# Patient Record
Sex: FEMALE | Race: WHITE | NOT HISPANIC OR LATINO | Employment: UNEMPLOYED | URBAN - NONMETROPOLITAN AREA
[De-identification: names, ages, dates, MRNs, and addresses within clinical notes are randomized per-mention and may not be internally consistent; named-entity substitution may affect disease eponyms.]

---

## 2018-04-28 ENCOUNTER — HOSPITAL ENCOUNTER (EMERGENCY)
Facility: HOSPITAL | Age: 58
Discharge: HOME | End: 2018-04-28
Attending: EMERGENCY MEDICINE | Admitting: EMERGENCY MEDICINE

## 2018-04-28 VITALS
OXYGEN SATURATION: 96 % | HEIGHT: 65 IN | TEMPERATURE: 98.6 F | DIASTOLIC BLOOD PRESSURE: 72 MMHG | RESPIRATION RATE: 20 BRPM | WEIGHT: 160 LBS | SYSTOLIC BLOOD PRESSURE: 106 MMHG | BODY MASS INDEX: 26.66 KG/M2 | HEART RATE: 70 BPM

## 2018-04-28 DIAGNOSIS — J95.03 TRACHEOSTOMY MALFUNCTION (HCC): Primary | ICD-10-CM

## 2018-04-28 PROCEDURE — 99284 EMERGENCY DEPT VISIT MOD MDM: CPT

## 2018-04-28 RX ORDER — ERGOCALCIFEROL 1.25 MG/1
50000 CAPSULE ORAL WEEKLY
COMMUNITY

## 2018-04-28 RX ORDER — AMLODIPINE BESYLATE 10 MG/1
10 TABLET ORAL DAILY
COMMUNITY

## 2018-04-28 RX ORDER — FUROSEMIDE 20 MG/1
20 TABLET ORAL 2 TIMES DAILY
COMMUNITY

## 2018-04-28 RX ORDER — POTASSIUM CHLORIDE 600 MG/1
8 CAPSULE, EXTENDED RELEASE ORAL 2 TIMES DAILY
COMMUNITY

## 2018-04-28 RX ORDER — SENNOSIDES 8.6 MG
TABLET ORAL NIGHTLY
COMMUNITY

## 2018-04-28 RX ORDER — METOCLOPRAMIDE HYDROCHLORIDE 5 MG/5ML
SOLUTION ORAL
COMMUNITY

## 2018-04-28 RX ORDER — ASPIRIN 325 MG
325 TABLET ORAL DAILY
COMMUNITY

## 2018-04-28 RX ORDER — LEVETIRACETAM 100 MG/ML
10 SOLUTION ORAL 2 TIMES DAILY
COMMUNITY

## 2018-04-28 RX ORDER — LACTOBACILLUS ACIDOPHILUS / LACTOBACILLUS BULGARICUS 100 MILLION CFU STRENGTH
GRANULES ORAL 3 TIMES DAILY
COMMUNITY

## 2018-04-28 RX ORDER — METOPROLOL TARTRATE 50 MG/1
50 TABLET, FILM COATED ORAL 2 TIMES DAILY
COMMUNITY

## 2018-04-28 RX ORDER — BUDESONIDE 0.5 MG/2ML
0.5 INHALANT ORAL
COMMUNITY

## 2018-04-28 RX ORDER — GLYCOPYRROLATE 1 MG/1
1 TABLET ORAL 3 TIMES DAILY
COMMUNITY

## 2019-06-11 DIAGNOSIS — M25.572 LEFT ANKLE PAIN, UNSPECIFIED CHRONICITY: Primary | ICD-10-CM

## 2019-07-03 ENCOUNTER — HOSPITAL ENCOUNTER (OUTPATIENT)
Dept: GENERAL RADIOLOGY | Facility: HOSPITAL | Age: 59
Discharge: HOME | End: 2019-07-03
Admitting: ORTHOPAEDIC SURGERY

## 2019-07-03 ENCOUNTER — OFFICE VISIT (OUTPATIENT)
Dept: ORTHOPEDIC SURGERY | Facility: CLINIC | Age: 59
End: 2019-07-03

## 2019-07-03 VITALS
WEIGHT: 175 LBS | BODY MASS INDEX: 29.12 KG/M2 | HEART RATE: 57 BPM | SYSTOLIC BLOOD PRESSURE: 121 MMHG | DIASTOLIC BLOOD PRESSURE: 65 MMHG

## 2019-07-03 DIAGNOSIS — S82.892A CLOSED FRACTURE DISLOCATION OF LEFT ANKLE, INITIAL ENCOUNTER: Primary | ICD-10-CM

## 2019-07-03 DIAGNOSIS — M25.572 LEFT ANKLE PAIN, UNSPECIFIED CHRONICITY: ICD-10-CM

## 2019-07-03 PROCEDURE — 73600 X-RAY EXAM OF ANKLE: CPT

## 2019-07-03 PROCEDURE — 73600 X-RAY EXAM OF ANKLE: CPT | Performed by: RADIOLOGY

## 2019-07-03 PROCEDURE — 99203 OFFICE O/P NEW LOW 30 MIN: CPT | Performed by: ORTHOPAEDIC SURGERY

## 2019-07-03 RX ORDER — HYDROCODONE BITARTRATE AND ACETAMINOPHEN 5; 325 MG/1; MG/1
1 TABLET ORAL EVERY 6 HOURS PRN
COMMUNITY

## 2019-07-03 NOTE — PROGRESS NOTES
Follow-up Visit         Patient: Yulisa Valdez  YOB: 1960  Date of Encounter: 07/03/2019      Chief  Complaint:   Chief Complaint   Patient presents with   • Left Ankle - Pain           HPI:  Yulisa Valdez, 58 y.o. female referred by Dr. Moya.  She is a resident of [facility].  She is nonverbal and has no representative with her today.  She did present with an admission record from the [facility].  Apparently her situation began with cardiopulmonary arrest.  She had anoxic brain injury and now is in a vegetative state.  I reviewed her medical records and she was seen in the emergency room April 2018 regarding her tracheostomy.  Her records indicate that she has schizoaffective disorder and has been a phasic since May 1 of 2014.  We do not have information regarding her injury however she presents today with a x-ray report describes fracture of the right distal fibula.  she has been scheduled on 3 occasions but has had transportation issues.  Her first scheduled appointment here was June 7 of 2019.  She did not present.  Today she presents with a posterior splint to the left ankle.    Medical History:  There is no problem list on file for this patient.    Past Medical History:   Diagnosis Date   • COPD (chronic obstructive pulmonary disease) (CMS/[facility])    • Diabetes (CMS/[facility])    • Epilepsy (CMS/[facility])    • GERD (gastroesophageal reflux disease)    • Osteoporosis    • Schizoaffective disorder (CMS/[facility])    • Seizures (CMS/[facility])        Social History:  Social History     Socioeconomic History   • Marital status: Single     Spouse name: Not on file   • Number of children: Not on file   • Years of education: Not on file   • Highest education level: Not on file   Tobacco Use   • Smoking status: Former Smoker   • Smokeless tobacco: Never Used   Substance and Sexual Activity   • Alcohol use: No   • Drug use: No   • Sexual activity: Defer     Review of Systems:  Review of  Systems   Unable to perform ROS: Patient nonverbal (Unable to obtain review of system)         Surgical History:  History reviewed. No pertinent surgical history.    Radiology: Radiographs brought with her today AP lateral left ankle by report described distal fibula fracture displaced.  My review confirms a distal fibula fracture she also has a dislocation of the tibiotalar joint.      Examination: Left lower extremity evaluation she keeps her left knee in flexed position with contracture.  She has mild swelling of the ankle and foot.  She has generalized tenderness to palpation.  Neurovascular examination is grossly intact.      Assessment & Plan:   58 y.o. female with fracture dislocation of her left ankle sustained at least 4 weeks ago based on her previous appointment schedule.  Her left ankle remains dislocated.  She is placed in a posterior splint today and we will continue this for 4 weeks reevaluate her in 4 weeks time.  I do not think that surgical intervention at this point can be justified given her nonambulatory status and at least 4 weeks duration since she dislocated her ankle.         Diagnosis Plan   1. Closed fracture dislocation of left ankle, initial encounter               Cc:  Ester Moya MD            This document has been electronically signed by Tony Cordon MD   July 10, 2019 6:38 PM

## 2019-08-21 ENCOUNTER — APPOINTMENT (OUTPATIENT)
Dept: GENERAL RADIOLOGY | Facility: HOSPITAL | Age: 59
End: 2019-08-21

## 2021-01-01 ENCOUNTER — OUTSIDE FACILITY SERVICE (OUTPATIENT)
Dept: PULMONOLOGY | Facility: CLINIC | Age: 61
End: 2021-01-01

## 2021-01-01 ENCOUNTER — APPOINTMENT (OUTPATIENT)
Dept: CARDIOLOGY | Facility: HOSPITAL | Age: 61
End: 2021-01-01

## 2021-01-01 ENCOUNTER — APPOINTMENT (OUTPATIENT)
Dept: CT IMAGING | Facility: HOSPITAL | Age: 61
End: 2021-01-01

## 2021-01-01 ENCOUNTER — APPOINTMENT (OUTPATIENT)
Dept: GENERAL RADIOLOGY | Facility: HOSPITAL | Age: 61
End: 2021-01-01

## 2021-01-01 ENCOUNTER — HOSPITAL ENCOUNTER (OUTPATIENT)
Facility: HOSPITAL | Age: 61
Discharge: NURSING, REHAB OR LONG-TERM CARE | End: 2021-12-13
Attending: INTERNAL MEDICINE | Admitting: INTERNAL MEDICINE

## 2021-01-01 ENCOUNTER — HOSPITAL ENCOUNTER (INPATIENT)
Facility: HOSPITAL | Age: 61
LOS: 17 days | End: 2022-01-08
Attending: INTERNAL MEDICINE | Admitting: INTERNAL MEDICINE

## 2021-01-01 ENCOUNTER — HOSPITAL ENCOUNTER (INPATIENT)
Facility: HOSPITAL | Age: 61
LOS: 5 days | End: 2021-12-22
Attending: EMERGENCY MEDICINE | Admitting: HOSPITALIST

## 2021-01-01 VITALS
HEIGHT: 65 IN | RESPIRATION RATE: 16 BRPM | TEMPERATURE: 98.4 F | WEIGHT: 163 LBS | HEART RATE: 55 BPM | SYSTOLIC BLOOD PRESSURE: 140 MMHG | BODY MASS INDEX: 27.16 KG/M2 | OXYGEN SATURATION: 95 % | DIASTOLIC BLOOD PRESSURE: 79 MMHG

## 2021-01-01 VITALS
HEART RATE: 72 BPM | TEMPERATURE: 98.9 F | RESPIRATION RATE: 20 BRPM | DIASTOLIC BLOOD PRESSURE: 80 MMHG | SYSTOLIC BLOOD PRESSURE: 146 MMHG | OXYGEN SATURATION: 94 %

## 2021-01-01 DIAGNOSIS — A41.9 SEPSIS, DUE TO UNSPECIFIED ORGANISM, UNSPECIFIED WHETHER ACUTE ORGAN DYSFUNCTION PRESENT (HCC): Primary | ICD-10-CM

## 2021-01-01 LAB
A-A DO2: 245.8 MMHG (ref 0–300)
ALBUMIN SERPL-MCNC: 2.4 G/DL (ref 3.5–5.2)
ALBUMIN SERPL-MCNC: 2.57 G/DL (ref 3.5–5.2)
ALBUMIN SERPL-MCNC: 2.57 G/DL (ref 3.5–5.2)
ALBUMIN SERPL-MCNC: 2.59 G/DL (ref 3.5–5.2)
ALBUMIN SERPL-MCNC: 2.6 G/DL (ref 3.5–5.2)
ALBUMIN SERPL-MCNC: 2.6 G/DL (ref 3.5–5.2)
ALBUMIN SERPL-MCNC: 2.61 G/DL (ref 3.5–5.2)
ALBUMIN SERPL-MCNC: 2.63 G/DL (ref 3.5–5.2)
ALBUMIN SERPL-MCNC: 2.64 G/DL (ref 3.5–5.2)
ALBUMIN SERPL-MCNC: 2.65 G/DL (ref 3.5–5.2)
ALBUMIN SERPL-MCNC: 2.69 G/DL (ref 3.5–5.2)
ALBUMIN SERPL-MCNC: 2.69 G/DL (ref 3.5–5.2)
ALBUMIN SERPL-MCNC: 2.73 G/DL (ref 3.5–5.2)
ALBUMIN SERPL-MCNC: 2.74 G/DL (ref 3.5–5.2)
ALBUMIN SERPL-MCNC: 2.75 G/DL (ref 3.5–5.2)
ALBUMIN SERPL-MCNC: 2.75 G/DL (ref 3.5–5.2)
ALBUMIN SERPL-MCNC: 2.76 G/DL (ref 3.5–5.2)
ALBUMIN SERPL-MCNC: 2.78 G/DL (ref 3.5–5.2)
ALBUMIN SERPL-MCNC: 2.8 G/DL (ref 3.5–5.2)
ALBUMIN SERPL-MCNC: 2.81 G/DL (ref 3.5–5.2)
ALBUMIN SERPL-MCNC: 2.84 G/DL (ref 3.5–5.2)
ALBUMIN SERPL-MCNC: 2.84 G/DL (ref 3.5–5.2)
ALBUMIN SERPL-MCNC: 2.85 G/DL (ref 3.5–5.2)
ALBUMIN SERPL-MCNC: 2.85 G/DL (ref 3.5–5.2)
ALBUMIN SERPL-MCNC: 2.87 G/DL (ref 3.5–5.2)
ALBUMIN SERPL-MCNC: 2.87 G/DL (ref 3.5–5.2)
ALBUMIN SERPL-MCNC: 2.88 G/DL (ref 3.5–5.2)
ALBUMIN SERPL-MCNC: 2.88 G/DL (ref 3.5–5.2)
ALBUMIN SERPL-MCNC: 2.89 G/DL (ref 3.5–5.2)
ALBUMIN SERPL-MCNC: 2.91 G/DL (ref 3.5–5.2)
ALBUMIN SERPL-MCNC: 2.93 G/DL (ref 3.5–5.2)
ALBUMIN SERPL-MCNC: 2.96 G/DL (ref 3.5–5.2)
ALBUMIN SERPL-MCNC: 3.05 G/DL (ref 3.5–5.2)
ALBUMIN SERPL-MCNC: 3.07 G/DL (ref 3.5–5.2)
ALBUMIN SERPL-MCNC: 3.08 G/DL (ref 3.5–5.2)
ALBUMIN SERPL-MCNC: 3.1 G/DL (ref 3.5–5.2)
ALBUMIN SERPL-MCNC: 3.15 G/DL (ref 3.5–5.2)
ALBUMIN SERPL-MCNC: 3.2 G/DL (ref 3.5–5.2)
ALBUMIN SERPL-MCNC: 3.22 G/DL (ref 3.5–5.2)
ALBUMIN SERPL-MCNC: 3.22 G/DL (ref 3.5–5.2)
ALBUMIN/GLOB SERPL: 0.6 G/DL
ALBUMIN/GLOB SERPL: 0.7 G/DL
ALBUMIN/GLOB SERPL: 0.8 G/DL
ALBUMIN/GLOB SERPL: 0.9 G/DL
ALBUMIN/GLOB SERPL: 0.9 G/DL
ALP SERPL-CCNC: 121 U/L (ref 39–117)
ALP SERPL-CCNC: 121 U/L (ref 39–117)
ALP SERPL-CCNC: 122 U/L (ref 39–117)
ALP SERPL-CCNC: 122 U/L (ref 39–117)
ALP SERPL-CCNC: 125 U/L (ref 39–117)
ALP SERPL-CCNC: 126 U/L (ref 39–117)
ALP SERPL-CCNC: 127 U/L (ref 39–117)
ALP SERPL-CCNC: 127 U/L (ref 39–117)
ALP SERPL-CCNC: 129 U/L (ref 39–117)
ALP SERPL-CCNC: 131 U/L (ref 39–117)
ALP SERPL-CCNC: 134 U/L (ref 39–117)
ALP SERPL-CCNC: 137 U/L (ref 39–117)
ALP SERPL-CCNC: 138 U/L (ref 39–117)
ALP SERPL-CCNC: 138 U/L (ref 39–117)
ALP SERPL-CCNC: 140 U/L (ref 39–117)
ALP SERPL-CCNC: 141 U/L (ref 39–117)
ALP SERPL-CCNC: 141 U/L (ref 39–117)
ALP SERPL-CCNC: 142 U/L (ref 39–117)
ALP SERPL-CCNC: 142 U/L (ref 39–117)
ALP SERPL-CCNC: 143 U/L (ref 39–117)
ALP SERPL-CCNC: 144 U/L (ref 39–117)
ALP SERPL-CCNC: 145 U/L (ref 39–117)
ALP SERPL-CCNC: 146 U/L (ref 39–117)
ALP SERPL-CCNC: 146 U/L (ref 39–117)
ALP SERPL-CCNC: 148 U/L (ref 39–117)
ALP SERPL-CCNC: 149 U/L (ref 39–117)
ALP SERPL-CCNC: 153 U/L (ref 39–117)
ALP SERPL-CCNC: 154 U/L (ref 39–117)
ALP SERPL-CCNC: 155 U/L (ref 39–117)
ALP SERPL-CCNC: 160 U/L (ref 39–117)
ALP SERPL-CCNC: 161 U/L (ref 39–117)
ALP SERPL-CCNC: 162 U/L (ref 39–117)
ALP SERPL-CCNC: 164 U/L (ref 39–117)
ALP SERPL-CCNC: 170 U/L (ref 39–117)
ALP SERPL-CCNC: 173 U/L (ref 39–117)
ALP SERPL-CCNC: 176 U/L (ref 39–117)
ALT SERPL W P-5'-P-CCNC: 10 U/L (ref 1–33)
ALT SERPL W P-5'-P-CCNC: 11 U/L (ref 1–33)
ALT SERPL W P-5'-P-CCNC: 12 U/L (ref 1–33)
ALT SERPL W P-5'-P-CCNC: 12 U/L (ref 1–33)
ALT SERPL W P-5'-P-CCNC: 13 U/L (ref 1–33)
ALT SERPL W P-5'-P-CCNC: 14 U/L (ref 1–33)
ALT SERPL W P-5'-P-CCNC: 15 U/L (ref 1–33)
ALT SERPL W P-5'-P-CCNC: 16 U/L (ref 1–33)
ALT SERPL W P-5'-P-CCNC: 17 U/L (ref 1–33)
ALT SERPL W P-5'-P-CCNC: 17 U/L (ref 1–33)
ALT SERPL W P-5'-P-CCNC: 18 U/L (ref 1–33)
ALT SERPL W P-5'-P-CCNC: 19 U/L (ref 1–33)
ALT SERPL W P-5'-P-CCNC: 19 U/L (ref 1–33)
ALT SERPL W P-5'-P-CCNC: 20 U/L (ref 1–33)
ALT SERPL W P-5'-P-CCNC: 21 U/L (ref 1–33)
ALT SERPL W P-5'-P-CCNC: 21 U/L (ref 1–33)
ALT SERPL W P-5'-P-CCNC: 22 U/L (ref 1–33)
ALT SERPL W P-5'-P-CCNC: 24 U/L (ref 1–33)
ALT SERPL W P-5'-P-CCNC: 26 U/L (ref 1–33)
ALT SERPL W P-5'-P-CCNC: 29 U/L (ref 1–33)
ALT SERPL W P-5'-P-CCNC: 34 U/L (ref 1–33)
ALT SERPL W P-5'-P-CCNC: 52 U/L (ref 1–33)
ALT SERPL W P-5'-P-CCNC: 9 U/L (ref 1–33)
ALT SERPL W P-5'-P-CCNC: 9 U/L (ref 1–33)
ANION GAP SERPL CALCULATED.3IONS-SCNC: 10.1 MMOL/L (ref 5–15)
ANION GAP SERPL CALCULATED.3IONS-SCNC: 10.2 MMOL/L (ref 5–15)
ANION GAP SERPL CALCULATED.3IONS-SCNC: 10.4 MMOL/L (ref 5–15)
ANION GAP SERPL CALCULATED.3IONS-SCNC: 10.6 MMOL/L (ref 5–15)
ANION GAP SERPL CALCULATED.3IONS-SCNC: 11.2 MMOL/L (ref 5–15)
ANION GAP SERPL CALCULATED.3IONS-SCNC: 11.9 MMOL/L (ref 5–15)
ANION GAP SERPL CALCULATED.3IONS-SCNC: 12 MMOL/L (ref 5–15)
ANION GAP SERPL CALCULATED.3IONS-SCNC: 4 MMOL/L (ref 5–15)
ANION GAP SERPL CALCULATED.3IONS-SCNC: 4.3 MMOL/L (ref 5–15)
ANION GAP SERPL CALCULATED.3IONS-SCNC: 4.9 MMOL/L (ref 5–15)
ANION GAP SERPL CALCULATED.3IONS-SCNC: 5 MMOL/L (ref 5–15)
ANION GAP SERPL CALCULATED.3IONS-SCNC: 5.9 MMOL/L (ref 5–15)
ANION GAP SERPL CALCULATED.3IONS-SCNC: 6 MMOL/L (ref 5–15)
ANION GAP SERPL CALCULATED.3IONS-SCNC: 6.8 MMOL/L (ref 5–15)
ANION GAP SERPL CALCULATED.3IONS-SCNC: 6.8 MMOL/L (ref 5–15)
ANION GAP SERPL CALCULATED.3IONS-SCNC: 7.3 MMOL/L (ref 5–15)
ANION GAP SERPL CALCULATED.3IONS-SCNC: 7.8 MMOL/L (ref 5–15)
ANION GAP SERPL CALCULATED.3IONS-SCNC: 7.8 MMOL/L (ref 5–15)
ANION GAP SERPL CALCULATED.3IONS-SCNC: 8 MMOL/L (ref 5–15)
ANION GAP SERPL CALCULATED.3IONS-SCNC: 8.1 MMOL/L (ref 5–15)
ANION GAP SERPL CALCULATED.3IONS-SCNC: 8.2 MMOL/L (ref 5–15)
ANION GAP SERPL CALCULATED.3IONS-SCNC: 8.3 MMOL/L (ref 5–15)
ANION GAP SERPL CALCULATED.3IONS-SCNC: 8.3 MMOL/L (ref 5–15)
ANION GAP SERPL CALCULATED.3IONS-SCNC: 8.4 MMOL/L (ref 5–15)
ANION GAP SERPL CALCULATED.3IONS-SCNC: 8.4 MMOL/L (ref 5–15)
ANION GAP SERPL CALCULATED.3IONS-SCNC: 8.6 MMOL/L (ref 5–15)
ANION GAP SERPL CALCULATED.3IONS-SCNC: 8.7 MMOL/L (ref 5–15)
ANION GAP SERPL CALCULATED.3IONS-SCNC: 8.9 MMOL/L (ref 5–15)
ANION GAP SERPL CALCULATED.3IONS-SCNC: 9 MMOL/L (ref 5–15)
ANION GAP SERPL CALCULATED.3IONS-SCNC: 9 MMOL/L (ref 5–15)
ANION GAP SERPL CALCULATED.3IONS-SCNC: 9.1 MMOL/L (ref 5–15)
ANION GAP SERPL CALCULATED.3IONS-SCNC: 9.3 MMOL/L (ref 5–15)
ANION GAP SERPL CALCULATED.3IONS-SCNC: 9.4 MMOL/L (ref 5–15)
ANION GAP SERPL CALCULATED.3IONS-SCNC: 9.6 MMOL/L (ref 5–15)
ANION GAP SERPL CALCULATED.3IONS-SCNC: 9.8 MMOL/L (ref 5–15)
ANION GAP SERPL CALCULATED.3IONS-SCNC: 9.8 MMOL/L (ref 5–15)
ANION GAP SERPL CALCULATED.3IONS-SCNC: 9.9 MMOL/L (ref 5–15)
ANION GAP SERPL CALCULATED.3IONS-SCNC: 9.9 MMOL/L (ref 5–15)
ARTERIAL PATENCY WRIST A: ABNORMAL
AST SERPL-CCNC: 11 U/L (ref 1–32)
AST SERPL-CCNC: 13 U/L (ref 1–32)
AST SERPL-CCNC: 14 U/L (ref 1–32)
AST SERPL-CCNC: 14 U/L (ref 1–32)
AST SERPL-CCNC: 15 U/L (ref 1–32)
AST SERPL-CCNC: 15 U/L (ref 1–32)
AST SERPL-CCNC: 16 U/L (ref 1–32)
AST SERPL-CCNC: 17 U/L (ref 1–32)
AST SERPL-CCNC: 19 U/L (ref 1–32)
AST SERPL-CCNC: 20 U/L (ref 1–32)
AST SERPL-CCNC: 20 U/L (ref 1–32)
AST SERPL-CCNC: 21 U/L (ref 1–32)
AST SERPL-CCNC: 21 U/L (ref 1–32)
AST SERPL-CCNC: 22 U/L (ref 1–32)
AST SERPL-CCNC: 22 U/L (ref 1–32)
AST SERPL-CCNC: 23 U/L (ref 1–32)
AST SERPL-CCNC: 24 U/L (ref 1–32)
AST SERPL-CCNC: 25 U/L (ref 1–32)
AST SERPL-CCNC: 26 U/L (ref 1–32)
AST SERPL-CCNC: 26 U/L (ref 1–32)
AST SERPL-CCNC: 27 U/L (ref 1–32)
AST SERPL-CCNC: 30 U/L (ref 1–32)
AST SERPL-CCNC: 34 U/L (ref 1–32)
AST SERPL-CCNC: 34 U/L (ref 1–32)
AST SERPL-CCNC: 37 U/L (ref 1–32)
AST SERPL-CCNC: 39 U/L (ref 1–32)
AST SERPL-CCNC: 41 U/L (ref 1–32)
AST SERPL-CCNC: 41 U/L (ref 1–32)
ATMOSPHERIC PRESS: 726 MMHG
B PARAPERT DNA SPEC QL NAA+PROBE: NOT DETECTED
B PERT DNA SPEC QL NAA+PROBE: NOT DETECTED
BACTERIA SPEC AEROBE CULT: ABNORMAL
BACTERIA SPEC AEROBE CULT: NORMAL
BACTERIA SPEC RESP CULT: ABNORMAL
BACTERIA SPEC RESP CULT: ABNORMAL
BACTERIA SPEC RESP CULT: NORMAL
BACTERIA UR QL AUTO: ABNORMAL /HPF
BASE EXCESS BLDA CALC-SCNC: 16.9 MMOL/L (ref 0–2)
BASOPHILS # BLD AUTO: 0 10*3/MM3 (ref 0–0.2)
BASOPHILS # BLD AUTO: 0.02 10*3/MM3 (ref 0–0.2)
BASOPHILS # BLD AUTO: 0.02 10*3/MM3 (ref 0–0.2)
BASOPHILS # BLD AUTO: 0.03 10*3/MM3 (ref 0–0.2)
BASOPHILS # BLD AUTO: 0.04 10*3/MM3 (ref 0–0.2)
BASOPHILS # BLD AUTO: 0.05 10*3/MM3 (ref 0–0.2)
BASOPHILS # BLD AUTO: 0.06 10*3/MM3 (ref 0–0.2)
BASOPHILS # BLD AUTO: 0.07 10*3/MM3 (ref 0–0.2)
BASOPHILS # BLD AUTO: 0.07 10*3/MM3 (ref 0–0.2)
BASOPHILS NFR BLD AUTO: 0 % (ref 0–1.5)
BASOPHILS NFR BLD AUTO: 0.3 % (ref 0–1.5)
BASOPHILS NFR BLD AUTO: 0.4 % (ref 0–1.5)
BASOPHILS NFR BLD AUTO: 0.5 % (ref 0–1.5)
BASOPHILS NFR BLD AUTO: 0.6 % (ref 0–1.5)
BASOPHILS NFR BLD AUTO: 0.7 % (ref 0–1.5)
BASOPHILS NFR BLD AUTO: 0.8 % (ref 0–1.5)
BASOPHILS NFR BLD AUTO: 0.9 % (ref 0–1.5)
BASOPHILS NFR BLD AUTO: 1 % (ref 0–1.5)
BASOPHILS NFR BLD AUTO: 1.2 % (ref 0–1.5)
BASOPHILS NFR BLD AUTO: 1.3 % (ref 0–1.5)
BASOPHILS NFR BLD AUTO: 1.4 % (ref 0–1.5)
BDY SITE: ABNORMAL
BH CV ECHO MEAS - AO MAX PG (FULL): 4 MMHG
BH CV ECHO MEAS - AO MAX PG: 7 MMHG
BH CV ECHO MEAS - AO MEAN PG (FULL): 1.7 MMHG
BH CV ECHO MEAS - AO MEAN PG: 3.3 MMHG
BH CV ECHO MEAS - AO ROOT AREA (BSA CORRECTED): 1.4
BH CV ECHO MEAS - AO ROOT AREA: 5.2 CM^2
BH CV ECHO MEAS - AO ROOT DIAM: 2.6 CM
BH CV ECHO MEAS - AO V2 MAX: 129.9 CM/SEC
BH CV ECHO MEAS - AO V2 MEAN: 79.9 CM/SEC
BH CV ECHO MEAS - AO V2 VTI: 27 CM
BH CV ECHO MEAS - ASC AORTA: 2.9 CM
BH CV ECHO MEAS - AVA(I,A): 1.8 CM^2
BH CV ECHO MEAS - AVA(I,D): 1.8 CM^2
BH CV ECHO MEAS - AVA(V,A): 2 CM^2
BH CV ECHO MEAS - AVA(V,D): 2 CM^2
BH CV ECHO MEAS - BSA(HAYCOCK): 1.9 M^2
BH CV ECHO MEAS - BSA: 1.8 M^2
BH CV ECHO MEAS - BZI_BMI: 27.1 KILOGRAMS/M^2
BH CV ECHO MEAS - BZI_METRIC_HEIGHT: 165.1 CM
BH CV ECHO MEAS - BZI_METRIC_WEIGHT: 73.9 KG
BH CV ECHO MEAS - EDV(CUBED): 46.3 ML
BH CV ECHO MEAS - EDV(MOD-SP2): 65.3 ML
BH CV ECHO MEAS - EDV(MOD-SP4): 63.2 ML
BH CV ECHO MEAS - EDV(TEICH): 54.1 ML
BH CV ECHO MEAS - EF(CUBED): 78.2 %
BH CV ECHO MEAS - EF(MOD-BP): 57.9 %
BH CV ECHO MEAS - EF(MOD-SP2): 57.1 %
BH CV ECHO MEAS - EF(MOD-SP4): 58.5 %
BH CV ECHO MEAS - EF(TEICH): 71.3 %
BH CV ECHO MEAS - ESV(CUBED): 10.1 ML
BH CV ECHO MEAS - ESV(MOD-SP2): 28 ML
BH CV ECHO MEAS - ESV(MOD-SP4): 26.2 ML
BH CV ECHO MEAS - ESV(TEICH): 15.5 ML
BH CV ECHO MEAS - FS: 39.8 %
BH CV ECHO MEAS - IVS/LVPW: 1
BH CV ECHO MEAS - IVSD: 1.3 CM
BH CV ECHO MEAS - LA DIMENSION: 3 CM
BH CV ECHO MEAS - LA/AO: 1.2
BH CV ECHO MEAS - LAD MAJOR: 3.8 CM
BH CV ECHO MEAS - LAT PEAK E' VEL: 11.5 CM/SEC
BH CV ECHO MEAS - LATERAL E/E' RATIO: 6.8
BH CV ECHO MEAS - LV DIASTOLIC VOL/BSA (35-75): 34.9 ML/M^2
BH CV ECHO MEAS - LV IVRT: 0.15 SEC
BH CV ECHO MEAS - LV MASS(C)D: 155.4 GRAMS
BH CV ECHO MEAS - LV MASS(C)DI: 85.7 GRAMS/M^2
BH CV ECHO MEAS - LV MAX PG: 3 MMHG
BH CV ECHO MEAS - LV MEAN PG: 1.6 MMHG
BH CV ECHO MEAS - LV SYSTOLIC VOL/BSA (12-30): 14.4 ML/M^2
BH CV ECHO MEAS - LV V1 MAX: 86.5 CM/SEC
BH CV ECHO MEAS - LV V1 MEAN: 56.8 CM/SEC
BH CV ECHO MEAS - LV V1 VTI: 15.8 CM
BH CV ECHO MEAS - LVIDD: 3.6 CM
BH CV ECHO MEAS - LVIDS: 2.2 CM
BH CV ECHO MEAS - LVLD AP2: 6.4 CM
BH CV ECHO MEAS - LVLD AP4: 7 CM
BH CV ECHO MEAS - LVLS AP2: 5.2 CM
BH CV ECHO MEAS - LVLS AP4: 5.9 CM
BH CV ECHO MEAS - LVOT AREA (M): 3.1 CM^2
BH CV ECHO MEAS - LVOT AREA: 3 CM^2
BH CV ECHO MEAS - LVOT DIAM: 2 CM
BH CV ECHO MEAS - LVPWD: 1.3 CM
BH CV ECHO MEAS - MED PEAK E' VEL: 5.8 CM/SEC
BH CV ECHO MEAS - MEDIAL E/E' RATIO: 13.5
BH CV ECHO MEAS - MV A MAX VEL: 66.3 CM/SEC
BH CV ECHO MEAS - MV DEC SLOPE: 734.5 CM/SEC^2
BH CV ECHO MEAS - MV DEC TIME: 0.19 SEC
BH CV ECHO MEAS - MV E MAX VEL: 77.8 CM/SEC
BH CV ECHO MEAS - MV E/A: 1.2
BH CV ECHO MEAS - MV MAX PG: 4.4 MMHG
BH CV ECHO MEAS - MV MEAN PG: 2.5 MMHG
BH CV ECHO MEAS - MV P1/2T MAX VEL: 97.1 CM/SEC
BH CV ECHO MEAS - MV P1/2T: 38.7 MSEC
BH CV ECHO MEAS - MV V2 MAX: 105.5 CM/SEC
BH CV ECHO MEAS - MV V2 MEAN: 73.5 CM/SEC
BH CV ECHO MEAS - MV V2 VTI: 21.7 CM
BH CV ECHO MEAS - MVA P1/2T LCG: 2.3 CM^2
BH CV ECHO MEAS - MVA(P1/2T): 5.7 CM^2
BH CV ECHO MEAS - MVA(VTI): 2.2 CM^2
BH CV ECHO MEAS - PA ACC TIME: 0.08 SEC
BH CV ECHO MEAS - PA PR(ACCEL): 42.6 MMHG
BH CV ECHO MEAS - RAP SYSTOLE: 8 MMHG
BH CV ECHO MEAS - RVSP: 30 MMHG
BH CV ECHO MEAS - SI(AO): 77.2 ML/M^2
BH CV ECHO MEAS - SI(CUBED): 19.9 ML/M^2
BH CV ECHO MEAS - SI(LVOT): 26.3 ML/M^2
BH CV ECHO MEAS - SI(MOD-SP2): 20.6 ML/M^2
BH CV ECHO MEAS - SI(MOD-SP4): 20.4 ML/M^2
BH CV ECHO MEAS - SI(TEICH): 21.3 ML/M^2
BH CV ECHO MEAS - SV(AO): 140 ML
BH CV ECHO MEAS - SV(CUBED): 36.2 ML
BH CV ECHO MEAS - SV(LVOT): 47.6 ML
BH CV ECHO MEAS - SV(MOD-SP2): 37.3 ML
BH CV ECHO MEAS - SV(MOD-SP4): 37 ML
BH CV ECHO MEAS - SV(TEICH): 38.6 ML
BH CV ECHO MEAS - TR MAX PG: 22 MMHG
BH CV ECHO MEAS - TR MAX VEL: 233 CM/SEC
BH CV ECHO MEASUREMENTS AVERAGE E/E' RATIO: 8.99
BH CV VAS BP RIGHT ARM: NORMAL MMHG
BH CV XLRA - TDI S': 13.5 CM/SEC
BILIRUB SERPL-MCNC: 0.2 MG/DL (ref 0–1.2)
BILIRUB SERPL-MCNC: 0.3 MG/DL (ref 0–1.2)
BILIRUB SERPL-MCNC: 0.5 MG/DL (ref 0–1.2)
BILIRUB SERPL-MCNC: <0.2 MG/DL (ref 0–1.2)
BILIRUB UR QL STRIP: NEGATIVE
BODY TEMPERATURE: 0 C
BUN SERPL-MCNC: 11 MG/DL (ref 8–23)
BUN SERPL-MCNC: 12 MG/DL (ref 8–23)
BUN SERPL-MCNC: 13 MG/DL (ref 8–23)
BUN SERPL-MCNC: 14 MG/DL (ref 8–23)
BUN SERPL-MCNC: 15 MG/DL (ref 8–23)
BUN SERPL-MCNC: 16 MG/DL (ref 8–23)
BUN SERPL-MCNC: 17 MG/DL (ref 8–23)
BUN SERPL-MCNC: 18 MG/DL (ref 8–23)
BUN SERPL-MCNC: 19 MG/DL (ref 8–23)
BUN SERPL-MCNC: 19 MG/DL (ref 8–23)
BUN SERPL-MCNC: 22 MG/DL (ref 8–23)
BUN SERPL-MCNC: 22 MG/DL (ref 8–23)
BUN SERPL-MCNC: 32 MG/DL (ref 8–23)
BUN SERPL-MCNC: 8 MG/DL (ref 8–23)
BUN/CREAT SERPL: 17.8 (ref 7–25)
BUN/CREAT SERPL: 19.7 (ref 7–25)
BUN/CREAT SERPL: 20 (ref 7–25)
BUN/CREAT SERPL: 20.5 (ref 7–25)
BUN/CREAT SERPL: 20.5 (ref 7–25)
BUN/CREAT SERPL: 21.4 (ref 7–25)
BUN/CREAT SERPL: 21.8 (ref 7–25)
BUN/CREAT SERPL: 22.8 (ref 7–25)
BUN/CREAT SERPL: 24 (ref 7–25)
BUN/CREAT SERPL: 25 (ref 7–25)
BUN/CREAT SERPL: 25.6 (ref 7–25)
BUN/CREAT SERPL: 26.4 (ref 7–25)
BUN/CREAT SERPL: 26.4 (ref 7–25)
BUN/CREAT SERPL: 26.9 (ref 7–25)
BUN/CREAT SERPL: 27.1 (ref 7–25)
BUN/CREAT SERPL: 27.3 (ref 7–25)
BUN/CREAT SERPL: 27.5 (ref 7–25)
BUN/CREAT SERPL: 27.7 (ref 7–25)
BUN/CREAT SERPL: 30.6 (ref 7–25)
BUN/CREAT SERPL: 30.9 (ref 7–25)
BUN/CREAT SERPL: 31 (ref 7–25)
BUN/CREAT SERPL: 31.3 (ref 7–25)
BUN/CREAT SERPL: 31.5 (ref 7–25)
BUN/CREAT SERPL: 31.7 (ref 7–25)
BUN/CREAT SERPL: 31.8 (ref 7–25)
BUN/CREAT SERPL: 32.7 (ref 7–25)
BUN/CREAT SERPL: 33.3 (ref 7–25)
BUN/CREAT SERPL: 34.1 (ref 7–25)
BUN/CREAT SERPL: 34.1 (ref 7–25)
BUN/CREAT SERPL: 34.2 (ref 7–25)
BUN/CREAT SERPL: 34.9 (ref 7–25)
BUN/CREAT SERPL: 35 (ref 7–25)
BUN/CREAT SERPL: 35.1 (ref 7–25)
BUN/CREAT SERPL: 36.4 (ref 7–25)
BUN/CREAT SERPL: 38.3 (ref 7–25)
BUN/CREAT SERPL: 39 (ref 7–25)
BUN/CREAT SERPL: 42.3 (ref 7–25)
C PNEUM DNA NPH QL NAA+NON-PROBE: NOT DETECTED
CALCIUM SPEC-SCNC: 8.8 MG/DL (ref 8.6–10.5)
CALCIUM SPEC-SCNC: 8.9 MG/DL (ref 8.6–10.5)
CALCIUM SPEC-SCNC: 9 MG/DL (ref 8.6–10.5)
CALCIUM SPEC-SCNC: 9.1 MG/DL (ref 8.6–10.5)
CALCIUM SPEC-SCNC: 9.2 MG/DL (ref 8.6–10.5)
CALCIUM SPEC-SCNC: 9.2 MG/DL (ref 8.6–10.5)
CALCIUM SPEC-SCNC: 9.3 MG/DL (ref 8.6–10.5)
CALCIUM SPEC-SCNC: 9.4 MG/DL (ref 8.6–10.5)
CALCIUM SPEC-SCNC: 9.5 MG/DL (ref 8.6–10.5)
CALCIUM SPEC-SCNC: 9.5 MG/DL (ref 8.6–10.5)
CALCIUM SPEC-SCNC: 9.6 MG/DL (ref 8.6–10.5)
CALCIUM SPEC-SCNC: 9.7 MG/DL (ref 8.6–10.5)
CALCIUM SPEC-SCNC: 9.9 MG/DL (ref 8.6–10.5)
CHLORIDE SERPL-SCNC: 100 MMOL/L (ref 98–107)
CHLORIDE SERPL-SCNC: 100 MMOL/L (ref 98–107)
CHLORIDE SERPL-SCNC: 101 MMOL/L (ref 98–107)
CHLORIDE SERPL-SCNC: 102 MMOL/L (ref 98–107)
CHLORIDE SERPL-SCNC: 102 MMOL/L (ref 98–107)
CHLORIDE SERPL-SCNC: 103 MMOL/L (ref 98–107)
CHLORIDE SERPL-SCNC: 103 MMOL/L (ref 98–107)
CHLORIDE SERPL-SCNC: 104 MMOL/L (ref 98–107)
CHLORIDE SERPL-SCNC: 105 MMOL/L (ref 98–107)
CHLORIDE SERPL-SCNC: 106 MMOL/L (ref 98–107)
CHLORIDE SERPL-SCNC: 107 MMOL/L (ref 98–107)
CHLORIDE SERPL-SCNC: 107 MMOL/L (ref 98–107)
CHLORIDE SERPL-SCNC: 108 MMOL/L (ref 98–107)
CHLORIDE SERPL-SCNC: 109 MMOL/L (ref 98–107)
CHLORIDE SERPL-SCNC: 109 MMOL/L (ref 98–107)
CHLORIDE SERPL-SCNC: 110 MMOL/L (ref 98–107)
CHLORIDE SERPL-SCNC: 110 MMOL/L (ref 98–107)
CHLORIDE SERPL-SCNC: 111 MMOL/L (ref 98–107)
CHLORIDE SERPL-SCNC: 111 MMOL/L (ref 98–107)
CHLORIDE SERPL-SCNC: 112 MMOL/L (ref 98–107)
CHLORIDE SERPL-SCNC: 114 MMOL/L (ref 98–107)
CHLORIDE SERPL-SCNC: 95 MMOL/L (ref 98–107)
CHLORIDE SERPL-SCNC: 95 MMOL/L (ref 98–107)
CHLORIDE SERPL-SCNC: 98 MMOL/L (ref 98–107)
CHOLEST SERPL-MCNC: 122 MG/DL (ref 0–200)
CLARITY UR: CLEAR
CO2 BLDA-SCNC: 46.7 MMOL/L (ref 22–33)
CO2 SERPL-SCNC: 23.1 MMOL/L (ref 22–29)
CO2 SERPL-SCNC: 23.8 MMOL/L (ref 22–29)
CO2 SERPL-SCNC: 24.1 MMOL/L (ref 22–29)
CO2 SERPL-SCNC: 24.8 MMOL/L (ref 22–29)
CO2 SERPL-SCNC: 25 MMOL/L (ref 22–29)
CO2 SERPL-SCNC: 25.1 MMOL/L (ref 22–29)
CO2 SERPL-SCNC: 25.4 MMOL/L (ref 22–29)
CO2 SERPL-SCNC: 25.7 MMOL/L (ref 22–29)
CO2 SERPL-SCNC: 25.7 MMOL/L (ref 22–29)
CO2 SERPL-SCNC: 25.8 MMOL/L (ref 22–29)
CO2 SERPL-SCNC: 25.9 MMOL/L (ref 22–29)
CO2 SERPL-SCNC: 26 MMOL/L (ref 22–29)
CO2 SERPL-SCNC: 26 MMOL/L (ref 22–29)
CO2 SERPL-SCNC: 26.6 MMOL/L (ref 22–29)
CO2 SERPL-SCNC: 26.8 MMOL/L (ref 22–29)
CO2 SERPL-SCNC: 26.9 MMOL/L (ref 22–29)
CO2 SERPL-SCNC: 27 MMOL/L (ref 22–29)
CO2 SERPL-SCNC: 27.1 MMOL/L (ref 22–29)
CO2 SERPL-SCNC: 27.6 MMOL/L (ref 22–29)
CO2 SERPL-SCNC: 27.7 MMOL/L (ref 22–29)
CO2 SERPL-SCNC: 28 MMOL/L (ref 22–29)
CO2 SERPL-SCNC: 28.2 MMOL/L (ref 22–29)
CO2 SERPL-SCNC: 28.2 MMOL/L (ref 22–29)
CO2 SERPL-SCNC: 28.3 MMOL/L (ref 22–29)
CO2 SERPL-SCNC: 28.4 MMOL/L (ref 22–29)
CO2 SERPL-SCNC: 28.4 MMOL/L (ref 22–29)
CO2 SERPL-SCNC: 28.6 MMOL/L (ref 22–29)
CO2 SERPL-SCNC: 28.6 MMOL/L (ref 22–29)
CO2 SERPL-SCNC: 28.8 MMOL/L (ref 22–29)
CO2 SERPL-SCNC: 28.9 MMOL/L (ref 22–29)
CO2 SERPL-SCNC: 29.2 MMOL/L (ref 22–29)
CO2 SERPL-SCNC: 29.7 MMOL/L (ref 22–29)
CO2 SERPL-SCNC: 30.1 MMOL/L (ref 22–29)
CO2 SERPL-SCNC: 30.2 MMOL/L (ref 22–29)
CO2 SERPL-SCNC: 30.2 MMOL/L (ref 22–29)
CO2 SERPL-SCNC: 32 MMOL/L (ref 22–29)
CO2 SERPL-SCNC: 33.2 MMOL/L (ref 22–29)
CO2 SERPL-SCNC: 35.7 MMOL/L (ref 22–29)
CO2 SERPL-SCNC: 37 MMOL/L (ref 22–29)
CO2 SERPL-SCNC: 37 MMOL/L (ref 22–29)
CO2 SERPL-SCNC: 38 MMOL/L (ref 22–29)
CO2 SERPL-SCNC: 38 MMOL/L (ref 22–29)
CO2 SERPL-SCNC: 39.1 MMOL/L (ref 22–29)
CO2 SERPL-SCNC: 40 MMOL/L (ref 22–29)
COHGB MFR BLD: 1.4 % (ref 0–5)
COLOR UR: YELLOW
CREAT SERPL-MCNC: 0.37 MG/DL (ref 0.57–1)
CREAT SERPL-MCNC: 0.38 MG/DL (ref 0.57–1)
CREAT SERPL-MCNC: 0.39 MG/DL (ref 0.57–1)
CREAT SERPL-MCNC: 0.39 MG/DL (ref 0.57–1)
CREAT SERPL-MCNC: 0.4 MG/DL (ref 0.57–1)
CREAT SERPL-MCNC: 0.4 MG/DL (ref 0.57–1)
CREAT SERPL-MCNC: 0.41 MG/DL (ref 0.57–1)
CREAT SERPL-MCNC: 0.42 MG/DL (ref 0.57–1)
CREAT SERPL-MCNC: 0.43 MG/DL (ref 0.57–1)
CREAT SERPL-MCNC: 0.44 MG/DL (ref 0.57–1)
CREAT SERPL-MCNC: 0.45 MG/DL (ref 0.57–1)
CREAT SERPL-MCNC: 0.47 MG/DL (ref 0.57–1)
CREAT SERPL-MCNC: 0.47 MG/DL (ref 0.57–1)
CREAT SERPL-MCNC: 0.48 MG/DL (ref 0.57–1)
CREAT SERPL-MCNC: 0.49 MG/DL (ref 0.57–1)
CREAT SERPL-MCNC: 0.49 MG/DL (ref 0.57–1)
CREAT SERPL-MCNC: 0.5 MG/DL (ref 0.57–1)
CREAT SERPL-MCNC: 0.51 MG/DL (ref 0.57–1)
CREAT SERPL-MCNC: 0.52 MG/DL (ref 0.57–1)
CREAT SERPL-MCNC: 0.53 MG/DL (ref 0.57–1)
CREAT SERPL-MCNC: 0.53 MG/DL (ref 0.57–1)
CREAT SERPL-MCNC: 0.54 MG/DL (ref 0.57–1)
CREAT SERPL-MCNC: 0.55 MG/DL (ref 0.57–1)
CREAT SERPL-MCNC: 0.55 MG/DL (ref 0.57–1)
CREAT SERPL-MCNC: 0.56 MG/DL (ref 0.57–1)
CREAT SERPL-MCNC: 0.57 MG/DL (ref 0.57–1)
CREAT SERPL-MCNC: 0.57 MG/DL (ref 0.57–1)
CREAT SERPL-MCNC: 0.6 MG/DL (ref 0.57–1)
CREAT SERPL-MCNC: 0.63 MG/DL (ref 0.57–1)
CREAT SERPL-MCNC: 0.69 MG/DL (ref 0.57–1)
CREAT SERPL-MCNC: 0.71 MG/DL (ref 0.57–1)
CRP SERPL-MCNC: 0.47 MG/DL (ref 0–0.5)
CRP SERPL-MCNC: 0.53 MG/DL (ref 0–0.5)
CRP SERPL-MCNC: 0.61 MG/DL (ref 0–0.5)
CRP SERPL-MCNC: 0.63 MG/DL (ref 0–0.5)
CRP SERPL-MCNC: 0.76 MG/DL (ref 0–0.5)
CRP SERPL-MCNC: 0.83 MG/DL (ref 0–0.5)
CRP SERPL-MCNC: 0.9 MG/DL (ref 0–0.5)
CRP SERPL-MCNC: 0.93 MG/DL (ref 0–0.5)
CRP SERPL-MCNC: 0.94 MG/DL (ref 0–0.5)
CRP SERPL-MCNC: 0.94 MG/DL (ref 0–0.5)
CRP SERPL-MCNC: 1.11 MG/DL (ref 0–0.5)
CRP SERPL-MCNC: 1.28 MG/DL (ref 0–0.5)
CRP SERPL-MCNC: 1.36 MG/DL (ref 0–0.5)
CRP SERPL-MCNC: 1.42 MG/DL (ref 0–0.5)
CRP SERPL-MCNC: 1.48 MG/DL (ref 0–0.5)
CRP SERPL-MCNC: 1.48 MG/DL (ref 0–0.5)
CRP SERPL-MCNC: 1.5 MG/DL (ref 0–0.5)
CRP SERPL-MCNC: 1.51 MG/DL (ref 0–0.5)
CRP SERPL-MCNC: 1.51 MG/DL (ref 0–0.5)
CRP SERPL-MCNC: 1.52 MG/DL (ref 0–0.5)
CRP SERPL-MCNC: 1.54 MG/DL (ref 0–0.5)
CRP SERPL-MCNC: 1.56 MG/DL (ref 0–0.5)
CRP SERPL-MCNC: 1.56 MG/DL (ref 0–0.5)
CRP SERPL-MCNC: 1.58 MG/DL (ref 0–0.5)
CRP SERPL-MCNC: 1.59 MG/DL (ref 0–0.5)
CRP SERPL-MCNC: 1.61 MG/DL (ref 0–0.5)
CRP SERPL-MCNC: 1.9 MG/DL (ref 0–0.5)
CRP SERPL-MCNC: 2.04 MG/DL (ref 0–0.5)
CRP SERPL-MCNC: 2.13 MG/DL (ref 0–0.5)
CRP SERPL-MCNC: 2.26 MG/DL (ref 0–0.5)
CRP SERPL-MCNC: 2.36 MG/DL (ref 0–0.5)
CRP SERPL-MCNC: 2.37 MG/DL (ref 0–0.5)
CRP SERPL-MCNC: 2.55 MG/DL (ref 0–0.5)
CRP SERPL-MCNC: 2.58 MG/DL (ref 0–0.5)
CRP SERPL-MCNC: 2.76 MG/DL (ref 0–0.5)
CRP SERPL-MCNC: 3.02 MG/DL (ref 0–0.5)
CRP SERPL-MCNC: 3.49 MG/DL (ref 0–0.5)
CRP SERPL-MCNC: 4.63 MG/DL (ref 0–0.5)
CRP SERPL-MCNC: 5.79 MG/DL (ref 0–0.5)
D-LACTATE SERPL-SCNC: 1.4 MMOL/L (ref 0.5–2)
D-LACTATE SERPL-SCNC: 1.7 MMOL/L (ref 0.5–2)
DACRYOCYTES BLD QL SMEAR: NORMAL
DEPRECATED RDW RBC AUTO: 46.7 FL (ref 37–54)
DEPRECATED RDW RBC AUTO: 46.7 FL (ref 37–54)
DEPRECATED RDW RBC AUTO: 47.2 FL (ref 37–54)
DEPRECATED RDW RBC AUTO: 47.2 FL (ref 37–54)
DEPRECATED RDW RBC AUTO: 47.3 FL (ref 37–54)
DEPRECATED RDW RBC AUTO: 47.5 FL (ref 37–54)
DEPRECATED RDW RBC AUTO: 47.7 FL (ref 37–54)
DEPRECATED RDW RBC AUTO: 47.8 FL (ref 37–54)
DEPRECATED RDW RBC AUTO: 47.9 FL (ref 37–54)
DEPRECATED RDW RBC AUTO: 48 FL (ref 37–54)
DEPRECATED RDW RBC AUTO: 48.3 FL (ref 37–54)
DEPRECATED RDW RBC AUTO: 48.5 FL (ref 37–54)
DEPRECATED RDW RBC AUTO: 48.8 FL (ref 37–54)
DEPRECATED RDW RBC AUTO: 48.9 FL (ref 37–54)
DEPRECATED RDW RBC AUTO: 48.9 FL (ref 37–54)
DEPRECATED RDW RBC AUTO: 49.3 FL (ref 37–54)
DEPRECATED RDW RBC AUTO: 49.4 FL (ref 37–54)
DEPRECATED RDW RBC AUTO: 49.6 FL (ref 37–54)
DEPRECATED RDW RBC AUTO: 49.7 FL (ref 37–54)
DEPRECATED RDW RBC AUTO: 49.9 FL (ref 37–54)
DEPRECATED RDW RBC AUTO: 50 FL (ref 37–54)
DEPRECATED RDW RBC AUTO: 50.2 FL (ref 37–54)
DEPRECATED RDW RBC AUTO: 50.3 FL (ref 37–54)
DEPRECATED RDW RBC AUTO: 50.4 FL (ref 37–54)
DEPRECATED RDW RBC AUTO: 50.7 FL (ref 37–54)
DEPRECATED RDW RBC AUTO: 50.9 FL (ref 37–54)
DEPRECATED RDW RBC AUTO: 51 FL (ref 37–54)
DEPRECATED RDW RBC AUTO: 51.2 FL (ref 37–54)
DEPRECATED RDW RBC AUTO: 51.3 FL (ref 37–54)
DEPRECATED RDW RBC AUTO: 51.4 FL (ref 37–54)
DEPRECATED RDW RBC AUTO: 51.5 FL (ref 37–54)
DEPRECATED RDW RBC AUTO: 51.7 FL (ref 37–54)
DEPRECATED RDW RBC AUTO: 52.4 FL (ref 37–54)
EOSINOPHIL # BLD AUTO: 0 10*3/MM3 (ref 0–0.4)
EOSINOPHIL # BLD AUTO: 0.04 10*3/MM3 (ref 0–0.4)
EOSINOPHIL # BLD AUTO: 0.29 10*3/MM3 (ref 0–0.4)
EOSINOPHIL # BLD AUTO: 0.29 10*3/MM3 (ref 0–0.4)
EOSINOPHIL # BLD AUTO: 0.31 10*3/MM3 (ref 0–0.4)
EOSINOPHIL # BLD AUTO: 0.32 10*3/MM3 (ref 0–0.4)
EOSINOPHIL # BLD AUTO: 0.33 10*3/MM3 (ref 0–0.4)
EOSINOPHIL # BLD AUTO: 0.34 10*3/MM3 (ref 0–0.4)
EOSINOPHIL # BLD AUTO: 0.36 10*3/MM3 (ref 0–0.4)
EOSINOPHIL # BLD AUTO: 0.37 10*3/MM3 (ref 0–0.4)
EOSINOPHIL # BLD AUTO: 0.38 10*3/MM3 (ref 0–0.4)
EOSINOPHIL # BLD AUTO: 0.39 10*3/MM3 (ref 0–0.4)
EOSINOPHIL # BLD AUTO: 0.39 10*3/MM3 (ref 0–0.4)
EOSINOPHIL # BLD AUTO: 0.41 10*3/MM3 (ref 0–0.4)
EOSINOPHIL # BLD AUTO: 0.42 10*3/MM3 (ref 0–0.4)
EOSINOPHIL # BLD AUTO: 0.42 10*3/MM3 (ref 0–0.4)
EOSINOPHIL # BLD AUTO: 0.43 10*3/MM3 (ref 0–0.4)
EOSINOPHIL # BLD AUTO: 0.44 10*3/MM3 (ref 0–0.4)
EOSINOPHIL # BLD AUTO: 0.45 10*3/MM3 (ref 0–0.4)
EOSINOPHIL # BLD AUTO: 0.45 10*3/MM3 (ref 0–0.4)
EOSINOPHIL # BLD AUTO: 0.46 10*3/MM3 (ref 0–0.4)
EOSINOPHIL # BLD AUTO: 0.47 10*3/MM3 (ref 0–0.4)
EOSINOPHIL # BLD AUTO: 0.5 10*3/MM3 (ref 0–0.4)
EOSINOPHIL # BLD AUTO: 0.51 10*3/MM3 (ref 0–0.4)
EOSINOPHIL # BLD AUTO: 0.51 10*3/MM3 (ref 0–0.4)
EOSINOPHIL # BLD AUTO: 0.52 10*3/MM3 (ref 0–0.4)
EOSINOPHIL # BLD AUTO: 0.52 10*3/MM3 (ref 0–0.4)
EOSINOPHIL # BLD AUTO: 0.53 10*3/MM3 (ref 0–0.4)
EOSINOPHIL # BLD AUTO: 0.53 10*3/MM3 (ref 0–0.4)
EOSINOPHIL # BLD AUTO: 0.54 10*3/MM3 (ref 0–0.4)
EOSINOPHIL # BLD AUTO: 0.55 10*3/MM3 (ref 0–0.4)
EOSINOPHIL # BLD AUTO: 0.6 10*3/MM3 (ref 0–0.4)
EOSINOPHIL # BLD AUTO: 0.6 10*3/MM3 (ref 0–0.4)
EOSINOPHIL # BLD AUTO: 0.61 10*3/MM3 (ref 0–0.4)
EOSINOPHIL # BLD AUTO: 0.67 10*3/MM3 (ref 0–0.4)
EOSINOPHIL # BLD AUTO: 0.68 10*3/MM3 (ref 0–0.4)
EOSINOPHIL # BLD AUTO: 0.74 10*3/MM3 (ref 0–0.4)
EOSINOPHIL NFR BLD AUTO: 0 % (ref 0.3–6.2)
EOSINOPHIL NFR BLD AUTO: 0.2 % (ref 0.3–6.2)
EOSINOPHIL NFR BLD AUTO: 10.6 % (ref 0.3–6.2)
EOSINOPHIL NFR BLD AUTO: 12.3 % (ref 0.3–6.2)
EOSINOPHIL NFR BLD AUTO: 12.4 % (ref 0.3–6.2)
EOSINOPHIL NFR BLD AUTO: 14 % (ref 0.3–6.2)
EOSINOPHIL NFR BLD AUTO: 3 % (ref 0.3–6.2)
EOSINOPHIL NFR BLD AUTO: 3.8 % (ref 0.3–6.2)
EOSINOPHIL NFR BLD AUTO: 3.9 % (ref 0.3–6.2)
EOSINOPHIL NFR BLD AUTO: 4.2 % (ref 0.3–6.2)
EOSINOPHIL NFR BLD AUTO: 4.3 % (ref 0.3–6.2)
EOSINOPHIL NFR BLD AUTO: 5.1 % (ref 0.3–6.2)
EOSINOPHIL NFR BLD AUTO: 5.3 % (ref 0.3–6.2)
EOSINOPHIL NFR BLD AUTO: 5.3 % (ref 0.3–6.2)
EOSINOPHIL NFR BLD AUTO: 5.5 % (ref 0.3–6.2)
EOSINOPHIL NFR BLD AUTO: 5.7 % (ref 0.3–6.2)
EOSINOPHIL NFR BLD AUTO: 5.8 % (ref 0.3–6.2)
EOSINOPHIL NFR BLD AUTO: 5.9 % (ref 0.3–6.2)
EOSINOPHIL NFR BLD AUTO: 5.9 % (ref 0.3–6.2)
EOSINOPHIL NFR BLD AUTO: 6 % (ref 0.3–6.2)
EOSINOPHIL NFR BLD AUTO: 6.1 % (ref 0.3–6.2)
EOSINOPHIL NFR BLD AUTO: 6.1 % (ref 0.3–6.2)
EOSINOPHIL NFR BLD AUTO: 6.5 % (ref 0.3–6.2)
EOSINOPHIL NFR BLD AUTO: 7.3 % (ref 0.3–6.2)
EOSINOPHIL NFR BLD AUTO: 7.5 % (ref 0.3–6.2)
EOSINOPHIL NFR BLD AUTO: 7.7 % (ref 0.3–6.2)
EOSINOPHIL NFR BLD AUTO: 7.7 % (ref 0.3–6.2)
EOSINOPHIL NFR BLD AUTO: 8 % (ref 0.3–6.2)
EOSINOPHIL NFR BLD AUTO: 8.1 % (ref 0.3–6.2)
EOSINOPHIL NFR BLD AUTO: 8.2 % (ref 0.3–6.2)
EOSINOPHIL NFR BLD AUTO: 8.2 % (ref 0.3–6.2)
EOSINOPHIL NFR BLD AUTO: 8.3 % (ref 0.3–6.2)
EOSINOPHIL NFR BLD AUTO: 8.7 % (ref 0.3–6.2)
EOSINOPHIL NFR BLD AUTO: 8.7 % (ref 0.3–6.2)
EOSINOPHIL NFR BLD AUTO: 9 % (ref 0.3–6.2)
EOSINOPHIL NFR BLD AUTO: 9.1 % (ref 0.3–6.2)
EOSINOPHIL NFR BLD AUTO: 9.3 % (ref 0.3–6.2)
EOSINOPHIL NFR BLD AUTO: 9.7 % (ref 0.3–6.2)
EOSINOPHIL NFR BLD AUTO: 9.9 % (ref 0.3–6.2)
ERYTHROCYTE [DISTWIDTH] IN BLOOD BY AUTOMATED COUNT: 13.5 % (ref 12.3–15.4)
ERYTHROCYTE [DISTWIDTH] IN BLOOD BY AUTOMATED COUNT: 13.7 % (ref 12.3–15.4)
ERYTHROCYTE [DISTWIDTH] IN BLOOD BY AUTOMATED COUNT: 13.7 % (ref 12.3–15.4)
ERYTHROCYTE [DISTWIDTH] IN BLOOD BY AUTOMATED COUNT: 13.8 % (ref 12.3–15.4)
ERYTHROCYTE [DISTWIDTH] IN BLOOD BY AUTOMATED COUNT: 13.9 % (ref 12.3–15.4)
ERYTHROCYTE [DISTWIDTH] IN BLOOD BY AUTOMATED COUNT: 14 % (ref 12.3–15.4)
ERYTHROCYTE [DISTWIDTH] IN BLOOD BY AUTOMATED COUNT: 14.1 % (ref 12.3–15.4)
ERYTHROCYTE [DISTWIDTH] IN BLOOD BY AUTOMATED COUNT: 14.2 % (ref 12.3–15.4)
ERYTHROCYTE [DISTWIDTH] IN BLOOD BY AUTOMATED COUNT: 14.3 % (ref 12.3–15.4)
FLUAV SUBTYP SPEC NAA+PROBE: NOT DETECTED
FLUAV SUBTYP SPEC NAA+PROBE: NOT DETECTED
FLUBV RNA ISLT QL NAA+PROBE: NOT DETECTED
FLUBV RNA ISLT QL NAA+PROBE: NOT DETECTED
GAS FLOW AIRWAY: 12 LPM
GENTAMICIN SERPL-MCNC: 0.4 MCG/ML (ref 0.5–10)
GENTAMICIN SERPL-MCNC: 1.3 MCG/ML (ref 0.5–10)
GENTAMICIN SERPL-MCNC: 2.2 MCG/ML (ref 0.5–10)
GFR SERPL CREATININE-BSD FRML MDRD: 107 ML/MIN/1.73
GFR SERPL CREATININE-BSD FRML MDRD: 107 ML/MIN/1.73
GFR SERPL CREATININE-BSD FRML MDRD: 110 ML/MIN/1.73
GFR SERPL CREATININE-BSD FRML MDRD: 112 ML/MIN/1.73
GFR SERPL CREATININE-BSD FRML MDRD: 112 ML/MIN/1.73
GFR SERPL CREATININE-BSD FRML MDRD: 114 ML/MIN/1.73
GFR SERPL CREATININE-BSD FRML MDRD: 117 ML/MIN/1.73
GFR SERPL CREATININE-BSD FRML MDRD: 117 ML/MIN/1.73
GFR SERPL CREATININE-BSD FRML MDRD: 119 ML/MIN/1.73
GFR SERPL CREATININE-BSD FRML MDRD: 122 ML/MIN/1.73
GFR SERPL CREATININE-BSD FRML MDRD: 125 ML/MIN/1.73
GFR SERPL CREATININE-BSD FRML MDRD: 128 ML/MIN/1.73
GFR SERPL CREATININE-BSD FRML MDRD: 128 ML/MIN/1.73
GFR SERPL CREATININE-BSD FRML MDRD: 131 ML/MIN/1.73
GFR SERPL CREATININE-BSD FRML MDRD: 134 ML/MIN/1.73
GFR SERPL CREATININE-BSD FRML MDRD: 134 ML/MIN/1.73
GFR SERPL CREATININE-BSD FRML MDRD: 141 ML/MIN/1.73
GFR SERPL CREATININE-BSD FRML MDRD: 144 ML/MIN/1.73
GFR SERPL CREATININE-BSD FRML MDRD: 145 ML/MIN/1.73
GFR SERPL CREATININE-BSD FRML MDRD: 149 ML/MIN/1.73
GFR SERPL CREATININE-BSD FRML MDRD: 83 ML/MIN/1.73
GFR SERPL CREATININE-BSD FRML MDRD: 86 ML/MIN/1.73
GFR SERPL CREATININE-BSD FRML MDRD: 96 ML/MIN/1.73
GFR SERPL CREATININE-BSD FRML MDRD: >150 ML/MIN/1.73
GLOBULIN UR ELPH-MCNC: 3.4 GM/DL
GLOBULIN UR ELPH-MCNC: 3.5 GM/DL
GLOBULIN UR ELPH-MCNC: 3.6 GM/DL
GLOBULIN UR ELPH-MCNC: 3.6 GM/DL
GLOBULIN UR ELPH-MCNC: 3.7 GM/DL
GLOBULIN UR ELPH-MCNC: 3.8 GM/DL
GLOBULIN UR ELPH-MCNC: 3.8 GM/DL
GLOBULIN UR ELPH-MCNC: 3.9 GM/DL
GLOBULIN UR ELPH-MCNC: 4 GM/DL
GLOBULIN UR ELPH-MCNC: 4.1 GM/DL
GLOBULIN UR ELPH-MCNC: 4.2 GM/DL
GLOBULIN UR ELPH-MCNC: 4.3 GM/DL
GLOBULIN UR ELPH-MCNC: 4.4 GM/DL
GLOBULIN UR ELPH-MCNC: 4.5 GM/DL
GLOBULIN UR ELPH-MCNC: 4.6 GM/DL
GLOBULIN UR ELPH-MCNC: 4.6 GM/DL
GLOBULIN UR ELPH-MCNC: 5 GM/DL
GLUCOSE BLDC GLUCOMTR-MCNC: 100 MG/DL (ref 70–130)
GLUCOSE BLDC GLUCOMTR-MCNC: 100 MG/DL (ref 70–130)
GLUCOSE BLDC GLUCOMTR-MCNC: 101 MG/DL (ref 70–130)
GLUCOSE BLDC GLUCOMTR-MCNC: 101 MG/DL (ref 70–130)
GLUCOSE BLDC GLUCOMTR-MCNC: 104 MG/DL (ref 70–130)
GLUCOSE BLDC GLUCOMTR-MCNC: 106 MG/DL (ref 70–130)
GLUCOSE BLDC GLUCOMTR-MCNC: 107 MG/DL (ref 70–130)
GLUCOSE BLDC GLUCOMTR-MCNC: 108 MG/DL (ref 70–130)
GLUCOSE BLDC GLUCOMTR-MCNC: 108 MG/DL (ref 70–130)
GLUCOSE BLDC GLUCOMTR-MCNC: 109 MG/DL (ref 70–130)
GLUCOSE BLDC GLUCOMTR-MCNC: 110 MG/DL (ref 70–130)
GLUCOSE BLDC GLUCOMTR-MCNC: 111 MG/DL (ref 70–130)
GLUCOSE BLDC GLUCOMTR-MCNC: 112 MG/DL (ref 70–130)
GLUCOSE BLDC GLUCOMTR-MCNC: 113 MG/DL (ref 70–130)
GLUCOSE BLDC GLUCOMTR-MCNC: 114 MG/DL (ref 70–130)
GLUCOSE BLDC GLUCOMTR-MCNC: 115 MG/DL (ref 70–130)
GLUCOSE BLDC GLUCOMTR-MCNC: 116 MG/DL (ref 70–130)
GLUCOSE BLDC GLUCOMTR-MCNC: 117 MG/DL (ref 70–130)
GLUCOSE BLDC GLUCOMTR-MCNC: 117 MG/DL (ref 70–130)
GLUCOSE BLDC GLUCOMTR-MCNC: 118 MG/DL (ref 70–130)
GLUCOSE BLDC GLUCOMTR-MCNC: 119 MG/DL (ref 70–130)
GLUCOSE BLDC GLUCOMTR-MCNC: 120 MG/DL (ref 70–130)
GLUCOSE BLDC GLUCOMTR-MCNC: 121 MG/DL (ref 70–130)
GLUCOSE BLDC GLUCOMTR-MCNC: 123 MG/DL (ref 70–130)
GLUCOSE BLDC GLUCOMTR-MCNC: 124 MG/DL (ref 70–130)
GLUCOSE BLDC GLUCOMTR-MCNC: 124 MG/DL (ref 70–130)
GLUCOSE BLDC GLUCOMTR-MCNC: 125 MG/DL (ref 70–130)
GLUCOSE BLDC GLUCOMTR-MCNC: 126 MG/DL (ref 70–130)
GLUCOSE BLDC GLUCOMTR-MCNC: 127 MG/DL (ref 70–130)
GLUCOSE BLDC GLUCOMTR-MCNC: 128 MG/DL (ref 70–130)
GLUCOSE BLDC GLUCOMTR-MCNC: 129 MG/DL (ref 70–130)
GLUCOSE BLDC GLUCOMTR-MCNC: 130 MG/DL (ref 70–130)
GLUCOSE BLDC GLUCOMTR-MCNC: 130 MG/DL (ref 70–130)
GLUCOSE BLDC GLUCOMTR-MCNC: 131 MG/DL (ref 70–130)
GLUCOSE BLDC GLUCOMTR-MCNC: 132 MG/DL (ref 70–130)
GLUCOSE BLDC GLUCOMTR-MCNC: 132 MG/DL (ref 70–130)
GLUCOSE BLDC GLUCOMTR-MCNC: 133 MG/DL (ref 70–130)
GLUCOSE BLDC GLUCOMTR-MCNC: 134 MG/DL (ref 70–130)
GLUCOSE BLDC GLUCOMTR-MCNC: 135 MG/DL (ref 70–130)
GLUCOSE BLDC GLUCOMTR-MCNC: 136 MG/DL (ref 70–130)
GLUCOSE BLDC GLUCOMTR-MCNC: 137 MG/DL (ref 70–130)
GLUCOSE BLDC GLUCOMTR-MCNC: 138 MG/DL (ref 70–130)
GLUCOSE BLDC GLUCOMTR-MCNC: 138 MG/DL (ref 70–130)
GLUCOSE BLDC GLUCOMTR-MCNC: 139 MG/DL (ref 70–130)
GLUCOSE BLDC GLUCOMTR-MCNC: 140 MG/DL (ref 70–130)
GLUCOSE BLDC GLUCOMTR-MCNC: 140 MG/DL (ref 70–130)
GLUCOSE BLDC GLUCOMTR-MCNC: 141 MG/DL (ref 70–130)
GLUCOSE BLDC GLUCOMTR-MCNC: 142 MG/DL (ref 70–130)
GLUCOSE BLDC GLUCOMTR-MCNC: 143 MG/DL (ref 70–130)
GLUCOSE BLDC GLUCOMTR-MCNC: 145 MG/DL (ref 70–130)
GLUCOSE BLDC GLUCOMTR-MCNC: 146 MG/DL (ref 70–130)
GLUCOSE BLDC GLUCOMTR-MCNC: 147 MG/DL (ref 70–130)
GLUCOSE BLDC GLUCOMTR-MCNC: 148 MG/DL (ref 70–130)
GLUCOSE BLDC GLUCOMTR-MCNC: 149 MG/DL (ref 70–130)
GLUCOSE BLDC GLUCOMTR-MCNC: 150 MG/DL (ref 70–130)
GLUCOSE BLDC GLUCOMTR-MCNC: 151 MG/DL (ref 70–130)
GLUCOSE BLDC GLUCOMTR-MCNC: 152 MG/DL (ref 70–130)
GLUCOSE BLDC GLUCOMTR-MCNC: 153 MG/DL (ref 70–130)
GLUCOSE BLDC GLUCOMTR-MCNC: 153 MG/DL (ref 70–130)
GLUCOSE BLDC GLUCOMTR-MCNC: 154 MG/DL (ref 70–130)
GLUCOSE BLDC GLUCOMTR-MCNC: 155 MG/DL (ref 70–130)
GLUCOSE BLDC GLUCOMTR-MCNC: 156 MG/DL (ref 70–130)
GLUCOSE BLDC GLUCOMTR-MCNC: 157 MG/DL (ref 70–130)
GLUCOSE BLDC GLUCOMTR-MCNC: 157 MG/DL (ref 70–130)
GLUCOSE BLDC GLUCOMTR-MCNC: 158 MG/DL (ref 70–130)
GLUCOSE BLDC GLUCOMTR-MCNC: 159 MG/DL (ref 70–130)
GLUCOSE BLDC GLUCOMTR-MCNC: 161 MG/DL (ref 70–130)
GLUCOSE BLDC GLUCOMTR-MCNC: 162 MG/DL (ref 70–130)
GLUCOSE BLDC GLUCOMTR-MCNC: 163 MG/DL (ref 70–130)
GLUCOSE BLDC GLUCOMTR-MCNC: 164 MG/DL (ref 70–130)
GLUCOSE BLDC GLUCOMTR-MCNC: 165 MG/DL (ref 70–130)
GLUCOSE BLDC GLUCOMTR-MCNC: 165 MG/DL (ref 70–130)
GLUCOSE BLDC GLUCOMTR-MCNC: 166 MG/DL (ref 70–130)
GLUCOSE BLDC GLUCOMTR-MCNC: 167 MG/DL (ref 70–130)
GLUCOSE BLDC GLUCOMTR-MCNC: 168 MG/DL (ref 70–130)
GLUCOSE BLDC GLUCOMTR-MCNC: 168 MG/DL (ref 70–130)
GLUCOSE BLDC GLUCOMTR-MCNC: 169 MG/DL (ref 70–130)
GLUCOSE BLDC GLUCOMTR-MCNC: 170 MG/DL (ref 70–130)
GLUCOSE BLDC GLUCOMTR-MCNC: 170 MG/DL (ref 70–130)
GLUCOSE BLDC GLUCOMTR-MCNC: 171 MG/DL (ref 70–130)
GLUCOSE BLDC GLUCOMTR-MCNC: 171 MG/DL (ref 70–130)
GLUCOSE BLDC GLUCOMTR-MCNC: 172 MG/DL (ref 70–130)
GLUCOSE BLDC GLUCOMTR-MCNC: 173 MG/DL (ref 70–130)
GLUCOSE BLDC GLUCOMTR-MCNC: 174 MG/DL (ref 70–130)
GLUCOSE BLDC GLUCOMTR-MCNC: 175 MG/DL (ref 70–130)
GLUCOSE BLDC GLUCOMTR-MCNC: 176 MG/DL (ref 70–130)
GLUCOSE BLDC GLUCOMTR-MCNC: 176 MG/DL (ref 70–130)
GLUCOSE BLDC GLUCOMTR-MCNC: 177 MG/DL (ref 70–130)
GLUCOSE BLDC GLUCOMTR-MCNC: 177 MG/DL (ref 70–130)
GLUCOSE BLDC GLUCOMTR-MCNC: 178 MG/DL (ref 70–130)
GLUCOSE BLDC GLUCOMTR-MCNC: 179 MG/DL (ref 70–130)
GLUCOSE BLDC GLUCOMTR-MCNC: 179 MG/DL (ref 70–130)
GLUCOSE BLDC GLUCOMTR-MCNC: 180 MG/DL (ref 70–130)
GLUCOSE BLDC GLUCOMTR-MCNC: 180 MG/DL (ref 70–130)
GLUCOSE BLDC GLUCOMTR-MCNC: 181 MG/DL (ref 70–130)
GLUCOSE BLDC GLUCOMTR-MCNC: 181 MG/DL (ref 70–130)
GLUCOSE BLDC GLUCOMTR-MCNC: 182 MG/DL (ref 70–130)
GLUCOSE BLDC GLUCOMTR-MCNC: 183 MG/DL (ref 70–130)
GLUCOSE BLDC GLUCOMTR-MCNC: 184 MG/DL (ref 70–130)
GLUCOSE BLDC GLUCOMTR-MCNC: 185 MG/DL (ref 70–130)
GLUCOSE BLDC GLUCOMTR-MCNC: 185 MG/DL (ref 70–130)
GLUCOSE BLDC GLUCOMTR-MCNC: 186 MG/DL (ref 70–130)
GLUCOSE BLDC GLUCOMTR-MCNC: 187 MG/DL (ref 70–130)
GLUCOSE BLDC GLUCOMTR-MCNC: 188 MG/DL (ref 70–130)
GLUCOSE BLDC GLUCOMTR-MCNC: 188 MG/DL (ref 70–130)
GLUCOSE BLDC GLUCOMTR-MCNC: 190 MG/DL (ref 70–130)
GLUCOSE BLDC GLUCOMTR-MCNC: 191 MG/DL (ref 70–130)
GLUCOSE BLDC GLUCOMTR-MCNC: 192 MG/DL (ref 70–130)
GLUCOSE BLDC GLUCOMTR-MCNC: 193 MG/DL (ref 70–130)
GLUCOSE BLDC GLUCOMTR-MCNC: 194 MG/DL (ref 70–130)
GLUCOSE BLDC GLUCOMTR-MCNC: 194 MG/DL (ref 70–130)
GLUCOSE BLDC GLUCOMTR-MCNC: 196 MG/DL (ref 70–130)
GLUCOSE BLDC GLUCOMTR-MCNC: 196 MG/DL (ref 70–130)
GLUCOSE BLDC GLUCOMTR-MCNC: 197 MG/DL (ref 70–130)
GLUCOSE BLDC GLUCOMTR-MCNC: 198 MG/DL (ref 70–130)
GLUCOSE BLDC GLUCOMTR-MCNC: 198 MG/DL (ref 70–130)
GLUCOSE BLDC GLUCOMTR-MCNC: 200 MG/DL (ref 70–130)
GLUCOSE BLDC GLUCOMTR-MCNC: 202 MG/DL (ref 70–130)
GLUCOSE BLDC GLUCOMTR-MCNC: 204 MG/DL (ref 70–130)
GLUCOSE BLDC GLUCOMTR-MCNC: 204 MG/DL (ref 70–130)
GLUCOSE BLDC GLUCOMTR-MCNC: 206 MG/DL (ref 70–130)
GLUCOSE BLDC GLUCOMTR-MCNC: 206 MG/DL (ref 70–130)
GLUCOSE BLDC GLUCOMTR-MCNC: 209 MG/DL (ref 70–130)
GLUCOSE BLDC GLUCOMTR-MCNC: 209 MG/DL (ref 70–130)
GLUCOSE BLDC GLUCOMTR-MCNC: 210 MG/DL (ref 70–130)
GLUCOSE BLDC GLUCOMTR-MCNC: 210 MG/DL (ref 70–130)
GLUCOSE BLDC GLUCOMTR-MCNC: 211 MG/DL (ref 70–130)
GLUCOSE BLDC GLUCOMTR-MCNC: 212 MG/DL (ref 70–130)
GLUCOSE BLDC GLUCOMTR-MCNC: 213 MG/DL (ref 70–130)
GLUCOSE BLDC GLUCOMTR-MCNC: 215 MG/DL (ref 70–130)
GLUCOSE BLDC GLUCOMTR-MCNC: 217 MG/DL (ref 70–130)
GLUCOSE BLDC GLUCOMTR-MCNC: 219 MG/DL (ref 70–130)
GLUCOSE BLDC GLUCOMTR-MCNC: 220 MG/DL (ref 70–130)
GLUCOSE BLDC GLUCOMTR-MCNC: 222 MG/DL (ref 70–130)
GLUCOSE BLDC GLUCOMTR-MCNC: 225 MG/DL (ref 70–130)
GLUCOSE BLDC GLUCOMTR-MCNC: 226 MG/DL (ref 70–130)
GLUCOSE BLDC GLUCOMTR-MCNC: 231 MG/DL (ref 70–130)
GLUCOSE BLDC GLUCOMTR-MCNC: 235 MG/DL (ref 70–130)
GLUCOSE BLDC GLUCOMTR-MCNC: 238 MG/DL (ref 70–130)
GLUCOSE BLDC GLUCOMTR-MCNC: 243 MG/DL (ref 70–130)
GLUCOSE BLDC GLUCOMTR-MCNC: 244 MG/DL (ref 70–130)
GLUCOSE BLDC GLUCOMTR-MCNC: 249 MG/DL (ref 70–130)
GLUCOSE BLDC GLUCOMTR-MCNC: 252 MG/DL (ref 70–130)
GLUCOSE BLDC GLUCOMTR-MCNC: 257 MG/DL (ref 70–130)
GLUCOSE BLDC GLUCOMTR-MCNC: 262 MG/DL (ref 70–130)
GLUCOSE BLDC GLUCOMTR-MCNC: 265 MG/DL (ref 70–130)
GLUCOSE BLDC GLUCOMTR-MCNC: 268 MG/DL (ref 70–130)
GLUCOSE BLDC GLUCOMTR-MCNC: 274 MG/DL (ref 70–130)
GLUCOSE BLDC GLUCOMTR-MCNC: 274 MG/DL (ref 70–130)
GLUCOSE BLDC GLUCOMTR-MCNC: 279 MG/DL (ref 70–130)
GLUCOSE BLDC GLUCOMTR-MCNC: 281 MG/DL (ref 70–130)
GLUCOSE BLDC GLUCOMTR-MCNC: 303 MG/DL (ref 70–130)
GLUCOSE BLDC GLUCOMTR-MCNC: 326 MG/DL (ref 70–130)
GLUCOSE BLDC GLUCOMTR-MCNC: 77 MG/DL (ref 70–130)
GLUCOSE BLDC GLUCOMTR-MCNC: 78 MG/DL (ref 70–130)
GLUCOSE BLDC GLUCOMTR-MCNC: 79 MG/DL (ref 70–130)
GLUCOSE BLDC GLUCOMTR-MCNC: 84 MG/DL (ref 70–130)
GLUCOSE BLDC GLUCOMTR-MCNC: 86 MG/DL (ref 70–130)
GLUCOSE BLDC GLUCOMTR-MCNC: 87 MG/DL (ref 70–130)
GLUCOSE BLDC GLUCOMTR-MCNC: 88 MG/DL (ref 70–130)
GLUCOSE BLDC GLUCOMTR-MCNC: 89 MG/DL (ref 70–130)
GLUCOSE BLDC GLUCOMTR-MCNC: 91 MG/DL (ref 70–130)
GLUCOSE BLDC GLUCOMTR-MCNC: 91 MG/DL (ref 70–130)
GLUCOSE BLDC GLUCOMTR-MCNC: 93 MG/DL (ref 70–130)
GLUCOSE BLDC GLUCOMTR-MCNC: 94 MG/DL (ref 70–130)
GLUCOSE BLDC GLUCOMTR-MCNC: 98 MG/DL (ref 70–130)
GLUCOSE BLDC GLUCOMTR-MCNC: 99 MG/DL (ref 70–130)
GLUCOSE SERPL-MCNC: 107 MG/DL (ref 65–99)
GLUCOSE SERPL-MCNC: 113 MG/DL (ref 65–99)
GLUCOSE SERPL-MCNC: 114 MG/DL (ref 65–99)
GLUCOSE SERPL-MCNC: 124 MG/DL (ref 65–99)
GLUCOSE SERPL-MCNC: 124 MG/DL (ref 65–99)
GLUCOSE SERPL-MCNC: 125 MG/DL (ref 65–99)
GLUCOSE SERPL-MCNC: 141 MG/DL (ref 65–99)
GLUCOSE SERPL-MCNC: 142 MG/DL (ref 65–99)
GLUCOSE SERPL-MCNC: 149 MG/DL (ref 65–99)
GLUCOSE SERPL-MCNC: 150 MG/DL (ref 65–99)
GLUCOSE SERPL-MCNC: 150 MG/DL (ref 65–99)
GLUCOSE SERPL-MCNC: 151 MG/DL (ref 65–99)
GLUCOSE SERPL-MCNC: 154 MG/DL (ref 65–99)
GLUCOSE SERPL-MCNC: 159 MG/DL (ref 65–99)
GLUCOSE SERPL-MCNC: 162 MG/DL (ref 65–99)
GLUCOSE SERPL-MCNC: 163 MG/DL (ref 65–99)
GLUCOSE SERPL-MCNC: 164 MG/DL (ref 65–99)
GLUCOSE SERPL-MCNC: 166 MG/DL (ref 65–99)
GLUCOSE SERPL-MCNC: 168 MG/DL (ref 65–99)
GLUCOSE SERPL-MCNC: 170 MG/DL (ref 65–99)
GLUCOSE SERPL-MCNC: 170 MG/DL (ref 65–99)
GLUCOSE SERPL-MCNC: 172 MG/DL (ref 65–99)
GLUCOSE SERPL-MCNC: 173 MG/DL (ref 65–99)
GLUCOSE SERPL-MCNC: 174 MG/DL (ref 65–99)
GLUCOSE SERPL-MCNC: 175 MG/DL (ref 65–99)
GLUCOSE SERPL-MCNC: 176 MG/DL (ref 65–99)
GLUCOSE SERPL-MCNC: 179 MG/DL (ref 65–99)
GLUCOSE SERPL-MCNC: 181 MG/DL (ref 65–99)
GLUCOSE SERPL-MCNC: 184 MG/DL (ref 65–99)
GLUCOSE SERPL-MCNC: 186 MG/DL (ref 65–99)
GLUCOSE SERPL-MCNC: 192 MG/DL (ref 65–99)
GLUCOSE SERPL-MCNC: 193 MG/DL (ref 65–99)
GLUCOSE SERPL-MCNC: 200 MG/DL (ref 65–99)
GLUCOSE SERPL-MCNC: 211 MG/DL (ref 65–99)
GLUCOSE SERPL-MCNC: 214 MG/DL (ref 65–99)
GLUCOSE SERPL-MCNC: 219 MG/DL (ref 65–99)
GLUCOSE SERPL-MCNC: 255 MG/DL (ref 65–99)
GLUCOSE SERPL-MCNC: 265 MG/DL (ref 65–99)
GLUCOSE SERPL-MCNC: 265 MG/DL (ref 65–99)
GLUCOSE SERPL-MCNC: 266 MG/DL (ref 65–99)
GLUCOSE SERPL-MCNC: 268 MG/DL (ref 65–99)
GLUCOSE SERPL-MCNC: 85 MG/DL (ref 65–99)
GLUCOSE SERPL-MCNC: 94 MG/DL (ref 65–99)
GLUCOSE SERPL-MCNC: 98 MG/DL (ref 65–99)
GLUCOSE UR STRIP-MCNC: NEGATIVE MG/DL
GRAM STN SPEC: ABNORMAL
GRAM STN SPEC: NORMAL
HADV DNA SPEC NAA+PROBE: NOT DETECTED
HBA1C MFR BLD: 6.5 % (ref 4.8–5.6)
HCO3 BLDA-SCNC: 44.6 MMOL/L (ref 20–26)
HCOV 229E RNA SPEC QL NAA+PROBE: NOT DETECTED
HCOV HKU1 RNA SPEC QL NAA+PROBE: NOT DETECTED
HCOV NL63 RNA SPEC QL NAA+PROBE: NOT DETECTED
HCOV OC43 RNA SPEC QL NAA+PROBE: NOT DETECTED
HCT VFR BLD AUTO: 35.6 % (ref 34–46.6)
HCT VFR BLD AUTO: 36.1 % (ref 34–46.6)
HCT VFR BLD AUTO: 36.4 % (ref 34–46.6)
HCT VFR BLD AUTO: 36.7 % (ref 34–46.6)
HCT VFR BLD AUTO: 36.9 % (ref 34–46.6)
HCT VFR BLD AUTO: 37.2 % (ref 34–46.6)
HCT VFR BLD AUTO: 37.4 % (ref 34–46.6)
HCT VFR BLD AUTO: 37.6 % (ref 34–46.6)
HCT VFR BLD AUTO: 38.6 % (ref 34–46.6)
HCT VFR BLD AUTO: 39.5 % (ref 34–46.6)
HCT VFR BLD AUTO: 39.8 % (ref 34–46.6)
HCT VFR BLD AUTO: 40 % (ref 34–46.6)
HCT VFR BLD AUTO: 40.1 % (ref 34–46.6)
HCT VFR BLD AUTO: 40.1 % (ref 34–46.6)
HCT VFR BLD AUTO: 40.2 % (ref 34–46.6)
HCT VFR BLD AUTO: 40.3 % (ref 34–46.6)
HCT VFR BLD AUTO: 40.4 % (ref 34–46.6)
HCT VFR BLD AUTO: 40.5 % (ref 34–46.6)
HCT VFR BLD AUTO: 40.7 % (ref 34–46.6)
HCT VFR BLD AUTO: 40.8 % (ref 34–46.6)
HCT VFR BLD AUTO: 41.1 % (ref 34–46.6)
HCT VFR BLD AUTO: 41.5 % (ref 34–46.6)
HCT VFR BLD AUTO: 41.6 % (ref 34–46.6)
HCT VFR BLD AUTO: 41.7 % (ref 34–46.6)
HCT VFR BLD AUTO: 42.1 % (ref 34–46.6)
HCT VFR BLD AUTO: 42.2 % (ref 34–46.6)
HCT VFR BLD AUTO: 42.5 % (ref 34–46.6)
HCT VFR BLD AUTO: 42.9 % (ref 34–46.6)
HCT VFR BLD AUTO: 43 % (ref 34–46.6)
HCT VFR BLD AUTO: 43 % (ref 34–46.6)
HCT VFR BLD AUTO: 43.6 % (ref 34–46.6)
HCT VFR BLD AUTO: 44 % (ref 34–46.6)
HCT VFR BLD AUTO: 44.5 % (ref 34–46.6)
HCT VFR BLD AUTO: 44.6 % (ref 34–46.6)
HCT VFR BLD AUTO: 44.9 % (ref 34–46.6)
HCT VFR BLD AUTO: 45.5 % (ref 34–46.6)
HCT VFR BLD AUTO: 45.7 % (ref 34–46.6)
HCT VFR BLD AUTO: 48.5 % (ref 34–46.6)
HCT VFR BLD CALC: 36.2 % (ref 38–51)
HGB BLD-MCNC: 10.8 G/DL (ref 12–15.9)
HGB BLD-MCNC: 11.2 G/DL (ref 12–15.9)
HGB BLD-MCNC: 11.3 G/DL (ref 12–15.9)
HGB BLD-MCNC: 11.4 G/DL (ref 12–15.9)
HGB BLD-MCNC: 11.5 G/DL (ref 12–15.9)
HGB BLD-MCNC: 11.5 G/DL (ref 12–15.9)
HGB BLD-MCNC: 11.6 G/DL (ref 12–15.9)
HGB BLD-MCNC: 11.6 G/DL (ref 12–15.9)
HGB BLD-MCNC: 11.7 G/DL (ref 12–15.9)
HGB BLD-MCNC: 11.8 G/DL (ref 12–15.9)
HGB BLD-MCNC: 11.9 G/DL (ref 12–15.9)
HGB BLD-MCNC: 12 G/DL (ref 12–15.9)
HGB BLD-MCNC: 12.1 G/DL (ref 12–15.9)
HGB BLD-MCNC: 12.2 G/DL (ref 12–15.9)
HGB BLD-MCNC: 12.2 G/DL (ref 12–15.9)
HGB BLD-MCNC: 12.3 G/DL (ref 12–15.9)
HGB BLD-MCNC: 12.4 G/DL (ref 12–15.9)
HGB BLD-MCNC: 12.5 G/DL (ref 12–15.9)
HGB BLD-MCNC: 12.5 G/DL (ref 12–15.9)
HGB BLD-MCNC: 12.7 G/DL (ref 12–15.9)
HGB BLD-MCNC: 12.7 G/DL (ref 12–15.9)
HGB BLD-MCNC: 12.8 G/DL (ref 12–15.9)
HGB BLD-MCNC: 13.1 G/DL (ref 12–15.9)
HGB BLD-MCNC: 13.2 G/DL (ref 12–15.9)
HGB BLD-MCNC: 13.3 G/DL (ref 12–15.9)
HGB BLD-MCNC: 14.1 G/DL (ref 12–15.9)
HGB BLD-MCNC: 14.1 G/DL (ref 12–15.9)
HGB BLDA-MCNC: 11.8 G/DL (ref 13.5–17.5)
HGB UR QL STRIP.AUTO: ABNORMAL
HMPV RNA NPH QL NAA+NON-PROBE: NOT DETECTED
HOLD SPECIMEN: NORMAL
HPIV1 RNA ISLT QL NAA+PROBE: NOT DETECTED
HPIV2 RNA SPEC QL NAA+PROBE: NOT DETECTED
HPIV3 RNA NPH QL NAA+PROBE: NOT DETECTED
HPIV4 P GENE NPH QL NAA+PROBE: NOT DETECTED
HYALINE CASTS UR QL AUTO: ABNORMAL /LPF
HYPOCHROMIA BLD QL: NORMAL
IMM GRANULOCYTES # BLD AUTO: 0.01 10*3/MM3 (ref 0–0.05)
IMM GRANULOCYTES # BLD AUTO: 0.02 10*3/MM3 (ref 0–0.05)
IMM GRANULOCYTES # BLD AUTO: 0.03 10*3/MM3 (ref 0–0.05)
IMM GRANULOCYTES # BLD AUTO: 0.04 10*3/MM3 (ref 0–0.05)
IMM GRANULOCYTES # BLD AUTO: 0.07 10*3/MM3 (ref 0–0.05)
IMM GRANULOCYTES NFR BLD AUTO: 0.1 % (ref 0–0.5)
IMM GRANULOCYTES NFR BLD AUTO: 0.2 % (ref 0–0.5)
IMM GRANULOCYTES NFR BLD AUTO: 0.3 % (ref 0–0.5)
IMM GRANULOCYTES NFR BLD AUTO: 0.4 % (ref 0–0.5)
IMM GRANULOCYTES NFR BLD AUTO: 0.5 % (ref 0–0.5)
IMM GRANULOCYTES NFR BLD AUTO: 0.6 % (ref 0–0.5)
IMM GRANULOCYTES NFR BLD AUTO: 0.6 % (ref 0–0.5)
INHALED O2 CONCENTRATION: 60 %
KETONES UR QL STRIP: NEGATIVE
L PNEUMO1 AG UR QL IA: NEGATIVE
LEFT ATRIUM VOLUME INDEX: 13.1 ML/M^2
LEFT ATRIUM VOLUME: 23.7 ML
LEUKOCYTE ESTERASE UR QL STRIP.AUTO: ABNORMAL
LYMPHOCYTES # BLD AUTO: 0.79 10*3/MM3 (ref 0.7–3.1)
LYMPHOCYTES # BLD AUTO: 0.98 10*3/MM3 (ref 0.7–3.1)
LYMPHOCYTES # BLD AUTO: 1.29 10*3/MM3 (ref 0.7–3.1)
LYMPHOCYTES # BLD AUTO: 1.39 10*3/MM3 (ref 0.7–3.1)
LYMPHOCYTES # BLD AUTO: 1.64 10*3/MM3 (ref 0.7–3.1)
LYMPHOCYTES # BLD AUTO: 1.65 10*3/MM3 (ref 0.7–3.1)
LYMPHOCYTES # BLD AUTO: 1.65 10*3/MM3 (ref 0.7–3.1)
LYMPHOCYTES # BLD AUTO: 1.83 10*3/MM3 (ref 0.7–3.1)
LYMPHOCYTES # BLD AUTO: 1.83 10*3/MM3 (ref 0.7–3.1)
LYMPHOCYTES # BLD AUTO: 1.86 10*3/MM3 (ref 0.7–3.1)
LYMPHOCYTES # BLD AUTO: 1.88 10*3/MM3 (ref 0.7–3.1)
LYMPHOCYTES # BLD AUTO: 1.88 10*3/MM3 (ref 0.7–3.1)
LYMPHOCYTES # BLD AUTO: 1.89 10*3/MM3 (ref 0.7–3.1)
LYMPHOCYTES # BLD AUTO: 1.9 10*3/MM3 (ref 0.7–3.1)
LYMPHOCYTES # BLD AUTO: 1.93 10*3/MM3 (ref 0.7–3.1)
LYMPHOCYTES # BLD AUTO: 2.05 10*3/MM3 (ref 0.7–3.1)
LYMPHOCYTES # BLD AUTO: 2.12 10*3/MM3 (ref 0.7–3.1)
LYMPHOCYTES # BLD AUTO: 2.15 10*3/MM3 (ref 0.7–3.1)
LYMPHOCYTES # BLD AUTO: 2.17 10*3/MM3 (ref 0.7–3.1)
LYMPHOCYTES # BLD AUTO: 2.21 10*3/MM3 (ref 0.7–3.1)
LYMPHOCYTES # BLD AUTO: 2.24 10*3/MM3 (ref 0.7–3.1)
LYMPHOCYTES # BLD AUTO: 2.26 10*3/MM3 (ref 0.7–3.1)
LYMPHOCYTES # BLD AUTO: 2.32 10*3/MM3 (ref 0.7–3.1)
LYMPHOCYTES # BLD AUTO: 2.37 10*3/MM3 (ref 0.7–3.1)
LYMPHOCYTES # BLD AUTO: 2.42 10*3/MM3 (ref 0.7–3.1)
LYMPHOCYTES # BLD AUTO: 2.43 10*3/MM3 (ref 0.7–3.1)
LYMPHOCYTES # BLD AUTO: 2.45 10*3/MM3 (ref 0.7–3.1)
LYMPHOCYTES # BLD AUTO: 2.46 10*3/MM3 (ref 0.7–3.1)
LYMPHOCYTES # BLD AUTO: 2.49 10*3/MM3 (ref 0.7–3.1)
LYMPHOCYTES # BLD AUTO: 2.52 10*3/MM3 (ref 0.7–3.1)
LYMPHOCYTES # BLD AUTO: 2.52 10*3/MM3 (ref 0.7–3.1)
LYMPHOCYTES # BLD AUTO: 2.55 10*3/MM3 (ref 0.7–3.1)
LYMPHOCYTES # BLD AUTO: 2.56 10*3/MM3 (ref 0.7–3.1)
LYMPHOCYTES # BLD AUTO: 2.65 10*3/MM3 (ref 0.7–3.1)
LYMPHOCYTES # BLD AUTO: 2.72 10*3/MM3 (ref 0.7–3.1)
LYMPHOCYTES # BLD AUTO: 2.77 10*3/MM3 (ref 0.7–3.1)
LYMPHOCYTES # BLD AUTO: 2.98 10*3/MM3 (ref 0.7–3.1)
LYMPHOCYTES # BLD AUTO: 3.02 10*3/MM3 (ref 0.7–3.1)
LYMPHOCYTES # BLD AUTO: 3.26 10*3/MM3 (ref 0.7–3.1)
LYMPHOCYTES NFR BLD AUTO: 15.2 % (ref 19.6–45.3)
LYMPHOCYTES NFR BLD AUTO: 16.4 % (ref 19.6–45.3)
LYMPHOCYTES NFR BLD AUTO: 18.8 % (ref 19.6–45.3)
LYMPHOCYTES NFR BLD AUTO: 20.1 % (ref 19.6–45.3)
LYMPHOCYTES NFR BLD AUTO: 22.2 % (ref 19.6–45.3)
LYMPHOCYTES NFR BLD AUTO: 22.8 % (ref 19.6–45.3)
LYMPHOCYTES NFR BLD AUTO: 27.7 % (ref 19.6–45.3)
LYMPHOCYTES NFR BLD AUTO: 28.1 % (ref 19.6–45.3)
LYMPHOCYTES NFR BLD AUTO: 29.9 % (ref 19.6–45.3)
LYMPHOCYTES NFR BLD AUTO: 30.4 % (ref 19.6–45.3)
LYMPHOCYTES NFR BLD AUTO: 30.8 % (ref 19.6–45.3)
LYMPHOCYTES NFR BLD AUTO: 31.4 % (ref 19.6–45.3)
LYMPHOCYTES NFR BLD AUTO: 31.7 % (ref 19.6–45.3)
LYMPHOCYTES NFR BLD AUTO: 31.9 % (ref 19.6–45.3)
LYMPHOCYTES NFR BLD AUTO: 32 % (ref 19.6–45.3)
LYMPHOCYTES NFR BLD AUTO: 32.9 % (ref 19.6–45.3)
LYMPHOCYTES NFR BLD AUTO: 33 % (ref 19.6–45.3)
LYMPHOCYTES NFR BLD AUTO: 33.3 % (ref 19.6–45.3)
LYMPHOCYTES NFR BLD AUTO: 33.9 % (ref 19.6–45.3)
LYMPHOCYTES NFR BLD AUTO: 34.1 % (ref 19.6–45.3)
LYMPHOCYTES NFR BLD AUTO: 34.4 % (ref 19.6–45.3)
LYMPHOCYTES NFR BLD AUTO: 34.5 % (ref 19.6–45.3)
LYMPHOCYTES NFR BLD AUTO: 34.5 % (ref 19.6–45.3)
LYMPHOCYTES NFR BLD AUTO: 34.7 % (ref 19.6–45.3)
LYMPHOCYTES NFR BLD AUTO: 35.4 % (ref 19.6–45.3)
LYMPHOCYTES NFR BLD AUTO: 36.6 % (ref 19.6–45.3)
LYMPHOCYTES NFR BLD AUTO: 36.8 % (ref 19.6–45.3)
LYMPHOCYTES NFR BLD AUTO: 36.8 % (ref 19.6–45.3)
LYMPHOCYTES NFR BLD AUTO: 36.9 % (ref 19.6–45.3)
LYMPHOCYTES NFR BLD AUTO: 37 % (ref 19.6–45.3)
LYMPHOCYTES NFR BLD AUTO: 37.6 % (ref 19.6–45.3)
LYMPHOCYTES NFR BLD AUTO: 38 % (ref 19.6–45.3)
LYMPHOCYTES NFR BLD AUTO: 38.1 % (ref 19.6–45.3)
LYMPHOCYTES NFR BLD AUTO: 38.3 % (ref 19.6–45.3)
LYMPHOCYTES NFR BLD AUTO: 39 % (ref 19.6–45.3)
LYMPHOCYTES NFR BLD AUTO: 39.2 % (ref 19.6–45.3)
LYMPHOCYTES NFR BLD AUTO: 40.3 % (ref 19.6–45.3)
LYMPHOCYTES NFR BLD AUTO: 40.7 % (ref 19.6–45.3)
LYMPHOCYTES NFR BLD AUTO: 41.6 % (ref 19.6–45.3)
LYMPHOCYTES NFR BLD AUTO: 43.2 % (ref 19.6–45.3)
LYMPHOCYTES NFR BLD AUTO: 47.8 % (ref 19.6–45.3)
Lab: ABNORMAL
Lab: ABNORMAL
M PNEUMO IGG SER IA-ACNC: NOT DETECTED
MACROCYTES BLD QL SMEAR: NORMAL
MAGNESIUM SERPL-MCNC: 2 MG/DL (ref 1.6–2.4)
MAGNESIUM SERPL-MCNC: 2.1 MG/DL (ref 1.6–2.4)
MAGNESIUM SERPL-MCNC: 2.2 MG/DL (ref 1.6–2.4)
MAGNESIUM SERPL-MCNC: 2.4 MG/DL (ref 1.6–2.4)
MAXIMAL PREDICTED HEART RATE: 157 BPM
MCH RBC QN AUTO: 27.5 PG (ref 26.6–33)
MCH RBC QN AUTO: 27.7 PG (ref 26.6–33)
MCH RBC QN AUTO: 27.9 PG (ref 26.6–33)
MCH RBC QN AUTO: 28 PG (ref 26.6–33)
MCH RBC QN AUTO: 28 PG (ref 26.6–33)
MCH RBC QN AUTO: 28.1 PG (ref 26.6–33)
MCH RBC QN AUTO: 28.1 PG (ref 26.6–33)
MCH RBC QN AUTO: 28.2 PG (ref 26.6–33)
MCH RBC QN AUTO: 28.3 PG (ref 26.6–33)
MCH RBC QN AUTO: 28.4 PG (ref 26.6–33)
MCH RBC QN AUTO: 28.5 PG (ref 26.6–33)
MCH RBC QN AUTO: 28.6 PG (ref 26.6–33)
MCH RBC QN AUTO: 28.7 PG (ref 26.6–33)
MCH RBC QN AUTO: 28.8 PG (ref 26.6–33)
MCH RBC QN AUTO: 28.9 PG (ref 26.6–33)
MCH RBC QN AUTO: 29 PG (ref 26.6–33)
MCH RBC QN AUTO: 29 PG (ref 26.6–33)
MCH RBC QN AUTO: 29.1 PG (ref 26.6–33)
MCH RBC QN AUTO: 29.1 PG (ref 26.6–33)
MCH RBC QN AUTO: 29.4 PG (ref 26.6–33)
MCH RBC QN AUTO: 29.6 PG (ref 26.6–33)
MCH RBC QN AUTO: 29.8 PG (ref 26.6–33)
MCHC RBC AUTO-ENTMCNC: 27.7 G/DL (ref 31.5–35.7)
MCHC RBC AUTO-ENTMCNC: 27.9 G/DL (ref 31.5–35.7)
MCHC RBC AUTO-ENTMCNC: 28.3 G/DL (ref 31.5–35.7)
MCHC RBC AUTO-ENTMCNC: 28.4 G/DL (ref 31.5–35.7)
MCHC RBC AUTO-ENTMCNC: 28.5 G/DL (ref 31.5–35.7)
MCHC RBC AUTO-ENTMCNC: 28.6 G/DL (ref 31.5–35.7)
MCHC RBC AUTO-ENTMCNC: 28.7 G/DL (ref 31.5–35.7)
MCHC RBC AUTO-ENTMCNC: 28.7 G/DL (ref 31.5–35.7)
MCHC RBC AUTO-ENTMCNC: 28.8 G/DL (ref 31.5–35.7)
MCHC RBC AUTO-ENTMCNC: 28.9 G/DL (ref 31.5–35.7)
MCHC RBC AUTO-ENTMCNC: 29 G/DL (ref 31.5–35.7)
MCHC RBC AUTO-ENTMCNC: 29.1 G/DL (ref 31.5–35.7)
MCHC RBC AUTO-ENTMCNC: 29.1 G/DL (ref 31.5–35.7)
MCHC RBC AUTO-ENTMCNC: 29.2 G/DL (ref 31.5–35.7)
MCHC RBC AUTO-ENTMCNC: 29.4 G/DL (ref 31.5–35.7)
MCHC RBC AUTO-ENTMCNC: 29.5 G/DL (ref 31.5–35.7)
MCHC RBC AUTO-ENTMCNC: 29.6 G/DL (ref 31.5–35.7)
MCHC RBC AUTO-ENTMCNC: 29.8 G/DL (ref 31.5–35.7)
MCHC RBC AUTO-ENTMCNC: 29.9 G/DL (ref 31.5–35.7)
MCHC RBC AUTO-ENTMCNC: 30 G/DL (ref 31.5–35.7)
MCHC RBC AUTO-ENTMCNC: 30.2 G/DL (ref 31.5–35.7)
MCHC RBC AUTO-ENTMCNC: 30.3 G/DL (ref 31.5–35.7)
MCHC RBC AUTO-ENTMCNC: 30.4 G/DL (ref 31.5–35.7)
MCHC RBC AUTO-ENTMCNC: 30.5 G/DL (ref 31.5–35.7)
MCHC RBC AUTO-ENTMCNC: 30.6 G/DL (ref 31.5–35.7)
MCHC RBC AUTO-ENTMCNC: 30.6 G/DL (ref 31.5–35.7)
MCHC RBC AUTO-ENTMCNC: 30.8 G/DL (ref 31.5–35.7)
MCHC RBC AUTO-ENTMCNC: 30.9 G/DL (ref 31.5–35.7)
MCHC RBC AUTO-ENTMCNC: 30.9 G/DL (ref 31.5–35.7)
MCHC RBC AUTO-ENTMCNC: 31 G/DL (ref 31.5–35.7)
MCHC RBC AUTO-ENTMCNC: 31.1 G/DL (ref 31.5–35.7)
MCHC RBC AUTO-ENTMCNC: 31.3 G/DL (ref 31.5–35.7)
MCHC RBC AUTO-ENTMCNC: 31.6 G/DL (ref 31.5–35.7)
MCHC RBC AUTO-ENTMCNC: 32 G/DL (ref 31.5–35.7)
MCV RBC AUTO: 100.2 FL (ref 79–97)
MCV RBC AUTO: 100.9 FL (ref 79–97)
MCV RBC AUTO: 92.2 FL (ref 79–97)
MCV RBC AUTO: 92.4 FL (ref 79–97)
MCV RBC AUTO: 92.4 FL (ref 79–97)
MCV RBC AUTO: 92.6 FL (ref 79–97)
MCV RBC AUTO: 93.1 FL (ref 79–97)
MCV RBC AUTO: 93.1 FL (ref 79–97)
MCV RBC AUTO: 93.2 FL (ref 79–97)
MCV RBC AUTO: 93.2 FL (ref 79–97)
MCV RBC AUTO: 93.7 FL (ref 79–97)
MCV RBC AUTO: 93.7 FL (ref 79–97)
MCV RBC AUTO: 94 FL (ref 79–97)
MCV RBC AUTO: 94 FL (ref 79–97)
MCV RBC AUTO: 94.2 FL (ref 79–97)
MCV RBC AUTO: 94.6 FL (ref 79–97)
MCV RBC AUTO: 94.9 FL (ref 79–97)
MCV RBC AUTO: 95.4 FL (ref 79–97)
MCV RBC AUTO: 95.4 FL (ref 79–97)
MCV RBC AUTO: 95.6 FL (ref 79–97)
MCV RBC AUTO: 95.8 FL (ref 79–97)
MCV RBC AUTO: 95.8 FL (ref 79–97)
MCV RBC AUTO: 96.2 FL (ref 79–97)
MCV RBC AUTO: 96.7 FL (ref 79–97)
MCV RBC AUTO: 96.9 FL (ref 79–97)
MCV RBC AUTO: 96.9 FL (ref 79–97)
MCV RBC AUTO: 97.3 FL (ref 79–97)
MCV RBC AUTO: 97.3 FL (ref 79–97)
MCV RBC AUTO: 97.4 FL (ref 79–97)
MCV RBC AUTO: 97.6 FL (ref 79–97)
MCV RBC AUTO: 97.6 FL (ref 79–97)
MCV RBC AUTO: 97.8 FL (ref 79–97)
MCV RBC AUTO: 98 FL (ref 79–97)
MCV RBC AUTO: 98.1 FL (ref 79–97)
MCV RBC AUTO: 98.2 FL (ref 79–97)
MCV RBC AUTO: 98.4 FL (ref 79–97)
MCV RBC AUTO: 98.4 FL (ref 79–97)
MCV RBC AUTO: 98.7 FL (ref 79–97)
MCV RBC AUTO: 99.3 FL (ref 79–97)
MCV RBC AUTO: 99.3 FL (ref 79–97)
METHGB BLD QL: 0.1 % (ref 0–3)
MODALITY: ABNORMAL
MONOCYTES # BLD AUTO: 0.11 10*3/MM3 (ref 0.1–0.9)
MONOCYTES # BLD AUTO: 0.35 10*3/MM3 (ref 0.1–0.9)
MONOCYTES # BLD AUTO: 0.38 10*3/MM3 (ref 0.1–0.9)
MONOCYTES # BLD AUTO: 0.42 10*3/MM3 (ref 0.1–0.9)
MONOCYTES # BLD AUTO: 0.43 10*3/MM3 (ref 0.1–0.9)
MONOCYTES # BLD AUTO: 0.44 10*3/MM3 (ref 0.1–0.9)
MONOCYTES # BLD AUTO: 0.45 10*3/MM3 (ref 0.1–0.9)
MONOCYTES # BLD AUTO: 0.45 10*3/MM3 (ref 0.1–0.9)
MONOCYTES # BLD AUTO: 0.47 10*3/MM3 (ref 0.1–0.9)
MONOCYTES # BLD AUTO: 0.48 10*3/MM3 (ref 0.1–0.9)
MONOCYTES # BLD AUTO: 0.48 10*3/MM3 (ref 0.1–0.9)
MONOCYTES # BLD AUTO: 0.51 10*3/MM3 (ref 0.1–0.9)
MONOCYTES # BLD AUTO: 0.51 10*3/MM3 (ref 0.1–0.9)
MONOCYTES # BLD AUTO: 0.52 10*3/MM3 (ref 0.1–0.9)
MONOCYTES # BLD AUTO: 0.53 10*3/MM3 (ref 0.1–0.9)
MONOCYTES # BLD AUTO: 0.53 10*3/MM3 (ref 0.1–0.9)
MONOCYTES # BLD AUTO: 0.55 10*3/MM3 (ref 0.1–0.9)
MONOCYTES # BLD AUTO: 0.56 10*3/MM3 (ref 0.1–0.9)
MONOCYTES # BLD AUTO: 0.57 10*3/MM3 (ref 0.1–0.9)
MONOCYTES # BLD AUTO: 0.57 10*3/MM3 (ref 0.1–0.9)
MONOCYTES # BLD AUTO: 0.61 10*3/MM3 (ref 0.1–0.9)
MONOCYTES # BLD AUTO: 0.62 10*3/MM3 (ref 0.1–0.9)
MONOCYTES # BLD AUTO: 0.62 10*3/MM3 (ref 0.1–0.9)
MONOCYTES # BLD AUTO: 0.64 10*3/MM3 (ref 0.1–0.9)
MONOCYTES # BLD AUTO: 0.66 10*3/MM3 (ref 0.1–0.9)
MONOCYTES # BLD AUTO: 0.66 10*3/MM3 (ref 0.1–0.9)
MONOCYTES # BLD AUTO: 0.67 10*3/MM3 (ref 0.1–0.9)
MONOCYTES # BLD AUTO: 0.69 10*3/MM3 (ref 0.1–0.9)
MONOCYTES # BLD AUTO: 0.71 10*3/MM3 (ref 0.1–0.9)
MONOCYTES # BLD AUTO: 0.72 10*3/MM3 (ref 0.1–0.9)
MONOCYTES # BLD AUTO: 0.72 10*3/MM3 (ref 0.1–0.9)
MONOCYTES # BLD AUTO: 0.74 10*3/MM3 (ref 0.1–0.9)
MONOCYTES # BLD AUTO: 0.75 10*3/MM3 (ref 0.1–0.9)
MONOCYTES # BLD AUTO: 0.78 10*3/MM3 (ref 0.1–0.9)
MONOCYTES # BLD AUTO: 0.79 10*3/MM3 (ref 0.1–0.9)
MONOCYTES # BLD AUTO: 0.81 10*3/MM3 (ref 0.1–0.9)
MONOCYTES # BLD AUTO: 0.82 10*3/MM3 (ref 0.1–0.9)
MONOCYTES # BLD AUTO: 1.02 10*3/MM3 (ref 0.1–0.9)
MONOCYTES # BLD AUTO: 1.12 10*3/MM3 (ref 0.1–0.9)
MONOCYTES NFR BLD AUTO: 10 % (ref 5–12)
MONOCYTES NFR BLD AUTO: 10.2 % (ref 5–12)
MONOCYTES NFR BLD AUTO: 10.2 % (ref 5–12)
MONOCYTES NFR BLD AUTO: 10.5 % (ref 5–12)
MONOCYTES NFR BLD AUTO: 10.6 % (ref 5–12)
MONOCYTES NFR BLD AUTO: 10.8 % (ref 5–12)
MONOCYTES NFR BLD AUTO: 10.9 % (ref 5–12)
MONOCYTES NFR BLD AUTO: 11.1 % (ref 5–12)
MONOCYTES NFR BLD AUTO: 11.3 % (ref 5–12)
MONOCYTES NFR BLD AUTO: 11.5 % (ref 5–12)
MONOCYTES NFR BLD AUTO: 11.6 % (ref 5–12)
MONOCYTES NFR BLD AUTO: 11.6 % (ref 5–12)
MONOCYTES NFR BLD AUTO: 11.8 % (ref 5–12)
MONOCYTES NFR BLD AUTO: 2.3 % (ref 5–12)
MONOCYTES NFR BLD AUTO: 6.1 % (ref 5–12)
MONOCYTES NFR BLD AUTO: 6.8 % (ref 5–12)
MONOCYTES NFR BLD AUTO: 6.8 % (ref 5–12)
MONOCYTES NFR BLD AUTO: 7.2 % (ref 5–12)
MONOCYTES NFR BLD AUTO: 7.3 % (ref 5–12)
MONOCYTES NFR BLD AUTO: 7.5 % (ref 5–12)
MONOCYTES NFR BLD AUTO: 7.7 % (ref 5–12)
MONOCYTES NFR BLD AUTO: 8 % (ref 5–12)
MONOCYTES NFR BLD AUTO: 8.1 % (ref 5–12)
MONOCYTES NFR BLD AUTO: 8.2 % (ref 5–12)
MONOCYTES NFR BLD AUTO: 8.3 % (ref 5–12)
MONOCYTES NFR BLD AUTO: 8.3 % (ref 5–12)
MONOCYTES NFR BLD AUTO: 8.4 % (ref 5–12)
MONOCYTES NFR BLD AUTO: 8.6 % (ref 5–12)
MONOCYTES NFR BLD AUTO: 8.7 % (ref 5–12)
MONOCYTES NFR BLD AUTO: 8.8 % (ref 5–12)
MONOCYTES NFR BLD AUTO: 8.8 % (ref 5–12)
MONOCYTES NFR BLD AUTO: 8.9 % (ref 5–12)
MONOCYTES NFR BLD AUTO: 8.9 % (ref 5–12)
MONOCYTES NFR BLD AUTO: 9 % (ref 5–12)
MONOCYTES NFR BLD AUTO: 9.3 % (ref 5–12)
MONOCYTES NFR BLD AUTO: 9.4 % (ref 5–12)
MRSA DNA SPEC QL NAA+PROBE: NEGATIVE
NEUTROPHILS NFR BLD AUTO: 1.7 10*3/MM3 (ref 1.7–7)
NEUTROPHILS NFR BLD AUTO: 1.72 10*3/MM3 (ref 1.7–7)
NEUTROPHILS NFR BLD AUTO: 1.75 10*3/MM3 (ref 1.7–7)
NEUTROPHILS NFR BLD AUTO: 1.86 10*3/MM3 (ref 1.7–7)
NEUTROPHILS NFR BLD AUTO: 12.71 10*3/MM3 (ref 1.7–7)
NEUTROPHILS NFR BLD AUTO: 2.1 10*3/MM3 (ref 1.7–7)
NEUTROPHILS NFR BLD AUTO: 2.24 10*3/MM3 (ref 1.7–7)
NEUTROPHILS NFR BLD AUTO: 2.34 10*3/MM3 (ref 1.7–7)
NEUTROPHILS NFR BLD AUTO: 2.34 10*3/MM3 (ref 1.7–7)
NEUTROPHILS NFR BLD AUTO: 2.49 10*3/MM3 (ref 1.7–7)
NEUTROPHILS NFR BLD AUTO: 2.55 10*3/MM3 (ref 1.7–7)
NEUTROPHILS NFR BLD AUTO: 2.55 10*3/MM3 (ref 1.7–7)
NEUTROPHILS NFR BLD AUTO: 2.57 10*3/MM3 (ref 1.7–7)
NEUTROPHILS NFR BLD AUTO: 2.73 10*3/MM3 (ref 1.7–7)
NEUTROPHILS NFR BLD AUTO: 2.75 10*3/MM3 (ref 1.7–7)
NEUTROPHILS NFR BLD AUTO: 2.78 10*3/MM3 (ref 1.7–7)
NEUTROPHILS NFR BLD AUTO: 2.82 10*3/MM3 (ref 1.7–7)
NEUTROPHILS NFR BLD AUTO: 2.88 10*3/MM3 (ref 1.7–7)
NEUTROPHILS NFR BLD AUTO: 2.92 10*3/MM3 (ref 1.7–7)
NEUTROPHILS NFR BLD AUTO: 2.92 10*3/MM3 (ref 1.7–7)
NEUTROPHILS NFR BLD AUTO: 2.96 10*3/MM3 (ref 1.7–7)
NEUTROPHILS NFR BLD AUTO: 2.98 10*3/MM3 (ref 1.7–7)
NEUTROPHILS NFR BLD AUTO: 3.02 10*3/MM3 (ref 1.7–7)
NEUTROPHILS NFR BLD AUTO: 3.02 10*3/MM3 (ref 1.7–7)
NEUTROPHILS NFR BLD AUTO: 3.11 10*3/MM3 (ref 1.7–7)
NEUTROPHILS NFR BLD AUTO: 3.29 10*3/MM3 (ref 1.7–7)
NEUTROPHILS NFR BLD AUTO: 3.45 10*3/MM3 (ref 1.7–7)
NEUTROPHILS NFR BLD AUTO: 3.47 10*3/MM3 (ref 1.7–7)
NEUTROPHILS NFR BLD AUTO: 3.48 10*3/MM3 (ref 1.7–7)
NEUTROPHILS NFR BLD AUTO: 3.5 10*3/MM3 (ref 1.7–7)
NEUTROPHILS NFR BLD AUTO: 3.52 10*3/MM3 (ref 1.7–7)
NEUTROPHILS NFR BLD AUTO: 3.69 10*3/MM3 (ref 1.7–7)
NEUTROPHILS NFR BLD AUTO: 3.76 10*3/MM3 (ref 1.7–7)
NEUTROPHILS NFR BLD AUTO: 3.88 10*3/MM3 (ref 1.7–7)
NEUTROPHILS NFR BLD AUTO: 32 % (ref 42.7–76)
NEUTROPHILS NFR BLD AUTO: 36.9 % (ref 42.7–76)
NEUTROPHILS NFR BLD AUTO: 39.9 % (ref 42.7–76)
NEUTROPHILS NFR BLD AUTO: 4.26 10*3/MM3 (ref 1.7–7)
NEUTROPHILS NFR BLD AUTO: 4.47 10*3/MM3 (ref 1.7–7)
NEUTROPHILS NFR BLD AUTO: 4.5 10*3/MM3 (ref 1.7–7)
NEUTROPHILS NFR BLD AUTO: 4.76 10*3/MM3 (ref 1.7–7)
NEUTROPHILS NFR BLD AUTO: 40 % (ref 42.7–76)
NEUTROPHILS NFR BLD AUTO: 40.3 % (ref 42.7–76)
NEUTROPHILS NFR BLD AUTO: 40.7 % (ref 42.7–76)
NEUTROPHILS NFR BLD AUTO: 40.9 % (ref 42.7–76)
NEUTROPHILS NFR BLD AUTO: 41.9 % (ref 42.7–76)
NEUTROPHILS NFR BLD AUTO: 42.1 % (ref 42.7–76)
NEUTROPHILS NFR BLD AUTO: 42.6 % (ref 42.7–76)
NEUTROPHILS NFR BLD AUTO: 43.2 % (ref 42.7–76)
NEUTROPHILS NFR BLD AUTO: 43.4 % (ref 42.7–76)
NEUTROPHILS NFR BLD AUTO: 44.9 % (ref 42.7–76)
NEUTROPHILS NFR BLD AUTO: 46.1 % (ref 42.7–76)
NEUTROPHILS NFR BLD AUTO: 46.4 % (ref 42.7–76)
NEUTROPHILS NFR BLD AUTO: 46.8 % (ref 42.7–76)
NEUTROPHILS NFR BLD AUTO: 46.9 % (ref 42.7–76)
NEUTROPHILS NFR BLD AUTO: 47.3 % (ref 42.7–76)
NEUTROPHILS NFR BLD AUTO: 47.7 % (ref 42.7–76)
NEUTROPHILS NFR BLD AUTO: 48.1 % (ref 42.7–76)
NEUTROPHILS NFR BLD AUTO: 48.1 % (ref 42.7–76)
NEUTROPHILS NFR BLD AUTO: 48.5 % (ref 42.7–76)
NEUTROPHILS NFR BLD AUTO: 49.3 % (ref 42.7–76)
NEUTROPHILS NFR BLD AUTO: 49.7 % (ref 42.7–76)
NEUTROPHILS NFR BLD AUTO: 5.76 10*3/MM3 (ref 1.7–7)
NEUTROPHILS NFR BLD AUTO: 5.96 10*3/MM3 (ref 1.7–7)
NEUTROPHILS NFR BLD AUTO: 50.2 % (ref 42.7–76)
NEUTROPHILS NFR BLD AUTO: 50.4 % (ref 42.7–76)
NEUTROPHILS NFR BLD AUTO: 50.5 % (ref 42.7–76)
NEUTROPHILS NFR BLD AUTO: 50.8 % (ref 42.7–76)
NEUTROPHILS NFR BLD AUTO: 51 % (ref 42.7–76)
NEUTROPHILS NFR BLD AUTO: 51.1 % (ref 42.7–76)
NEUTROPHILS NFR BLD AUTO: 51.3 % (ref 42.7–76)
NEUTROPHILS NFR BLD AUTO: 51.4 % (ref 42.7–76)
NEUTROPHILS NFR BLD AUTO: 55.7 % (ref 42.7–76)
NEUTROPHILS NFR BLD AUTO: 57.9 % (ref 42.7–76)
NEUTROPHILS NFR BLD AUTO: 58.3 % (ref 42.7–76)
NEUTROPHILS NFR BLD AUTO: 58.6 % (ref 42.7–76)
NEUTROPHILS NFR BLD AUTO: 64.4 % (ref 42.7–76)
NEUTROPHILS NFR BLD AUTO: 65.3 % (ref 42.7–76)
NEUTROPHILS NFR BLD AUTO: 67.5 % (ref 42.7–76)
NEUTROPHILS NFR BLD AUTO: 7.36 10*3/MM3 (ref 1.7–7)
NEUTROPHILS NFR BLD AUTO: 77 % (ref 42.7–76)
NEUTROPHILS NFR BLD AUTO: 80.7 % (ref 42.7–76)
NITRITE UR QL STRIP: NEGATIVE
NOTE: ABNORMAL
NOTIFIED BY: ABNORMAL
NOTIFIED WHO: ABNORMAL
NRBC BLD AUTO-RTO: 0 /100 WBC (ref 0–0.2)
NRBC BLD AUTO-RTO: 0.2 /100 WBC (ref 0–0.2)
NT-PROBNP SERPL-MCNC: 2686 PG/ML (ref 0–900)
NT-PROBNP SERPL-MCNC: 2721 PG/ML (ref 0–900)
OXYHGB MFR BLDV: 95.8 % (ref 94–99)
PCO2 BLDA: 68.9 MM HG (ref 35–45)
PCO2 TEMP ADJ BLD: ABNORMAL MM[HG]
PH BLDA: 7.42 PH UNITS (ref 7.35–7.45)
PH UR STRIP.AUTO: 6.5 [PH] (ref 5–8)
PH, TEMP CORRECTED: ABNORMAL
PHOSPHATE SERPL-MCNC: 2.5 MG/DL (ref 2.5–4.5)
PLAT MORPH BLD: NORMAL
PLATELET # BLD AUTO: 140 10*3/MM3 (ref 140–450)
PLATELET # BLD AUTO: 173 10*3/MM3 (ref 140–450)
PLATELET # BLD AUTO: 177 10*3/MM3 (ref 140–450)
PLATELET # BLD AUTO: 193 10*3/MM3 (ref 140–450)
PLATELET # BLD AUTO: 200 10*3/MM3 (ref 140–450)
PLATELET # BLD AUTO: 204 10*3/MM3 (ref 140–450)
PLATELET # BLD AUTO: 205 10*3/MM3 (ref 140–450)
PLATELET # BLD AUTO: 206 10*3/MM3 (ref 140–450)
PLATELET # BLD AUTO: 208 10*3/MM3 (ref 140–450)
PLATELET # BLD AUTO: 208 10*3/MM3 (ref 140–450)
PLATELET # BLD AUTO: 212 10*3/MM3 (ref 140–450)
PLATELET # BLD AUTO: 214 10*3/MM3 (ref 140–450)
PLATELET # BLD AUTO: 215 10*3/MM3 (ref 140–450)
PLATELET # BLD AUTO: 216 10*3/MM3 (ref 140–450)
PLATELET # BLD AUTO: 216 10*3/MM3 (ref 140–450)
PLATELET # BLD AUTO: 218 10*3/MM3 (ref 140–450)
PLATELET # BLD AUTO: 220 10*3/MM3 (ref 140–450)
PLATELET # BLD AUTO: 222 10*3/MM3 (ref 140–450)
PLATELET # BLD AUTO: 223 10*3/MM3 (ref 140–450)
PLATELET # BLD AUTO: 223 10*3/MM3 (ref 140–450)
PLATELET # BLD AUTO: 225 10*3/MM3 (ref 140–450)
PLATELET # BLD AUTO: 226 10*3/MM3 (ref 140–450)
PLATELET # BLD AUTO: 229 10*3/MM3 (ref 140–450)
PLATELET # BLD AUTO: 231 10*3/MM3 (ref 140–450)
PLATELET # BLD AUTO: 232 10*3/MM3 (ref 140–450)
PLATELET # BLD AUTO: 232 10*3/MM3 (ref 140–450)
PLATELET # BLD AUTO: 233 10*3/MM3 (ref 140–450)
PLATELET # BLD AUTO: 234 10*3/MM3 (ref 140–450)
PLATELET # BLD AUTO: 238 10*3/MM3 (ref 140–450)
PLATELET # BLD AUTO: 241 10*3/MM3 (ref 140–450)
PLATELET # BLD AUTO: 247 10*3/MM3 (ref 140–450)
PLATELET # BLD AUTO: 249 10*3/MM3 (ref 140–450)
PLATELET # BLD AUTO: 256 10*3/MM3 (ref 140–450)
PLATELET # BLD AUTO: 256 10*3/MM3 (ref 140–450)
PLATELET # BLD AUTO: 268 10*3/MM3 (ref 140–450)
PLATELET # BLD AUTO: 269 10*3/MM3 (ref 140–450)
PLATELET # BLD AUTO: 275 10*3/MM3 (ref 140–450)
PLATELET # BLD AUTO: 316 10*3/MM3 (ref 140–450)
PMV BLD AUTO: 10 FL (ref 6–12)
PMV BLD AUTO: 10.2 FL (ref 6–12)
PMV BLD AUTO: 10.2 FL (ref 6–12)
PMV BLD AUTO: 10.3 FL (ref 6–12)
PMV BLD AUTO: 10.4 FL (ref 6–12)
PMV BLD AUTO: 10.5 FL (ref 6–12)
PMV BLD AUTO: 10.7 FL (ref 6–12)
PMV BLD AUTO: 10.8 FL (ref 6–12)
PMV BLD AUTO: 10.9 FL (ref 6–12)
PMV BLD AUTO: 11 FL (ref 6–12)
PMV BLD AUTO: 11.1 FL (ref 6–12)
PMV BLD AUTO: 11.2 FL (ref 6–12)
PMV BLD AUTO: 11.3 FL (ref 6–12)
PMV BLD AUTO: 11.4 FL (ref 6–12)
PMV BLD AUTO: 11.7 FL (ref 6–12)
PMV BLD AUTO: 11.7 FL (ref 6–12)
PMV BLD AUTO: 11.8 FL (ref 6–12)
PMV BLD AUTO: 11.9 FL (ref 6–12)
PMV BLD AUTO: 12.1 FL (ref 6–12)
PMV BLD AUTO: 12.2 FL (ref 6–12)
PO2 BLDA: 88.4 MM HG (ref 83–108)
PO2 TEMP ADJ BLD: ABNORMAL MM[HG]
POTASSIUM SERPL-SCNC: 2.8 MMOL/L (ref 3.5–5.2)
POTASSIUM SERPL-SCNC: 3.2 MMOL/L (ref 3.5–5.2)
POTASSIUM SERPL-SCNC: 3.3 MMOL/L (ref 3.5–5.2)
POTASSIUM SERPL-SCNC: 3.4 MMOL/L (ref 3.5–5.2)
POTASSIUM SERPL-SCNC: 3.5 MMOL/L (ref 3.5–5.2)
POTASSIUM SERPL-SCNC: 3.6 MMOL/L (ref 3.5–5.2)
POTASSIUM SERPL-SCNC: 3.7 MMOL/L (ref 3.5–5.2)
POTASSIUM SERPL-SCNC: 3.8 MMOL/L (ref 3.5–5.2)
POTASSIUM SERPL-SCNC: 3.8 MMOL/L (ref 3.5–5.2)
POTASSIUM SERPL-SCNC: 3.9 MMOL/L (ref 3.5–5.2)
POTASSIUM SERPL-SCNC: 4 MMOL/L (ref 3.5–5.2)
POTASSIUM SERPL-SCNC: 4.1 MMOL/L (ref 3.5–5.2)
POTASSIUM SERPL-SCNC: 4.1 MMOL/L (ref 3.5–5.2)
POTASSIUM SERPL-SCNC: 4.2 MMOL/L (ref 3.5–5.2)
POTASSIUM SERPL-SCNC: 4.4 MMOL/L (ref 3.5–5.2)
POTASSIUM SERPL-SCNC: 4.8 MMOL/L (ref 3.5–5.2)
POTASSIUM SERPL-SCNC: 4.8 MMOL/L (ref 3.5–5.2)
PREALB SERPL-MCNC: 21.3 MG/DL (ref 20–40)
PREALB SERPL-MCNC: 9.8 MG/DL (ref 20–40)
PROCALCITONIN SERPL-MCNC: 0.06 NG/ML (ref 0–0.25)
PROCALCITONIN SERPL-MCNC: 0.17 NG/ML (ref 0–0.25)
PROT SERPL-MCNC: 6.3 G/DL (ref 6–8.5)
PROT SERPL-MCNC: 6.4 G/DL (ref 6–8.5)
PROT SERPL-MCNC: 6.5 G/DL (ref 6–8.5)
PROT SERPL-MCNC: 6.5 G/DL (ref 6–8.5)
PROT SERPL-MCNC: 6.6 G/DL (ref 6–8.5)
PROT SERPL-MCNC: 6.7 G/DL (ref 6–8.5)
PROT SERPL-MCNC: 6.8 G/DL (ref 6–8.5)
PROT SERPL-MCNC: 6.8 G/DL (ref 6–8.5)
PROT SERPL-MCNC: 6.9 G/DL (ref 6–8.5)
PROT SERPL-MCNC: 7 G/DL (ref 6–8.5)
PROT SERPL-MCNC: 7 G/DL (ref 6–8.5)
PROT SERPL-MCNC: 7.1 G/DL (ref 6–8.5)
PROT SERPL-MCNC: 7.1 G/DL (ref 6–8.5)
PROT SERPL-MCNC: 7.2 G/DL (ref 6–8.5)
PROT SERPL-MCNC: 7.2 G/DL (ref 6–8.5)
PROT SERPL-MCNC: 7.5 G/DL (ref 6–8.5)
PROT SERPL-MCNC: 7.6 G/DL (ref 6–8.5)
PROT SERPL-MCNC: 7.7 G/DL (ref 6–8.5)
PROT SERPL-MCNC: 7.7 G/DL (ref 6–8.5)
PROT SERPL-MCNC: 7.9 G/DL (ref 6–8.5)
PROT UR QL STRIP: NEGATIVE
QT INTERVAL: 340 MS
QT INTERVAL: 394 MS
QTC INTERVAL: 460 MS
QTC INTERVAL: 482 MS
RBC # BLD AUTO: 3.77 10*6/MM3 (ref 3.77–5.28)
RBC # BLD AUTO: 3.82 10*6/MM3 (ref 3.77–5.28)
RBC # BLD AUTO: 3.89 10*6/MM3 (ref 3.77–5.28)
RBC # BLD AUTO: 3.89 10*6/MM3 (ref 3.77–5.28)
RBC # BLD AUTO: 3.92 10*6/MM3 (ref 3.77–5.28)
RBC # BLD AUTO: 3.95 10*6/MM3 (ref 3.77–5.28)
RBC # BLD AUTO: 3.97 10*6/MM3 (ref 3.77–5.28)
RBC # BLD AUTO: 4.04 10*6/MM3 (ref 3.77–5.28)
RBC # BLD AUTO: 4.08 10*6/MM3 (ref 3.77–5.28)
RBC # BLD AUTO: 4.1 10*6/MM3 (ref 3.77–5.28)
RBC # BLD AUTO: 4.1 10*6/MM3 (ref 3.77–5.28)
RBC # BLD AUTO: 4.11 10*6/MM3 (ref 3.77–5.28)
RBC # BLD AUTO: 4.12 10*6/MM3 (ref 3.77–5.28)
RBC # BLD AUTO: 4.12 10*6/MM3 (ref 3.77–5.28)
RBC # BLD AUTO: 4.13 10*6/MM3 (ref 3.77–5.28)
RBC # BLD AUTO: 4.13 10*6/MM3 (ref 3.77–5.28)
RBC # BLD AUTO: 4.14 10*6/MM3 (ref 3.77–5.28)
RBC # BLD AUTO: 4.14 10*6/MM3 (ref 3.77–5.28)
RBC # BLD AUTO: 4.16 10*6/MM3 (ref 3.77–5.28)
RBC # BLD AUTO: 4.22 10*6/MM3 (ref 3.77–5.28)
RBC # BLD AUTO: 4.25 10*6/MM3 (ref 3.77–5.28)
RBC # BLD AUTO: 4.26 10*6/MM3 (ref 3.77–5.28)
RBC # BLD AUTO: 4.26 10*6/MM3 (ref 3.77–5.28)
RBC # BLD AUTO: 4.27 10*6/MM3 (ref 3.77–5.28)
RBC # BLD AUTO: 4.28 10*6/MM3 (ref 3.77–5.28)
RBC # BLD AUTO: 4.29 10*6/MM3 (ref 3.77–5.28)
RBC # BLD AUTO: 4.3 10*6/MM3 (ref 3.77–5.28)
RBC # BLD AUTO: 4.32 10*6/MM3 (ref 3.77–5.28)
RBC # BLD AUTO: 4.33 10*6/MM3 (ref 3.77–5.28)
RBC # BLD AUTO: 4.36 10*6/MM3 (ref 3.77–5.28)
RBC # BLD AUTO: 4.37 10*6/MM3 (ref 3.77–5.28)
RBC # BLD AUTO: 4.42 10*6/MM3 (ref 3.77–5.28)
RBC # BLD AUTO: 4.48 10*6/MM3 (ref 3.77–5.28)
RBC # BLD AUTO: 4.48 10*6/MM3 (ref 3.77–5.28)
RBC # BLD AUTO: 4.52 10*6/MM3 (ref 3.77–5.28)
RBC # BLD AUTO: 4.53 10*6/MM3 (ref 3.77–5.28)
RBC # BLD AUTO: 4.54 10*6/MM3 (ref 3.77–5.28)
RBC # BLD AUTO: 4.59 10*6/MM3 (ref 3.77–5.28)
RBC # BLD AUTO: 4.63 10*6/MM3 (ref 3.77–5.28)
RBC # BLD AUTO: 4.66 10*6/MM3 (ref 3.77–5.28)
RBC # BLD AUTO: 4.79 10*6/MM3 (ref 3.77–5.28)
RBC # BLD AUTO: 4.84 10*6/MM3 (ref 3.77–5.28)
RBC # UR STRIP: ABNORMAL /HPF
RBC MORPH BLD: NORMAL
REF LAB TEST METHOD: ABNORMAL
RHINOVIRUS RNA SPEC NAA+PROBE: NOT DETECTED
RSV RNA NPH QL NAA+NON-PROBE: NOT DETECTED
S PNEUM AG SPEC QL LA: NEGATIVE
SAO2 % BLDCOA: 97.3 % (ref 94–99)
SARS-COV-2 RDRP RESP QL NAA+PROBE: NORMAL
SARS-COV-2 RNA NPH QL NAA+NON-PROBE: NOT DETECTED
SARS-COV-2 RNA PNL SPEC NAA+PROBE: NOT DETECTED
SARS-COV-2 RNA RESP QL NAA+PROBE: NOT DETECTED
SMALL PLATELETS BLD QL SMEAR: ADEQUATE
SMALL PLATELETS BLD QL SMEAR: ADEQUATE
SODIUM SERPL-SCNC: 135 MMOL/L (ref 136–145)
SODIUM SERPL-SCNC: 137 MMOL/L (ref 136–145)
SODIUM SERPL-SCNC: 138 MMOL/L (ref 136–145)
SODIUM SERPL-SCNC: 138 MMOL/L (ref 136–145)
SODIUM SERPL-SCNC: 139 MMOL/L (ref 136–145)
SODIUM SERPL-SCNC: 140 MMOL/L (ref 136–145)
SODIUM SERPL-SCNC: 140 MMOL/L (ref 136–145)
SODIUM SERPL-SCNC: 141 MMOL/L (ref 136–145)
SODIUM SERPL-SCNC: 142 MMOL/L (ref 136–145)
SODIUM SERPL-SCNC: 143 MMOL/L (ref 136–145)
SODIUM SERPL-SCNC: 144 MMOL/L (ref 136–145)
SODIUM SERPL-SCNC: 145 MMOL/L (ref 136–145)
SODIUM SERPL-SCNC: 146 MMOL/L (ref 136–145)
SODIUM SERPL-SCNC: 146 MMOL/L (ref 136–145)
SODIUM SERPL-SCNC: 147 MMOL/L (ref 136–145)
SODIUM SERPL-SCNC: 148 MMOL/L (ref 136–145)
SODIUM SERPL-SCNC: 149 MMOL/L (ref 136–145)
SODIUM SERPL-SCNC: 149 MMOL/L (ref 136–145)
SODIUM SERPL-SCNC: 151 MMOL/L (ref 136–145)
SODIUM SERPL-SCNC: 151 MMOL/L (ref 136–145)
SP GR UR STRIP: 1.04 (ref 1–1.03)
SQUAMOUS #/AREA URNS HPF: ABNORMAL /HPF
STRESS TARGET HR: 133 BPM
TOTAL RATE: 30 BREATHS/MINUTE
TRIGL SERPL-MCNC: 146 MG/DL (ref 0–150)
TROPONIN T SERPL-MCNC: 0.02 NG/ML (ref 0–0.03)
UROBILINOGEN UR QL STRIP: ABNORMAL
VENTILATOR MODE: ABNORMAL
WBC # BLD AUTO: 10.9 10*3/MM3 (ref 3.4–10.8)
WBC # BLD AUTO: 4.1 10*3/MM3 (ref 3.4–10.8)
WBC # BLD AUTO: 4.31 10*3/MM3 (ref 3.4–10.8)
WBC # BLD AUTO: 4.62 10*3/MM3 (ref 3.4–10.8)
WBC # BLD AUTO: 4.75 10*3/MM3 (ref 3.4–10.8)
WBC # BLD AUTO: 4.87 10*3/MM3 (ref 3.4–10.8)
WBC # BLD AUTO: 5.16 10*3/MM3 (ref 3.4–10.8)
WBC # BLD AUTO: 5.33 10*3/MM3 (ref 3.4–10.8)
WBC # BLD AUTO: 5.39 10*3/MM3 (ref 3.4–10.8)
WBC # BLD AUTO: 5.42 10*3/MM3 (ref 3.4–10.8)
WBC # BLD AUTO: 5.69 10*3/MM3 (ref 3.4–10.8)
WBC # BLD AUTO: 5.71 10*3/MM3 (ref 3.4–10.8)
WBC # BLD AUTO: 5.72 10*3/MM3 (ref 3.4–10.8)
WBC # BLD AUTO: 5.75 10*3/MM3 (ref 3.4–10.8)
WBC # BLD AUTO: 5.95 10*3/MM3 (ref 3.4–10.8)
WBC # BLD AUTO: 6 10*3/MM3 (ref 3.4–10.8)
WBC # BLD AUTO: 6.15 10*3/MM3 (ref 3.4–10.8)
WBC # BLD AUTO: 6.23 10*3/MM3 (ref 3.4–10.8)
WBC # BLD AUTO: 6.31 10*3/MM3 (ref 3.4–10.8)
WBC # BLD AUTO: 6.41 10*3/MM3 (ref 3.4–10.8)
WBC # BLD AUTO: 6.46 10*3/MM3 (ref 3.4–10.8)
WBC # BLD AUTO: 6.56 10*3/MM3 (ref 3.4–10.8)
WBC # BLD AUTO: 6.86 10*3/MM3 (ref 3.4–10.8)
WBC # BLD AUTO: 6.88 10*3/MM3 (ref 3.4–10.8)
WBC # BLD AUTO: 7.14 10*3/MM3 (ref 3.4–10.8)
WBC # BLD AUTO: 7.31 10*3/MM3 (ref 3.4–10.8)
WBC # BLD AUTO: 7.39 10*3/MM3 (ref 3.4–10.8)
WBC # BLD AUTO: 7.49 10*3/MM3 (ref 3.4–10.8)
WBC # BLD AUTO: 8.86 10*3/MM3 (ref 3.4–10.8)
WBC # BLD AUTO: 9.87 10*3/MM3 (ref 3.4–10.8)
WBC # UR STRIP: ABNORMAL /HPF
WBC MORPH BLD: NORMAL
WBC NRBC COR # BLD: 16.53 10*3/MM3 (ref 3.4–10.8)
WBC NRBC COR # BLD: 4.81 10*3/MM3 (ref 3.4–10.8)
WBC NRBC COR # BLD: 6.2 10*3/MM3 (ref 3.4–10.8)
WBC NRBC COR # BLD: 6.29 10*3/MM3 (ref 3.4–10.8)
WBC NRBC COR # BLD: 6.37 10*3/MM3 (ref 3.4–10.8)
WBC NRBC COR # BLD: 6.39 10*3/MM3 (ref 3.4–10.8)
WBC NRBC COR # BLD: 7.24 10*3/MM3 (ref 3.4–10.8)
WBC NRBC COR # BLD: 7.35 10*3/MM3 (ref 3.4–10.8)
WBC NRBC COR # BLD: 7.64 10*3/MM3 (ref 3.4–10.8)
WBC NRBC COR # BLD: 8.22 10*3/MM3 (ref 3.4–10.8)
WBC NRBC COR # BLD: 9.25 10*3/MM3 (ref 3.4–10.8)
WBC NRBC COR # BLD: 9.35 10*3/MM3 (ref 3.4–10.8)
WHOLE BLOOD HOLD SPECIMEN: NORMAL
WHOLE BLOOD HOLD SPECIMEN: NORMAL

## 2021-01-01 PROCEDURE — 83605 ASSAY OF LACTIC ACID: CPT | Performed by: EMERGENCY MEDICINE

## 2021-01-01 PROCEDURE — 85007 BL SMEAR W/DIFF WBC COUNT: CPT | Performed by: INTERNAL MEDICINE

## 2021-01-01 PROCEDURE — 80053 COMPREHEN METABOLIC PANEL: CPT | Performed by: INTERNAL MEDICINE

## 2021-01-01 PROCEDURE — 25010000002 LORAZEPAM PER 2 MG: Performed by: NURSE PRACTITIONER

## 2021-01-01 PROCEDURE — 87205 SMEAR GRAM STAIN: CPT | Performed by: INTERNAL MEDICINE

## 2021-01-01 PROCEDURE — 99232 SBSQ HOSP IP/OBS MODERATE 35: CPT | Performed by: INTERNAL MEDICINE

## 2021-01-01 PROCEDURE — 99232 SBSQ HOSP IP/OBS MODERATE 35: CPT | Performed by: FAMILY MEDICINE

## 2021-01-01 PROCEDURE — 86140 C-REACTIVE PROTEIN: CPT | Performed by: INTERNAL MEDICINE

## 2021-01-01 PROCEDURE — 82962 GLUCOSE BLOOD TEST: CPT

## 2021-01-01 PROCEDURE — 84145 PROCALCITONIN (PCT): CPT | Performed by: EMERGENCY MEDICINE

## 2021-01-01 PROCEDURE — 85025 COMPLETE CBC W/AUTO DIFF WBC: CPT | Performed by: INTERNAL MEDICINE

## 2021-01-01 PROCEDURE — 84132 ASSAY OF SERUM POTASSIUM: CPT | Performed by: INTERNAL MEDICINE

## 2021-01-01 PROCEDURE — 82805 BLOOD GASES W/O2 SATURATION: CPT

## 2021-01-01 PROCEDURE — 25010000002 FUROSEMIDE PER 20 MG: Performed by: NURSE PRACTITIONER

## 2021-01-01 PROCEDURE — 25010000002 HEPARIN (PORCINE) PER 1000 UNITS: Performed by: PHYSICIAN ASSISTANT

## 2021-01-01 PROCEDURE — 25010000002 MORPHINE PER 10 MG: Performed by: NURSE PRACTITIONER

## 2021-01-01 PROCEDURE — 94799 UNLISTED PULMONARY SVC/PX: CPT

## 2021-01-01 PROCEDURE — 25010000002 KETOROLAC TROMETHAMINE PER 15 MG: Performed by: INTERNAL MEDICINE

## 2021-01-01 PROCEDURE — 97161 PT EVAL LOW COMPLEX 20 MIN: CPT

## 2021-01-01 PROCEDURE — 71250 CT THORAX DX C-: CPT | Performed by: RADIOLOGY

## 2021-01-01 PROCEDURE — 36415 COLL VENOUS BLD VENIPUNCTURE: CPT

## 2021-01-01 PROCEDURE — 85025 COMPLETE CBC W/AUTO DIFF WBC: CPT | Performed by: EMERGENCY MEDICINE

## 2021-01-01 PROCEDURE — 93005 ELECTROCARDIOGRAM TRACING: CPT | Performed by: INTERNAL MEDICINE

## 2021-01-01 PROCEDURE — 0 IOPAMIDOL PER 1 ML: Performed by: INTERNAL MEDICINE

## 2021-01-01 PROCEDURE — 97167 OT EVAL HIGH COMPLEX 60 MIN: CPT

## 2021-01-01 PROCEDURE — 25010000002 KETOROLAC TROMETHAMINE PER 15 MG: Performed by: NURSE PRACTITIONER

## 2021-01-01 PROCEDURE — 85007 BL SMEAR W/DIFF WBC COUNT: CPT | Performed by: EMERGENCY MEDICINE

## 2021-01-01 PROCEDURE — 87186 SC STD MICRODIL/AGAR DIL: CPT | Performed by: NURSE PRACTITIONER

## 2021-01-01 PROCEDURE — 99291 CRITICAL CARE FIRST HOUR: CPT | Performed by: INTERNAL MEDICINE

## 2021-01-01 PROCEDURE — 25010000002 METHYLPREDNISOLONE PER 40 MG: Performed by: FAMILY MEDICINE

## 2021-01-01 PROCEDURE — 25010000002 LEVETIRACETAM IN NACL 0.82% 500 MG/100ML SOLUTION: Performed by: PHYSICIAN ASSISTANT

## 2021-01-01 PROCEDURE — 87070 CULTURE OTHR SPECIMN AEROBIC: CPT | Performed by: INTERNAL MEDICINE

## 2021-01-01 PROCEDURE — 63710000001 INSULIN REGULAR HUMAN PER 5 UNITS: Performed by: FAMILY MEDICINE

## 2021-01-01 PROCEDURE — 97165 OT EVAL LOW COMPLEX 30 MIN: CPT

## 2021-01-01 PROCEDURE — 80048 BASIC METABOLIC PNL TOTAL CA: CPT | Performed by: INTERNAL MEDICINE

## 2021-01-01 PROCEDURE — 84134 ASSAY OF PREALBUMIN: CPT

## 2021-01-01 PROCEDURE — 85025 COMPLETE CBC W/AUTO DIFF WBC: CPT | Performed by: NURSE PRACTITIONER

## 2021-01-01 PROCEDURE — 84478 ASSAY OF TRIGLYCERIDES: CPT

## 2021-01-01 PROCEDURE — 71045 X-RAY EXAM CHEST 1 VIEW: CPT

## 2021-01-01 PROCEDURE — 87635 SARS-COV-2 COVID-19 AMP PRB: CPT | Performed by: PHYSICIAN ASSISTANT

## 2021-01-01 PROCEDURE — 25010000002 MEROPENEM PER 100 MG: Performed by: PHYSICIAN ASSISTANT

## 2021-01-01 PROCEDURE — 83880 ASSAY OF NATRIURETIC PEPTIDE: CPT | Performed by: NURSE PRACTITIONER

## 2021-01-01 PROCEDURE — 84134 ASSAY OF PREALBUMIN: CPT | Performed by: INTERNAL MEDICINE

## 2021-01-01 PROCEDURE — 80053 COMPREHEN METABOLIC PANEL: CPT | Performed by: EMERGENCY MEDICINE

## 2021-01-01 PROCEDURE — 87899 AGENT NOS ASSAY W/OPTIC: CPT | Performed by: PHYSICIAN ASSISTANT

## 2021-01-01 PROCEDURE — 83036 HEMOGLOBIN GLYCOSYLATED A1C: CPT | Performed by: PHYSICIAN ASSISTANT

## 2021-01-01 PROCEDURE — 99233 SBSQ HOSP IP/OBS HIGH 50: CPT | Performed by: FAMILY MEDICINE

## 2021-01-01 PROCEDURE — 93306 TTE W/DOPPLER COMPLETE: CPT | Performed by: INTERNAL MEDICINE

## 2021-01-01 PROCEDURE — 87086 URINE CULTURE/COLONY COUNT: CPT | Performed by: INTERNAL MEDICINE

## 2021-01-01 PROCEDURE — 87070 CULTURE OTHR SPECIMN AEROBIC: CPT | Performed by: PHYSICIAN ASSISTANT

## 2021-01-01 PROCEDURE — U0003 INFECTIOUS AGENT DETECTION BY NUCLEIC ACID (DNA OR RNA); SEVERE ACUTE RESPIRATORY SYNDROME CORONAVIRUS 2 (SARS-COV-2) (CORONAVIRUS DISEASE [COVID-19]), AMPLIFIED PROBE TECHNIQUE, MAKING USE OF HIGH THROUGHPUT TECHNOLOGIES AS DESCRIBED BY CMS-2020-01-R: HCPCS | Performed by: PHYSICIAN ASSISTANT

## 2021-01-01 PROCEDURE — 82465 ASSAY BLD/SERUM CHOLESTEROL: CPT

## 2021-01-01 PROCEDURE — 85025 COMPLETE CBC W/AUTO DIFF WBC: CPT | Performed by: PHYSICIAN ASSISTANT

## 2021-01-01 PROCEDURE — 94761 N-INVAS EAR/PLS OXIMETRY MLT: CPT

## 2021-01-01 PROCEDURE — 0202U NFCT DS 22 TRGT SARS-COV-2: CPT | Performed by: EMERGENCY MEDICINE

## 2021-01-01 PROCEDURE — 87106 FUNGI IDENTIFICATION YEAST: CPT | Performed by: NURSE PRACTITIONER

## 2021-01-01 PROCEDURE — 93306 TTE W/DOPPLER COMPLETE: CPT

## 2021-01-01 PROCEDURE — 71250 CT THORAX DX C-: CPT

## 2021-01-01 PROCEDURE — 85027 COMPLETE CBC AUTOMATED: CPT | Performed by: PHYSICIAN ASSISTANT

## 2021-01-01 PROCEDURE — 93005 ELECTROCARDIOGRAM TRACING: CPT | Performed by: EMERGENCY MEDICINE

## 2021-01-01 PROCEDURE — 25010000002 LORAZEPAM PER 2 MG: Performed by: FAMILY MEDICINE

## 2021-01-01 PROCEDURE — 25010000002 VANCOMYCIN 10 G RECONSTITUTED SOLUTION

## 2021-01-01 PROCEDURE — 80170 ASSAY OF GENTAMICIN: CPT | Performed by: INTERNAL MEDICINE

## 2021-01-01 PROCEDURE — 80053 COMPREHEN METABOLIC PANEL: CPT

## 2021-01-01 PROCEDURE — 94640 AIRWAY INHALATION TREATMENT: CPT

## 2021-01-01 PROCEDURE — 87636 SARSCOV2 & INF A&B AMP PRB: CPT | Performed by: INTERNAL MEDICINE

## 2021-01-01 PROCEDURE — 25010000002 METHYLPREDNISOLONE PER 125 MG: Performed by: PHYSICIAN ASSISTANT

## 2021-01-01 PROCEDURE — 99284 EMERGENCY DEPT VISIT MOD MDM: CPT

## 2021-01-01 PROCEDURE — 86140 C-REACTIVE PROTEIN: CPT | Performed by: PHYSICIAN ASSISTANT

## 2021-01-01 PROCEDURE — 83735 ASSAY OF MAGNESIUM: CPT

## 2021-01-01 PROCEDURE — 87641 MR-STAPH DNA AMP PROBE: CPT

## 2021-01-01 PROCEDURE — 83050 HGB METHEMOGLOBIN QUAN: CPT

## 2021-01-01 PROCEDURE — 87186 SC STD MICRODIL/AGAR DIL: CPT | Performed by: INTERNAL MEDICINE

## 2021-01-01 PROCEDURE — 99223 1ST HOSP IP/OBS HIGH 75: CPT | Performed by: INTERNAL MEDICINE

## 2021-01-01 PROCEDURE — 36600 WITHDRAWAL OF ARTERIAL BLOOD: CPT

## 2021-01-01 PROCEDURE — 87070 CULTURE OTHR SPECIMN AEROBIC: CPT | Performed by: NURSE PRACTITIONER

## 2021-01-01 PROCEDURE — 71045 X-RAY EXAM CHEST 1 VIEW: CPT | Performed by: RADIOLOGY

## 2021-01-01 PROCEDURE — 83605 ASSAY OF LACTIC ACID: CPT | Performed by: NURSE PRACTITIONER

## 2021-01-01 PROCEDURE — 87205 SMEAR GRAM STAIN: CPT | Performed by: PHYSICIAN ASSISTANT

## 2021-01-01 PROCEDURE — 25010000002 MORPHINE PER 10 MG: Performed by: FAMILY MEDICINE

## 2021-01-01 PROCEDURE — 80053 COMPREHEN METABOLIC PANEL: CPT | Performed by: PHYSICIAN ASSISTANT

## 2021-01-01 PROCEDURE — 83880 ASSAY OF NATRIURETIC PEPTIDE: CPT | Performed by: EMERGENCY MEDICINE

## 2021-01-01 PROCEDURE — 25010000002 VANCOMYCIN PER 500 MG

## 2021-01-01 PROCEDURE — 25010000002 MEROPENEM PER 100 MG: Performed by: EMERGENCY MEDICINE

## 2021-01-01 PROCEDURE — 74176 CT ABD & PELVIS W/O CONTRAST: CPT

## 2021-01-01 PROCEDURE — 87040 BLOOD CULTURE FOR BACTERIA: CPT | Performed by: EMERGENCY MEDICINE

## 2021-01-01 PROCEDURE — 83735 ASSAY OF MAGNESIUM: CPT | Performed by: PHYSICIAN ASSISTANT

## 2021-01-01 PROCEDURE — 82375 ASSAY CARBOXYHB QUANT: CPT

## 2021-01-01 PROCEDURE — 84145 PROCALCITONIN (PCT): CPT | Performed by: NURSE PRACTITIONER

## 2021-01-01 PROCEDURE — 71275 CT ANGIOGRAPHY CHEST: CPT

## 2021-01-01 PROCEDURE — 87205 SMEAR GRAM STAIN: CPT | Performed by: NURSE PRACTITIONER

## 2021-01-01 PROCEDURE — 81001 URINALYSIS AUTO W/SCOPE: CPT | Performed by: INTERNAL MEDICINE

## 2021-01-01 PROCEDURE — 83735 ASSAY OF MAGNESIUM: CPT | Performed by: INTERNAL MEDICINE

## 2021-01-01 PROCEDURE — 84484 ASSAY OF TROPONIN QUANT: CPT | Performed by: EMERGENCY MEDICINE

## 2021-01-01 PROCEDURE — 63710000001 INSULIN LISPRO (HUMAN) PER 5 UNITS: Performed by: PHYSICIAN ASSISTANT

## 2021-01-01 PROCEDURE — 84100 ASSAY OF PHOSPHORUS: CPT

## 2021-01-01 PROCEDURE — 93010 ELECTROCARDIOGRAM REPORT: CPT | Performed by: INTERNAL MEDICINE

## 2021-01-01 RX ORDER — MORPHINE SULFATE 2 MG/ML
2 INJECTION, SOLUTION INTRAMUSCULAR; INTRAVENOUS EVERY 6 HOURS
Status: DISCONTINUED | OUTPATIENT
Start: 2021-01-01 | End: 2021-01-01

## 2021-01-01 RX ORDER — GLYCOPYRROLATE 1 MG/1
1 TABLET ORAL 3 TIMES DAILY
Status: DISCONTINUED | OUTPATIENT
Start: 2021-01-01 | End: 2021-01-01

## 2021-01-01 RX ORDER — SODIUM CHLORIDE 0.9 % (FLUSH) 0.9 %
10 SYRINGE (ML) INJECTION EVERY 12 HOURS SCHEDULED
Status: CANCELLED | OUTPATIENT
Start: 2021-01-01

## 2021-01-01 RX ORDER — METOPROLOL TARTRATE 50 MG/1
50 TABLET, FILM COATED ORAL EVERY 12 HOURS SCHEDULED
Status: DISCONTINUED | OUTPATIENT
Start: 2021-01-01 | End: 2021-01-01 | Stop reason: HOSPADM

## 2021-01-01 RX ORDER — FUROSEMIDE 20 MG/1
20 TABLET ORAL
Status: CANCELLED | OUTPATIENT
Start: 2021-01-01

## 2021-01-01 RX ORDER — NYSTATIN 100000 [USP'U]/G
POWDER TOPICAL EVERY 12 HOURS SCHEDULED
Status: DISCONTINUED | OUTPATIENT
Start: 2021-01-01 | End: 2022-01-01 | Stop reason: HOSPADM

## 2021-01-01 RX ORDER — L.ACID,PARA/B.BIFIDUM/S.THERM 8B CELL
1 CAPSULE ORAL DAILY
Status: DISCONTINUED | OUTPATIENT
Start: 2021-01-01 | End: 2021-01-01 | Stop reason: HOSPADM

## 2021-01-01 RX ORDER — DIPHENOXYLATE HYDROCHLORIDE AND ATROPINE SULFATE 2.5; .025 MG/1; MG/1
1 TABLET ORAL DAILY
Status: DISCONTINUED | OUTPATIENT
Start: 2021-01-01 | End: 2021-01-01 | Stop reason: HOSPADM

## 2021-01-01 RX ORDER — LORAZEPAM 2 MG/ML
1 INJECTION INTRAMUSCULAR EVERY 6 HOURS
Status: DISCONTINUED | OUTPATIENT
Start: 2021-01-01 | End: 2022-01-01

## 2021-01-01 RX ORDER — ASPIRIN 325 MG
325 TABLET ORAL DAILY
Status: DISCONTINUED | OUTPATIENT
Start: 2021-01-01 | End: 2021-01-01 | Stop reason: HOSPADM

## 2021-01-01 RX ORDER — ACETAMINOPHEN 325 MG/1
650 TABLET ORAL EVERY 4 HOURS PRN
Status: DISCONTINUED | OUTPATIENT
Start: 2021-01-01 | End: 2021-01-01

## 2021-01-01 RX ORDER — MORPHINE SULFATE 4 MG/ML
4 INJECTION, SOLUTION INTRAMUSCULAR; INTRAVENOUS EVERY 6 HOURS
Status: DISCONTINUED | OUTPATIENT
Start: 2021-01-01 | End: 2022-01-01

## 2021-01-01 RX ORDER — IPRATROPIUM BROMIDE AND ALBUTEROL SULFATE 2.5; .5 MG/3ML; MG/3ML
3 SOLUTION RESPIRATORY (INHALATION) EVERY 4 HOURS PRN
Status: DISCONTINUED | OUTPATIENT
Start: 2021-01-01 | End: 2021-01-01 | Stop reason: HOSPADM

## 2021-01-01 RX ORDER — METOPROLOL TARTRATE 50 MG/1
50 TABLET, FILM COATED ORAL EVERY 12 HOURS SCHEDULED
Status: DISCONTINUED | OUTPATIENT
Start: 2021-01-01 | End: 2021-01-01

## 2021-01-01 RX ORDER — NICOTINE POLACRILEX 4 MG
15 LOZENGE BUCCAL
Status: CANCELLED | OUTPATIENT
Start: 2021-01-01

## 2021-01-01 RX ORDER — SODIUM CHLORIDE 0.9 % (FLUSH) 0.9 %
10 SYRINGE (ML) INJECTION AS NEEDED
Status: DISCONTINUED | OUTPATIENT
Start: 2021-01-01 | End: 2021-01-01 | Stop reason: HOSPADM

## 2021-01-01 RX ORDER — METHYLPREDNISOLONE SODIUM SUCCINATE 40 MG/ML
40 INJECTION, POWDER, LYOPHILIZED, FOR SOLUTION INTRAMUSCULAR; INTRAVENOUS EVERY 12 HOURS SCHEDULED
Status: DISCONTINUED | OUTPATIENT
Start: 2021-01-01 | End: 2021-01-01 | Stop reason: HOSPADM

## 2021-01-01 RX ORDER — CHLORHEXIDINE GLUCONATE 0.12 MG/ML
15 RINSE ORAL EVERY 12 HOURS SCHEDULED
Status: DISCONTINUED | OUTPATIENT
Start: 2021-01-01 | End: 2021-01-01

## 2021-01-01 RX ORDER — GLYCOPYRROLATE 0.2 MG/ML
0.4 INJECTION INTRAMUSCULAR; INTRAVENOUS EVERY 4 HOURS PRN
Status: DISCONTINUED | OUTPATIENT
Start: 2021-01-01 | End: 2021-01-01 | Stop reason: HOSPADM

## 2021-01-01 RX ORDER — GLYCOPYRROLATE 0.2 MG/ML
0.1 INJECTION INTRAMUSCULAR; INTRAVENOUS ONCE
Status: COMPLETED | OUTPATIENT
Start: 2021-01-01 | End: 2021-01-01

## 2021-01-01 RX ORDER — DIPHENOXYLATE HYDROCHLORIDE AND ATROPINE SULFATE 2.5; .025 MG/1; MG/1
1 TABLET ORAL DAILY
Status: CANCELLED | OUTPATIENT
Start: 2021-01-01

## 2021-01-01 RX ORDER — CHOLECALCIFEROL (VITAMIN D3) 125 MCG
5 CAPSULE ORAL NIGHTLY PRN
Status: CANCELLED | OUTPATIENT
Start: 2021-01-01

## 2021-01-01 RX ORDER — CHLORHEXIDINE GLUCONATE 0.12 MG/ML
15 RINSE ORAL 2 TIMES DAILY PRN
Status: DISCONTINUED | OUTPATIENT
Start: 2021-01-01 | End: 2022-01-01 | Stop reason: HOSPADM

## 2021-01-01 RX ORDER — SCOLOPAMINE TRANSDERMAL SYSTEM 1 MG/1
1 PATCH, EXTENDED RELEASE TRANSDERMAL
Status: DISCONTINUED | OUTPATIENT
Start: 2021-01-01 | End: 2022-01-01 | Stop reason: HOSPADM

## 2021-01-01 RX ORDER — GLYCOPYRROLATE 0.2 MG/ML
0.2 INJECTION INTRAMUSCULAR; INTRAVENOUS EVERY 6 HOURS PRN
Status: DISCONTINUED | OUTPATIENT
Start: 2021-01-01 | End: 2021-01-01

## 2021-01-01 RX ORDER — FUROSEMIDE 10 MG/ML
20 INJECTION INTRAMUSCULAR; INTRAVENOUS EVERY 6 HOURS
Status: DISCONTINUED | OUTPATIENT
Start: 2021-01-01 | End: 2022-01-01 | Stop reason: HOSPADM

## 2021-01-01 RX ORDER — FUROSEMIDE 10 MG/ML
20 INJECTION INTRAMUSCULAR; INTRAVENOUS EVERY 8 HOURS
Status: DISCONTINUED | OUTPATIENT
Start: 2021-01-01 | End: 2021-01-01

## 2021-01-01 RX ORDER — METOCLOPRAMIDE HYDROCHLORIDE 5 MG/5ML
5 SOLUTION ORAL
Status: DISCONTINUED | OUTPATIENT
Start: 2021-01-01 | End: 2021-01-01

## 2021-01-01 RX ORDER — METOCLOPRAMIDE HYDROCHLORIDE 5 MG/5ML
5 SOLUTION ORAL
Status: CANCELLED | OUTPATIENT
Start: 2021-01-01

## 2021-01-01 RX ORDER — BISACODYL 10 MG
10 SUPPOSITORY, RECTAL RECTAL NIGHTLY
Status: COMPLETED | OUTPATIENT
Start: 2021-01-01 | End: 2021-01-01

## 2021-01-01 RX ORDER — LEVETIRACETAM 5 MG/ML
500 INJECTION INTRAVASCULAR EVERY 12 HOURS SCHEDULED
Status: DISCONTINUED | OUTPATIENT
Start: 2021-01-01 | End: 2021-01-01

## 2021-01-01 RX ORDER — MORPHINE SULFATE 2 MG/ML
1 INJECTION, SOLUTION INTRAMUSCULAR; INTRAVENOUS
Status: CANCELLED | OUTPATIENT
Start: 2021-01-01 | End: 2021-01-01

## 2021-01-01 RX ORDER — LEVETIRACETAM 5 MG/ML
500 INJECTION INTRAVASCULAR EVERY 12 HOURS SCHEDULED
Status: CANCELLED | OUTPATIENT
Start: 2021-01-01

## 2021-01-01 RX ORDER — POLYVINYL ALCOHOL 14 MG/ML
2 SOLUTION/ DROPS OPHTHALMIC EVERY 6 HOURS
Status: DISCONTINUED | OUTPATIENT
Start: 2021-01-01 | End: 2021-01-01

## 2021-01-01 RX ORDER — LEVETIRACETAM 5 MG/ML
500 INJECTION INTRAVASCULAR EVERY 12 HOURS SCHEDULED
Status: DISCONTINUED | OUTPATIENT
Start: 2021-01-01 | End: 2021-01-01 | Stop reason: HOSPADM

## 2021-01-01 RX ORDER — FUROSEMIDE 20 MG/1
20 TABLET ORAL
Status: DISCONTINUED | OUTPATIENT
Start: 2021-01-01 | End: 2021-01-01 | Stop reason: HOSPADM

## 2021-01-01 RX ORDER — LORAZEPAM 2 MG/ML
0.5 INJECTION INTRAMUSCULAR EVERY 4 HOURS
Status: DISCONTINUED | OUTPATIENT
Start: 2021-01-01 | End: 2021-01-01

## 2021-01-01 RX ORDER — MORPHINE SULFATE 10 MG/5ML
4 SOLUTION ORAL EVERY 4 HOURS PRN
Status: CANCELLED | OUTPATIENT
Start: 2021-01-01 | End: 2021-01-01

## 2021-01-01 RX ORDER — GLYCOPYRROLATE 0.2 MG/ML
0.4 INJECTION INTRAMUSCULAR; INTRAVENOUS EVERY 4 HOURS PRN
Status: CANCELLED | OUTPATIENT
Start: 2021-01-01

## 2021-01-01 RX ORDER — LORAZEPAM 2 MG/ML
1 INJECTION INTRAMUSCULAR EVERY 6 HOURS PRN
Status: DISCONTINUED | OUTPATIENT
Start: 2021-01-01 | End: 2021-01-01 | Stop reason: HOSPADM

## 2021-01-01 RX ORDER — ASPIRIN 325 MG
325 TABLET ORAL DAILY
Status: CANCELLED | OUTPATIENT
Start: 2021-01-01

## 2021-01-01 RX ORDER — BUDESONIDE 0.5 MG/2ML
0.5 INHALANT ORAL
Status: DISCONTINUED | OUTPATIENT
Start: 2021-01-01 | End: 2021-01-01 | Stop reason: HOSPADM

## 2021-01-01 RX ORDER — LORAZEPAM 2 MG/ML
2 INJECTION INTRAMUSCULAR
Status: DISCONTINUED | OUTPATIENT
Start: 2021-01-01 | End: 2022-01-01 | Stop reason: HOSPADM

## 2021-01-01 RX ORDER — ACETAMINOPHEN 325 MG/1
650 TABLET ORAL EVERY 4 HOURS PRN
Status: DISCONTINUED | OUTPATIENT
Start: 2021-01-01 | End: 2021-01-01 | Stop reason: HOSPADM

## 2021-01-01 RX ORDER — LORAZEPAM 2 MG/ML
0.5 INJECTION INTRAMUSCULAR EVERY 4 HOURS PRN
Status: DISCONTINUED | OUTPATIENT
Start: 2021-01-01 | End: 2021-01-01

## 2021-01-01 RX ORDER — METHYLPREDNISOLONE SODIUM SUCCINATE 125 MG/2ML
80 INJECTION, POWDER, LYOPHILIZED, FOR SOLUTION INTRAMUSCULAR; INTRAVENOUS ONCE
Status: COMPLETED | OUTPATIENT
Start: 2021-01-01 | End: 2021-01-01

## 2021-01-01 RX ORDER — CHOLECALCIFEROL (VITAMIN D3) 125 MCG
5 CAPSULE ORAL NIGHTLY PRN
Status: DISCONTINUED | OUTPATIENT
Start: 2021-01-01 | End: 2021-01-01

## 2021-01-01 RX ORDER — KETOROLAC TROMETHAMINE 30 MG/ML
15 INJECTION, SOLUTION INTRAMUSCULAR; INTRAVENOUS EVERY 6 HOURS PRN
Status: DISPENSED | OUTPATIENT
Start: 2021-01-01 | End: 2021-01-01

## 2021-01-01 RX ORDER — SODIUM CHLORIDE 450 MG/100ML
50 INJECTION, SOLUTION INTRAVENOUS CONTINUOUS
Status: DISCONTINUED | OUTPATIENT
Start: 2021-01-01 | End: 2021-01-01

## 2021-01-01 RX ORDER — DIPHENOXYLATE HYDROCHLORIDE AND ATROPINE SULFATE 2.5; .025 MG/1; MG/1
1 TABLET ORAL DAILY
Status: DISCONTINUED | OUTPATIENT
Start: 2021-01-01 | End: 2021-01-01

## 2021-01-01 RX ORDER — METOPROLOL TARTRATE 50 MG/1
50 TABLET, FILM COATED ORAL EVERY 12 HOURS SCHEDULED
Status: CANCELLED | OUTPATIENT
Start: 2021-01-01

## 2021-01-01 RX ORDER — MORPHINE SULFATE 2 MG/ML
2 INJECTION, SOLUTION INTRAMUSCULAR; INTRAVENOUS EVERY 4 HOURS
Status: DISCONTINUED | OUTPATIENT
Start: 2021-01-01 | End: 2021-01-01

## 2021-01-01 RX ORDER — L.ACID,PARA/B.BIFIDUM/S.THERM 8B CELL
1 CAPSULE ORAL DAILY
Status: CANCELLED | OUTPATIENT
Start: 2021-01-01

## 2021-01-01 RX ORDER — IPRATROPIUM BROMIDE AND ALBUTEROL SULFATE 2.5; .5 MG/3ML; MG/3ML
3 SOLUTION RESPIRATORY (INHALATION) EVERY 4 HOURS PRN
Status: CANCELLED | OUTPATIENT
Start: 2021-01-01

## 2021-01-01 RX ORDER — CHOLECALCIFEROL (VITAMIN D3) 125 MCG
5 CAPSULE ORAL NIGHTLY PRN
Status: DISCONTINUED | OUTPATIENT
Start: 2021-01-01 | End: 2021-01-01 | Stop reason: HOSPADM

## 2021-01-01 RX ORDER — DOCOSANOL 100 MG/G
1 CREAM TOPICAL
Status: DISCONTINUED | OUTPATIENT
Start: 2021-01-01 | End: 2021-01-01

## 2021-01-01 RX ORDER — MORPHINE SULFATE 4 MG/ML
4 INJECTION, SOLUTION INTRAMUSCULAR; INTRAVENOUS EVERY 4 HOURS
Status: DISCONTINUED | OUTPATIENT
Start: 2021-01-01 | End: 2021-01-01

## 2021-01-01 RX ORDER — DEXTROSE MONOHYDRATE 25 G/50ML
25 INJECTION, SOLUTION INTRAVENOUS
Status: CANCELLED | OUTPATIENT
Start: 2021-01-01

## 2021-01-01 RX ORDER — MORPHINE SULFATE 4 MG/ML
4 INJECTION, SOLUTION INTRAMUSCULAR; INTRAVENOUS
Status: DISPENSED | OUTPATIENT
Start: 2021-01-01 | End: 2022-01-01

## 2021-01-01 RX ORDER — DEXTROSE MONOHYDRATE 25 G/50ML
25 INJECTION, SOLUTION INTRAVENOUS
Status: DISCONTINUED | OUTPATIENT
Start: 2021-01-01 | End: 2021-01-01 | Stop reason: HOSPADM

## 2021-01-01 RX ORDER — LORAZEPAM 2 MG/ML
1 INJECTION INTRAMUSCULAR EVERY 4 HOURS PRN
Status: DISCONTINUED | OUTPATIENT
Start: 2021-01-01 | End: 2022-01-01 | Stop reason: HOSPADM

## 2021-01-01 RX ORDER — NICOTINE POLACRILEX 4 MG
15 LOZENGE BUCCAL
Status: DISCONTINUED | OUTPATIENT
Start: 2021-01-01 | End: 2021-01-01 | Stop reason: HOSPADM

## 2021-01-01 RX ORDER — FUROSEMIDE 10 MG/ML
20 INJECTION INTRAMUSCULAR; INTRAVENOUS EVERY 6 HOURS PRN
Status: DISCONTINUED | OUTPATIENT
Start: 2021-01-01 | End: 2022-01-01 | Stop reason: HOSPADM

## 2021-01-01 RX ORDER — ONDANSETRON 2 MG/ML
4 INJECTION INTRAMUSCULAR; INTRAVENOUS EVERY 6 HOURS PRN
Status: DISCONTINUED | OUTPATIENT
Start: 2021-01-01 | End: 2022-01-01 | Stop reason: HOSPADM

## 2021-01-01 RX ORDER — GLYCOPYRROLATE 0.2 MG/ML
0.2 INJECTION INTRAMUSCULAR; INTRAVENOUS 2 TIMES DAILY
Status: DISCONTINUED | OUTPATIENT
Start: 2021-01-01 | End: 2022-01-01 | Stop reason: HOSPADM

## 2021-01-01 RX ORDER — SODIUM CHLORIDE 0.9 % (FLUSH) 0.9 %
10 SYRINGE (ML) INJECTION AS NEEDED
Status: CANCELLED | OUTPATIENT
Start: 2021-01-01

## 2021-01-01 RX ORDER — HEPARIN SODIUM 5000 [USP'U]/ML
5000 INJECTION, SOLUTION INTRAVENOUS; SUBCUTANEOUS EVERY 12 HOURS SCHEDULED
Status: CANCELLED | OUTPATIENT
Start: 2021-01-01

## 2021-01-01 RX ORDER — ACETAMINOPHEN 650 MG/1
650 SUPPOSITORY RECTAL ONCE
Status: COMPLETED | OUTPATIENT
Start: 2021-01-01 | End: 2021-01-01

## 2021-01-01 RX ORDER — LORAZEPAM 2 MG/ML
1 INJECTION INTRAMUSCULAR EVERY 6 HOURS PRN
Status: CANCELLED | OUTPATIENT
Start: 2021-01-01 | End: 2021-01-01

## 2021-01-01 RX ORDER — AMLODIPINE BESYLATE 10 MG/1
10 TABLET ORAL DAILY
Status: DISCONTINUED | OUTPATIENT
Start: 2021-01-01 | End: 2021-01-01

## 2021-01-01 RX ORDER — SCOLOPAMINE TRANSDERMAL SYSTEM 1 MG/1
1 PATCH, EXTENDED RELEASE TRANSDERMAL
Status: DISCONTINUED | OUTPATIENT
Start: 2021-01-01 | End: 2021-01-01

## 2021-01-01 RX ORDER — VANCOMYCIN HYDROCHLORIDE 1 G/200ML
15 INJECTION, SOLUTION INTRAVENOUS EVERY 12 HOURS
Status: DISCONTINUED | OUTPATIENT
Start: 2021-01-01 | End: 2021-01-01

## 2021-01-01 RX ORDER — METOCLOPRAMIDE HYDROCHLORIDE 5 MG/5ML
5 SOLUTION ORAL
Status: DISCONTINUED | OUTPATIENT
Start: 2021-01-01 | End: 2021-01-01 | Stop reason: HOSPADM

## 2021-01-01 RX ORDER — MORPHINE SULFATE 2 MG/ML
2 INJECTION, SOLUTION INTRAMUSCULAR; INTRAVENOUS
Status: DISCONTINUED | OUTPATIENT
Start: 2021-01-01 | End: 2021-01-01

## 2021-01-01 RX ORDER — AMINO ACIDS/PROTEIN HYDROLYS 15G-100/30
30 LIQUID (ML) ORAL DAILY
Status: CANCELLED | OUTPATIENT
Start: 2021-01-01

## 2021-01-01 RX ORDER — GLYCOPYRROLATE 0.2 MG/ML
0.2 INJECTION INTRAMUSCULAR; INTRAVENOUS EVERY 4 HOURS PRN
Status: DISCONTINUED | OUTPATIENT
Start: 2021-01-01 | End: 2022-01-01 | Stop reason: HOSPADM

## 2021-01-01 RX ORDER — ASPIRIN 325 MG
325 TABLET ORAL DAILY
Status: DISCONTINUED | OUTPATIENT
Start: 2021-01-01 | End: 2021-01-01

## 2021-01-01 RX ORDER — DOCOSANOL 100 MG/G
1 CREAM TOPICAL EVERY 6 HOURS
Status: DISCONTINUED | OUTPATIENT
Start: 2021-01-01 | End: 2022-01-01

## 2021-01-01 RX ORDER — MORPHINE SULFATE 2 MG/ML
1 INJECTION, SOLUTION INTRAMUSCULAR; INTRAVENOUS
Status: DISCONTINUED | OUTPATIENT
Start: 2021-01-01 | End: 2021-01-01 | Stop reason: HOSPADM

## 2021-01-01 RX ORDER — BUDESONIDE 0.5 MG/2ML
0.5 INHALANT ORAL
Status: CANCELLED | OUTPATIENT
Start: 2021-01-01

## 2021-01-01 RX ORDER — SODIUM CHLORIDE 0.9 % (FLUSH) 0.9 %
10 SYRINGE (ML) INJECTION AS NEEDED
Status: DISCONTINUED | OUTPATIENT
Start: 2021-01-01 | End: 2021-01-01

## 2021-01-01 RX ORDER — L.ACID,PARA/B.BIFIDUM/S.THERM 8B CELL
1 CAPSULE ORAL DAILY
Status: DISCONTINUED | OUTPATIENT
Start: 2021-01-01 | End: 2021-01-01

## 2021-01-01 RX ORDER — SODIUM CHLORIDE 0.9 % (FLUSH) 0.9 %
10 SYRINGE (ML) INJECTION EVERY 12 HOURS SCHEDULED
Status: DISCONTINUED | OUTPATIENT
Start: 2021-01-01 | End: 2021-01-01 | Stop reason: HOSPADM

## 2021-01-01 RX ORDER — AMLODIPINE BESYLATE 10 MG/1
10 TABLET ORAL DAILY
Status: CANCELLED | OUTPATIENT
Start: 2021-01-01

## 2021-01-01 RX ORDER — KETOROLAC TROMETHAMINE 30 MG/ML
15 INJECTION, SOLUTION INTRAMUSCULAR; INTRAVENOUS EVERY 6 HOURS PRN
Status: DISPENSED | OUTPATIENT
Start: 2021-01-01 | End: 2022-01-01

## 2021-01-01 RX ORDER — ACETAMINOPHEN 650 MG/1
650 SUPPOSITORY RECTAL EVERY 4 HOURS PRN
Status: DISCONTINUED | OUTPATIENT
Start: 2021-01-01 | End: 2022-01-01 | Stop reason: HOSPADM

## 2021-01-01 RX ORDER — LORAZEPAM 2 MG/ML
1 INJECTION INTRAMUSCULAR EVERY 6 HOURS PRN
Status: DISCONTINUED | OUTPATIENT
Start: 2021-01-01 | End: 2021-01-01

## 2021-01-01 RX ORDER — LORAZEPAM 1 MG/1
1 TABLET ORAL EVERY 6 HOURS PRN
Status: DISCONTINUED | OUTPATIENT
Start: 2021-01-01 | End: 2021-01-01

## 2021-01-01 RX ORDER — FUROSEMIDE 20 MG/1
20 TABLET ORAL
Status: DISCONTINUED | OUTPATIENT
Start: 2021-01-01 | End: 2021-01-01

## 2021-01-01 RX ORDER — LORAZEPAM 2 MG/ML
0.5 INJECTION INTRAMUSCULAR EVERY 6 HOURS
Status: DISCONTINUED | OUTPATIENT
Start: 2021-01-01 | End: 2021-01-01

## 2021-01-01 RX ORDER — AMLODIPINE BESYLATE 10 MG/1
10 TABLET ORAL DAILY
Status: DISCONTINUED | OUTPATIENT
Start: 2021-01-01 | End: 2021-01-01 | Stop reason: HOSPADM

## 2021-01-01 RX ORDER — AMINO ACIDS/PROTEIN HYDROLYS 15G-100/30
30 LIQUID (ML) ORAL DAILY
Status: DISCONTINUED | OUTPATIENT
Start: 2021-01-01 | End: 2021-01-01 | Stop reason: HOSPADM

## 2021-01-01 RX ORDER — ACETAMINOPHEN 325 MG/1
650 TABLET ORAL EVERY 4 HOURS PRN
Status: CANCELLED | OUTPATIENT
Start: 2021-01-01

## 2021-01-01 RX ORDER — BISACODYL 10 MG
10 SUPPOSITORY, RECTAL RECTAL DAILY PRN
Status: DISCONTINUED | OUTPATIENT
Start: 2021-01-01 | End: 2022-01-01 | Stop reason: HOSPADM

## 2021-01-01 RX ORDER — METHYLPREDNISOLONE SODIUM SUCCINATE 40 MG/ML
40 INJECTION, POWDER, LYOPHILIZED, FOR SOLUTION INTRAMUSCULAR; INTRAVENOUS EVERY 12 HOURS SCHEDULED
Status: CANCELLED | OUTPATIENT
Start: 2021-01-01

## 2021-01-01 RX ORDER — HEPARIN SODIUM 5000 [USP'U]/ML
5000 INJECTION, SOLUTION INTRAVENOUS; SUBCUTANEOUS EVERY 12 HOURS SCHEDULED
Status: DISCONTINUED | OUTPATIENT
Start: 2021-01-01 | End: 2021-01-01 | Stop reason: HOSPADM

## 2021-01-01 RX ORDER — POLYVINYL ALCOHOL 14 MG/ML
2 SOLUTION/ DROPS OPHTHALMIC EVERY 6 HOURS
Status: DISCONTINUED | OUTPATIENT
Start: 2021-01-01 | End: 2022-01-01 | Stop reason: HOSPADM

## 2021-01-01 RX ORDER — GLYCOPYRROLATE 0.2 MG/ML
0.2 INJECTION INTRAMUSCULAR; INTRAVENOUS 2 TIMES DAILY
Status: DISCONTINUED | OUTPATIENT
Start: 2021-01-01 | End: 2021-01-01

## 2021-01-01 RX ORDER — MORPHINE SULFATE 10 MG/5ML
4 SOLUTION ORAL EVERY 4 HOURS PRN
Status: DISCONTINUED | OUTPATIENT
Start: 2021-01-01 | End: 2021-01-01 | Stop reason: HOSPADM

## 2021-01-01 RX ORDER — POLYVINYL ALCOHOL 14 MG/ML
2 SOLUTION/ DROPS OPHTHALMIC
Status: DISCONTINUED | OUTPATIENT
Start: 2021-01-01 | End: 2022-01-01 | Stop reason: HOSPADM

## 2021-01-01 RX ADMIN — LORAZEPAM 0.5 MG: 2 INJECTION INTRAMUSCULAR; INTRAVENOUS at 17:11

## 2021-01-01 RX ADMIN — MINERAL OIL: 1000 LIQUID ORAL at 06:01

## 2021-01-01 RX ADMIN — VANCOMYCIN HYDROCHLORIDE 1500 MG: 10 INJECTION, POWDER, LYOPHILIZED, FOR SOLUTION INTRAVENOUS at 00:59

## 2021-01-01 RX ADMIN — NYSTATIN: 100000 POWDER TOPICAL at 09:51

## 2021-01-01 RX ADMIN — MINERAL OIL: 1000 LIQUID ORAL at 02:06

## 2021-01-01 RX ADMIN — FUROSEMIDE 20 MG: 10 INJECTION INTRAMUSCULAR; INTRAVENOUS at 09:51

## 2021-01-01 RX ADMIN — MORPHINE SULFATE 2 MG: 2 INJECTION, SOLUTION INTRAMUSCULAR; INTRAVENOUS at 09:28

## 2021-01-01 RX ADMIN — MORPHINE SULFATE 2 MG: 2 INJECTION, SOLUTION INTRAMUSCULAR; INTRAVENOUS at 13:22

## 2021-01-01 RX ADMIN — BISACODYL 10 MG: 10 SUPPOSITORY RECTAL at 22:00

## 2021-01-01 RX ADMIN — LORAZEPAM 1 MG: 2 INJECTION INTRAMUSCULAR; INTRAVENOUS at 05:19

## 2021-01-01 RX ADMIN — METHYLPREDNISOLONE SODIUM SUCCINATE 40 MG: 40 INJECTION, POWDER, FOR SOLUTION INTRAMUSCULAR; INTRAVENOUS at 13:09

## 2021-01-01 RX ADMIN — LORAZEPAM 0.5 MG: 2 INJECTION INTRAMUSCULAR; INTRAVENOUS at 10:28

## 2021-01-01 RX ADMIN — SCOPALAMINE 1 PATCH: 1 PATCH, EXTENDED RELEASE TRANSDERMAL at 17:00

## 2021-01-01 RX ADMIN — MINERAL OIL: 1000 LIQUID ORAL at 14:00

## 2021-01-01 RX ADMIN — Medication: at 11:24

## 2021-01-01 RX ADMIN — POLYVINYL ALCOHOL 2 DROP: 14 SOLUTION/ DROPS OPHTHALMIC at 13:41

## 2021-01-01 RX ADMIN — MEROPENEM 1 G: 1 INJECTION, POWDER, FOR SOLUTION INTRAVENOUS at 21:34

## 2021-01-01 RX ADMIN — MEROPENEM 1 G: 1 INJECTION, POWDER, FOR SOLUTION INTRAVENOUS at 21:47

## 2021-01-01 RX ADMIN — DOCOSANOL 1 APPLICATION: 100 CREAM TOPICAL at 01:14

## 2021-01-01 RX ADMIN — FUROSEMIDE 20 MG: 10 INJECTION INTRAMUSCULAR; INTRAVENOUS at 01:14

## 2021-01-01 RX ADMIN — LORAZEPAM 0.5 MG: 2 INJECTION INTRAMUSCULAR; INTRAVENOUS at 14:06

## 2021-01-01 RX ADMIN — KETOROLAC TROMETHAMINE 15 MG: 30 INJECTION, SOLUTION INTRAMUSCULAR at 19:51

## 2021-01-01 RX ADMIN — POLYVINYL ALCOHOL 2 DROP: 14 SOLUTION/ DROPS OPHTHALMIC at 04:00

## 2021-01-01 RX ADMIN — INSULIN LISPRO 3 UNITS: 100 INJECTION, SOLUTION INTRAVENOUS; SUBCUTANEOUS at 18:05

## 2021-01-01 RX ADMIN — LORAZEPAM 1 MG: 2 INJECTION INTRAMUSCULAR; INTRAVENOUS at 13:38

## 2021-01-01 RX ADMIN — GLYCOPYRROLATE 0.4 MG: 0.2 INJECTION INTRAMUSCULAR; INTRAVENOUS at 11:59

## 2021-01-01 RX ADMIN — METOCLOPRAMIDE HYDROCHLORIDE 5 MG: 5 SOLUTION ORAL at 21:55

## 2021-01-01 RX ADMIN — MORPHINE SULFATE 1 MG: 2 INJECTION, SOLUTION INTRAMUSCULAR; INTRAVENOUS at 04:46

## 2021-01-01 RX ADMIN — MINERAL OIL: 1000 LIQUID ORAL at 02:23

## 2021-01-01 RX ADMIN — MORPHINE SULFATE 2 MG: 2 INJECTION, SOLUTION INTRAMUSCULAR; INTRAVENOUS at 10:19

## 2021-01-01 RX ADMIN — LORAZEPAM 0.5 MG: 2 INJECTION INTRAMUSCULAR; INTRAVENOUS at 14:08

## 2021-01-01 RX ADMIN — MINERAL OIL: 1000 LIQUID ORAL at 14:07

## 2021-01-01 RX ADMIN — LORAZEPAM 1 MG: 2 INJECTION INTRAMUSCULAR; INTRAVENOUS at 11:59

## 2021-01-01 RX ADMIN — GLYCOPYRROLATE 0.4 MG: 0.2 INJECTION INTRAMUSCULAR; INTRAVENOUS at 02:07

## 2021-01-01 RX ADMIN — MORPHINE SULFATE 2 MG: 2 INJECTION, SOLUTION INTRAMUSCULAR; INTRAVENOUS at 18:17

## 2021-01-01 RX ADMIN — KETOROLAC TROMETHAMINE 15 MG: 30 INJECTION, SOLUTION INTRAMUSCULAR at 01:42

## 2021-01-01 RX ADMIN — LORAZEPAM 0.5 MG: 2 INJECTION INTRAMUSCULAR; INTRAVENOUS at 10:19

## 2021-01-01 RX ADMIN — MORPHINE SULFATE 2 MG: 2 INJECTION, SOLUTION INTRAMUSCULAR; INTRAVENOUS at 02:24

## 2021-01-01 RX ADMIN — MORPHINE SULFATE 4 MG: 4 INJECTION, SOLUTION INTRAMUSCULAR; INTRAVENOUS at 08:36

## 2021-01-01 RX ADMIN — MORPHINE SULFATE 4 MG: 4 INJECTION, SOLUTION INTRAMUSCULAR; INTRAVENOUS at 01:42

## 2021-01-01 RX ADMIN — MORPHINE SULFATE 2 MG: 2 INJECTION, SOLUTION INTRAMUSCULAR; INTRAVENOUS at 13:49

## 2021-01-01 RX ADMIN — MINERAL OIL: 1000 LIQUID ORAL at 05:50

## 2021-01-01 RX ADMIN — METOCLOPRAMIDE HYDROCHLORIDE 5 MG: 5 SOLUTION ORAL at 18:24

## 2021-01-01 RX ADMIN — LORAZEPAM 1 MG: 2 INJECTION INTRAMUSCULAR; INTRAVENOUS at 01:15

## 2021-01-01 RX ADMIN — FUROSEMIDE 20 MG: 10 INJECTION INTRAMUSCULAR; INTRAVENOUS at 09:30

## 2021-01-01 RX ADMIN — Medication 1 CAPSULE: at 09:22

## 2021-01-01 RX ADMIN — LORAZEPAM 0.5 MG: 2 INJECTION INTRAMUSCULAR; INTRAVENOUS at 05:50

## 2021-01-01 RX ADMIN — LORAZEPAM 0.5 MG: 2 INJECTION INTRAMUSCULAR; INTRAVENOUS at 21:58

## 2021-01-01 RX ADMIN — LORAZEPAM 0.5 MG: 2 INJECTION INTRAMUSCULAR; INTRAVENOUS at 02:06

## 2021-01-01 RX ADMIN — POLYVINYL ALCOHOL 2 DROP: 14 SOLUTION/ DROPS OPHTHALMIC at 16:28

## 2021-01-01 RX ADMIN — METOCLOPRAMIDE HYDROCHLORIDE 5 MG: 5 SOLUTION ORAL at 13:04

## 2021-01-01 RX ADMIN — MORPHINE SULFATE 2 MG: 2 INJECTION, SOLUTION INTRAMUSCULAR; INTRAVENOUS at 09:04

## 2021-01-01 RX ADMIN — FUROSEMIDE 20 MG: 10 INJECTION INTRAMUSCULAR; INTRAVENOUS at 08:14

## 2021-01-01 RX ADMIN — LORAZEPAM 1 MG: 2 INJECTION INTRAMUSCULAR; INTRAVENOUS at 13:45

## 2021-01-01 RX ADMIN — MINERAL OIL: 1000 LIQUID ORAL at 07:38

## 2021-01-01 RX ADMIN — LORAZEPAM 0.5 MG: 2 INJECTION INTRAMUSCULAR; INTRAVENOUS at 02:24

## 2021-01-01 RX ADMIN — LORAZEPAM 1 MG: 2 INJECTION INTRAMUSCULAR; INTRAVENOUS at 08:14

## 2021-01-01 RX ADMIN — METHYLPREDNISOLONE SODIUM SUCCINATE 40 MG: 40 INJECTION, POWDER, FOR SOLUTION INTRAMUSCULAR; INTRAVENOUS at 20:37

## 2021-01-01 RX ADMIN — METOPROLOL TARTRATE 50 MG: 50 TABLET, FILM COATED ORAL at 09:22

## 2021-01-01 RX ADMIN — SODIUM CHLORIDE, PRESERVATIVE FREE 10 ML: 5 INJECTION INTRAVENOUS at 21:35

## 2021-01-01 RX ADMIN — LORAZEPAM 0.5 MG: 2 INJECTION INTRAMUSCULAR; INTRAVENOUS at 13:22

## 2021-01-01 RX ADMIN — MINERAL OIL: 1000 LIQUID ORAL at 01:53

## 2021-01-01 RX ADMIN — MORPHINE SULFATE 4 MG: 4 INJECTION, SOLUTION INTRAMUSCULAR; INTRAVENOUS at 16:13

## 2021-01-01 RX ADMIN — MEROPENEM 1 G: 1 INJECTION, POWDER, FOR SOLUTION INTRAVENOUS at 13:09

## 2021-01-01 RX ADMIN — POLYVINYL ALCOHOL 2 DROP: 14 SOLUTION/ DROPS OPHTHALMIC at 22:15

## 2021-01-01 RX ADMIN — MINERAL OIL: 1000 LIQUID ORAL at 08:14

## 2021-01-01 RX ADMIN — MEROPENEM 1 G: 1 INJECTION, POWDER, FOR SOLUTION INTRAVENOUS at 14:51

## 2021-01-01 RX ADMIN — NYSTATIN: 100000 POWDER TOPICAL at 21:57

## 2021-01-01 RX ADMIN — LORAZEPAM 1 MG: 2 INJECTION INTRAMUSCULAR; INTRAVENOUS at 07:37

## 2021-01-01 RX ADMIN — METOPROLOL TARTRATE 50 MG: 50 TABLET, FILM COATED ORAL at 21:55

## 2021-01-01 RX ADMIN — MINERAL OIL: 1000 LIQUID ORAL at 22:15

## 2021-01-01 RX ADMIN — POLYVINYL ALCOHOL 2 DROP: 14 SOLUTION/ DROPS OPHTHALMIC at 03:47

## 2021-01-01 RX ADMIN — Medication: at 21:19

## 2021-01-01 RX ADMIN — POLYVINYL ALCOHOL 2 DROP: 14 SOLUTION/ DROPS OPHTHALMIC at 01:14

## 2021-01-01 RX ADMIN — LEVETIRACETAM 500 MG: 5 INJECTION INTRAVASCULAR at 05:19

## 2021-01-01 RX ADMIN — GLYCOPYRROLATE 0.2 MG: 0.2 INJECTION INTRAMUSCULAR; INTRAVENOUS at 08:24

## 2021-01-01 RX ADMIN — MINERAL OIL: 1000 LIQUID ORAL at 05:47

## 2021-01-01 RX ADMIN — MORPHINE SULFATE 2 MG: 2 INJECTION, SOLUTION INTRAMUSCULAR; INTRAVENOUS at 14:02

## 2021-01-01 RX ADMIN — GLYCOPYRROLATE 0.2 MG: 0.2 INJECTION INTRAMUSCULAR; INTRAVENOUS at 13:45

## 2021-01-01 RX ADMIN — LORAZEPAM 0.5 MG: 2 INJECTION INTRAMUSCULAR; INTRAVENOUS at 18:16

## 2021-01-01 RX ADMIN — MORPHINE SULFATE 1 MG: 2 INJECTION, SOLUTION INTRAMUSCULAR; INTRAVENOUS at 20:17

## 2021-01-01 RX ADMIN — FUROSEMIDE 20 MG: 10 INJECTION INTRAMUSCULAR; INTRAVENOUS at 02:06

## 2021-01-01 RX ADMIN — LORAZEPAM 0.5 MG: 2 INJECTION INTRAMUSCULAR; INTRAVENOUS at 21:47

## 2021-01-01 RX ADMIN — LORAZEPAM 0.5 MG: 2 INJECTION INTRAMUSCULAR; INTRAVENOUS at 09:28

## 2021-01-01 RX ADMIN — MORPHINE SULFATE 4 MG: 4 INJECTION, SOLUTION INTRAMUSCULAR; INTRAVENOUS at 22:24

## 2021-01-01 RX ADMIN — POLYVINYL ALCOHOL 2 DROP: 14 SOLUTION/ DROPS OPHTHALMIC at 21:00

## 2021-01-01 RX ADMIN — POLYVINYL ALCOHOL 2 DROP: 14 SOLUTION/ DROPS OPHTHALMIC at 16:18

## 2021-01-01 RX ADMIN — FUROSEMIDE 20 MG: 10 INJECTION INTRAMUSCULAR; INTRAVENOUS at 17:32

## 2021-01-01 RX ADMIN — LORAZEPAM 1 MG: 2 INJECTION INTRAMUSCULAR; INTRAVENOUS at 16:13

## 2021-01-01 RX ADMIN — GUAIFENESIN 200 MG: 200 SOLUTION ORAL at 21:34

## 2021-01-01 RX ADMIN — DOCOSANOL 1 APPLICATION: 100 CREAM TOPICAL at 18:14

## 2021-01-01 RX ADMIN — HEPARIN SODIUM 5000 UNITS: 5000 INJECTION INTRAVENOUS; SUBCUTANEOUS at 09:21

## 2021-01-01 RX ADMIN — LORAZEPAM 0.5 MG: 2 INJECTION INTRAMUSCULAR; INTRAVENOUS at 03:26

## 2021-01-01 RX ADMIN — LORAZEPAM 0.5 MG: 2 INJECTION INTRAMUSCULAR; INTRAVENOUS at 14:02

## 2021-01-01 RX ADMIN — HEPARIN SODIUM 5000 UNITS: 5000 INJECTION INTRAVENOUS; SUBCUTANEOUS at 20:34

## 2021-01-01 RX ADMIN — MORPHINE SULFATE 4 MG: 4 INJECTION, SOLUTION INTRAMUSCULAR; INTRAVENOUS at 01:15

## 2021-01-01 RX ADMIN — MINERAL OIL: 1000 LIQUID ORAL at 21:12

## 2021-01-01 RX ADMIN — MULTIVITAMIN TABLET 1 TABLET: TABLET at 18:04

## 2021-01-01 RX ADMIN — Medication: at 10:35

## 2021-01-01 RX ADMIN — MINERAL OIL: 1000 LIQUID ORAL at 13:50

## 2021-01-01 RX ADMIN — MORPHINE SULFATE 2 MG: 2 INJECTION, SOLUTION INTRAMUSCULAR; INTRAVENOUS at 22:15

## 2021-01-01 RX ADMIN — MORPHINE SULFATE 4 MG: 4 INJECTION, SOLUTION INTRAMUSCULAR; INTRAVENOUS at 08:14

## 2021-01-01 RX ADMIN — LORAZEPAM 0.5 MG: 2 INJECTION INTRAMUSCULAR; INTRAVENOUS at 13:49

## 2021-01-01 RX ADMIN — Medication: at 22:53

## 2021-01-01 RX ADMIN — POLYVINYL ALCOHOL 2 DROP: 14 SOLUTION/ DROPS OPHTHALMIC at 08:15

## 2021-01-01 RX ADMIN — FUROSEMIDE 20 MG: 10 INJECTION INTRAMUSCULAR; INTRAVENOUS at 18:16

## 2021-01-01 RX ADMIN — FUROSEMIDE 20 MG: 10 INJECTION INTRAMUSCULAR; INTRAVENOUS at 10:19

## 2021-01-01 RX ADMIN — VANCOMYCIN HYDROCHLORIDE 1000 MG: 1 INJECTION, SOLUTION INTRAVENOUS at 15:48

## 2021-01-01 RX ADMIN — MORPHINE SULFATE 2 MG: 2 INJECTION, SOLUTION INTRAMUSCULAR; INTRAVENOUS at 09:40

## 2021-01-01 RX ADMIN — MINERAL OIL: 1000 LIQUID ORAL at 19:42

## 2021-01-01 RX ADMIN — FUROSEMIDE 20 MG: 10 INJECTION INTRAMUSCULAR; INTRAVENOUS at 10:34

## 2021-01-01 RX ADMIN — POLYVINYL ALCOHOL 2 DROP: 14 SOLUTION/ DROPS OPHTHALMIC at 09:39

## 2021-01-01 RX ADMIN — MORPHINE SULFATE 4 MG: 4 INJECTION, SOLUTION INTRAMUSCULAR; INTRAVENOUS at 20:27

## 2021-01-01 RX ADMIN — POLYVINYL ALCOHOL 2 DROP: 14 SOLUTION/ DROPS OPHTHALMIC at 20:30

## 2021-01-01 RX ADMIN — HEPARIN SODIUM 5000 UNITS: 5000 INJECTION INTRAVENOUS; SUBCUTANEOUS at 08:26

## 2021-01-01 RX ADMIN — HEPARIN SODIUM 5000 UNITS: 5000 INJECTION INTRAVENOUS; SUBCUTANEOUS at 12:54

## 2021-01-01 RX ADMIN — FUROSEMIDE 20 MG: 10 INJECTION INTRAMUSCULAR; INTRAVENOUS at 01:53

## 2021-01-01 RX ADMIN — HEPARIN SODIUM 5000 UNITS: 5000 INJECTION INTRAVENOUS; SUBCUTANEOUS at 21:55

## 2021-01-01 RX ADMIN — MINERAL OIL: 1000 LIQUID ORAL at 09:40

## 2021-01-01 RX ADMIN — HEPARIN SODIUM 5000 UNITS: 5000 INJECTION INTRAVENOUS; SUBCUTANEOUS at 21:31

## 2021-01-01 RX ADMIN — NYSTATIN: 100000 POWDER TOPICAL at 08:15

## 2021-01-01 RX ADMIN — MORPHINE SULFATE 2 MG: 2 INJECTION, SOLUTION INTRAMUSCULAR; INTRAVENOUS at 17:32

## 2021-01-01 RX ADMIN — MORPHINE SULFATE 1 MG: 2 INJECTION, SOLUTION INTRAMUSCULAR; INTRAVENOUS at 06:41

## 2021-01-01 RX ADMIN — MORPHINE SULFATE 4 MG: 4 INJECTION, SOLUTION INTRAMUSCULAR; INTRAVENOUS at 19:42

## 2021-01-01 RX ADMIN — MINERAL OIL: 1000 LIQUID ORAL at 20:26

## 2021-01-01 RX ADMIN — POLYVINYL ALCOHOL 2 DROP: 14 SOLUTION/ DROPS OPHTHALMIC at 04:18

## 2021-01-01 RX ADMIN — KETOROLAC TROMETHAMINE 15 MG: 30 INJECTION, SOLUTION INTRAMUSCULAR; INTRAVENOUS at 16:42

## 2021-01-01 RX ADMIN — LORAZEPAM 1 MG: 2 INJECTION INTRAMUSCULAR; INTRAVENOUS at 06:41

## 2021-01-01 RX ADMIN — DOCOSANOL 1 APPLICATION: 100 CREAM TOPICAL at 11:05

## 2021-01-01 RX ADMIN — LORAZEPAM 1 MG: 2 INJECTION INTRAMUSCULAR; INTRAVENOUS at 04:27

## 2021-01-01 RX ADMIN — IOPAMIDOL 100 ML: 755 INJECTION, SOLUTION INTRAVENOUS at 23:54

## 2021-01-01 RX ADMIN — MEROPENEM 1 G: 1 INJECTION, POWDER, FOR SOLUTION INTRAVENOUS at 21:43

## 2021-01-01 RX ADMIN — MULTIVITAMIN TABLET 1 TABLET: TABLET at 09:22

## 2021-01-01 RX ADMIN — GLYCOPYRROLATE 0.2 MG: 0.2 INJECTION INTRAMUSCULAR; INTRAVENOUS at 08:36

## 2021-01-01 RX ADMIN — DOCOSANOL 1 APPLICATION: 100 CREAM TOPICAL at 06:14

## 2021-01-01 RX ADMIN — MEROPENEM 1 G: 1 INJECTION, POWDER, FOR SOLUTION INTRAVENOUS at 06:05

## 2021-01-01 RX ADMIN — MORPHINE SULFATE 2 MG: 2 INJECTION, SOLUTION INTRAMUSCULAR; INTRAVENOUS at 05:20

## 2021-01-01 RX ADMIN — MORPHINE SULFATE 2 MG: 2 INJECTION, SOLUTION INTRAMUSCULAR; INTRAVENOUS at 05:29

## 2021-01-01 RX ADMIN — Medication: at 11:06

## 2021-01-01 RX ADMIN — LORAZEPAM 0.5 MG: 2 INJECTION INTRAMUSCULAR; INTRAVENOUS at 21:12

## 2021-01-01 RX ADMIN — MINERAL OIL: 1000 LIQUID ORAL at 22:38

## 2021-01-01 RX ADMIN — DOCOSANOL 1 APPLICATION: 100 CREAM TOPICAL at 06:13

## 2021-01-01 RX ADMIN — MORPHINE SULFATE 2 MG: 2 INJECTION, SOLUTION INTRAMUSCULAR; INTRAVENOUS at 02:06

## 2021-01-01 RX ADMIN — MEROPENEM 1 G: 1 INJECTION, POWDER, FOR SOLUTION INTRAVENOUS at 06:10

## 2021-01-01 RX ADMIN — POLYVINYL ALCOHOL 2 DROP: 14 SOLUTION/ DROPS OPHTHALMIC at 10:34

## 2021-01-01 RX ADMIN — LORAZEPAM 0.5 MG: 2 INJECTION INTRAMUSCULAR; INTRAVENOUS at 09:51

## 2021-01-01 RX ADMIN — MINERAL OIL: 1000 LIQUID ORAL at 17:55

## 2021-01-01 RX ADMIN — MORPHINE SULFATE 2 MG: 2 INJECTION, SOLUTION INTRAMUSCULAR; INTRAVENOUS at 18:14

## 2021-01-01 RX ADMIN — LORAZEPAM 0.5 MG: 2 INJECTION INTRAMUSCULAR; INTRAVENOUS at 04:03

## 2021-01-01 RX ADMIN — MORPHINE SULFATE 2 MG: 2 INJECTION, SOLUTION INTRAMUSCULAR; INTRAVENOUS at 22:07

## 2021-01-01 RX ADMIN — LORAZEPAM 0.5 MG: 2 INJECTION INTRAMUSCULAR; INTRAVENOUS at 17:55

## 2021-01-01 RX ADMIN — POLYVINYL ALCOHOL 2 DROP: 14 SOLUTION/ DROPS OPHTHALMIC at 22:08

## 2021-01-01 RX ADMIN — INSULIN HUMAN 3 UNITS: 100 INJECTION, SOLUTION PARENTERAL at 21:57

## 2021-01-01 RX ADMIN — Medication: at 21:00

## 2021-01-01 RX ADMIN — MORPHINE SULFATE 1 MG: 2 INJECTION, SOLUTION INTRAMUSCULAR; INTRAVENOUS at 10:50

## 2021-01-01 RX ADMIN — POLYVINYL ALCOHOL 2 DROP: 14 SOLUTION/ DROPS OPHTHALMIC at 21:48

## 2021-01-01 RX ADMIN — LORAZEPAM 0.5 MG: 2 INJECTION INTRAMUSCULAR; INTRAVENOUS at 22:08

## 2021-01-01 RX ADMIN — MINERAL OIL: 1000 LIQUID ORAL at 05:41

## 2021-01-01 RX ADMIN — LORAZEPAM 1 MG: 2 INJECTION INTRAMUSCULAR; INTRAVENOUS at 22:38

## 2021-01-01 RX ADMIN — GLYCOPYRROLATE 0.2 MG: 0.2 INJECTION INTRAMUSCULAR; INTRAVENOUS at 19:58

## 2021-01-01 RX ADMIN — FUROSEMIDE 20 MG: 20 TABLET ORAL at 09:22

## 2021-01-01 RX ADMIN — BISACODYL 10 MG: 10 SUPPOSITORY RECTAL at 22:38

## 2021-01-01 RX ADMIN — LORAZEPAM 0.5 MG: 2 INJECTION INTRAMUSCULAR; INTRAVENOUS at 09:30

## 2021-01-01 RX ADMIN — GLYCOPYRROLATE 0.2 MG: 0.2 INJECTION INTRAMUSCULAR; INTRAVENOUS at 16:26

## 2021-01-01 RX ADMIN — MINERAL OIL: 1000 LIQUID ORAL at 21:58

## 2021-01-01 RX ADMIN — FUROSEMIDE 20 MG: 10 INJECTION INTRAMUSCULAR; INTRAVENOUS at 07:37

## 2021-01-01 RX ADMIN — MINERAL OIL: 1000 LIQUID ORAL at 01:44

## 2021-01-01 RX ADMIN — DOCOSANOL 1 APPLICATION: 100 CREAM TOPICAL at 13:40

## 2021-01-01 RX ADMIN — Medication 30 ML: at 09:24

## 2021-01-01 RX ADMIN — DOCOSANOL 1 APPLICATION: 100 CREAM TOPICAL at 07:38

## 2021-01-01 RX ADMIN — POLYVINYL ALCOHOL 2 DROP: 14 SOLUTION/ DROPS OPHTHALMIC at 10:18

## 2021-01-01 RX ADMIN — MORPHINE SULFATE 2 MG: 2 INJECTION, SOLUTION INTRAMUSCULAR; INTRAVENOUS at 10:28

## 2021-01-01 RX ADMIN — MORPHINE SULFATE 2 MG: 2 INJECTION, SOLUTION INTRAMUSCULAR; INTRAVENOUS at 10:34

## 2021-01-01 RX ADMIN — GLYCOPYRROLATE 1 MG: 1 TABLET ORAL at 22:00

## 2021-01-01 RX ADMIN — MORPHINE SULFATE 2 MG: 2 INJECTION, SOLUTION INTRAMUSCULAR; INTRAVENOUS at 04:03

## 2021-01-01 RX ADMIN — GLYCOPYRROLATE 0.2 MG: 0.2 INJECTION INTRAMUSCULAR; INTRAVENOUS at 20:51

## 2021-01-01 RX ADMIN — AMLODIPINE BESYLATE 10 MG: 10 TABLET ORAL at 09:22

## 2021-01-01 RX ADMIN — KETOROLAC TROMETHAMINE 15 MG: 30 INJECTION, SOLUTION INTRAMUSCULAR; INTRAVENOUS at 15:12

## 2021-01-01 RX ADMIN — METHYLPREDNISOLONE SODIUM SUCCINATE 40 MG: 40 INJECTION, POWDER, FOR SOLUTION INTRAMUSCULAR; INTRAVENOUS at 20:17

## 2021-01-01 RX ADMIN — GLYCOPYRROLATE 0.2 MG: 0.2 INJECTION INTRAMUSCULAR; INTRAVENOUS at 19:42

## 2021-01-01 RX ADMIN — MORPHINE SULFATE 4 MG: 4 INJECTION, SOLUTION INTRAMUSCULAR; INTRAVENOUS at 14:26

## 2021-01-01 RX ADMIN — SODIUM CHLORIDE, PRESERVATIVE FREE 10 ML: 5 INJECTION INTRAVENOUS at 20:18

## 2021-01-01 RX ADMIN — BUDESONIDE 0.5 MG: 0.5 INHALANT RESPIRATORY (INHALATION) at 07:25

## 2021-01-01 RX ADMIN — POLYVINYL ALCOHOL 2 DROP: 14 SOLUTION/ DROPS OPHTHALMIC at 21:26

## 2021-01-01 RX ADMIN — INSULIN HUMAN 4 UNITS: 100 INJECTION, SOLUTION PARENTERAL at 06:10

## 2021-01-01 RX ADMIN — FUROSEMIDE 20 MG: 20 TABLET ORAL at 17:59

## 2021-01-01 RX ADMIN — MORPHINE SULFATE 4 MG: 4 INJECTION, SOLUTION INTRAMUSCULAR; INTRAVENOUS at 13:38

## 2021-01-01 RX ADMIN — MORPHINE SULFATE 2 MG: 2 INJECTION, SOLUTION INTRAMUSCULAR; INTRAVENOUS at 22:38

## 2021-01-01 RX ADMIN — LORAZEPAM 0.5 MG: 2 INJECTION INTRAMUSCULAR; INTRAVENOUS at 05:29

## 2021-01-01 RX ADMIN — POLYVINYL ALCOHOL 2 DROP: 14 SOLUTION/ DROPS OPHTHALMIC at 19:42

## 2021-01-01 RX ADMIN — METHYLPREDNISOLONE SODIUM SUCCINATE 80 MG: 125 INJECTION, POWDER, FOR SOLUTION INTRAMUSCULAR; INTRAVENOUS at 00:54

## 2021-01-01 RX ADMIN — MORPHINE SULFATE 2 MG: 2 INJECTION, SOLUTION INTRAMUSCULAR; INTRAVENOUS at 06:01

## 2021-01-01 RX ADMIN — MINERAL OIL: 1000 LIQUID ORAL at 05:29

## 2021-01-01 RX ADMIN — FUROSEMIDE 20 MG: 10 INJECTION INTRAMUSCULAR; INTRAVENOUS at 20:27

## 2021-01-01 RX ADMIN — LORAZEPAM 1 MG: 2 INJECTION INTRAMUSCULAR; INTRAVENOUS at 17:08

## 2021-01-01 RX ADMIN — KETOROLAC TROMETHAMINE 15 MG: 30 INJECTION, SOLUTION INTRAMUSCULAR; INTRAVENOUS at 13:38

## 2021-01-01 RX ADMIN — BUDESONIDE 0.5 MG: 0.5 INHALANT RESPIRATORY (INHALATION) at 12:12

## 2021-01-01 RX ADMIN — LEVETIRACETAM 500 MG: 5 INJECTION INTRAVASCULAR at 21:25

## 2021-01-01 RX ADMIN — Medication: at 09:49

## 2021-01-01 RX ADMIN — BISACODYL SUPPOSITORY 10 MG: 10 SUPPOSITORY RECTAL at 19:56

## 2021-01-01 RX ADMIN — INSULIN HUMAN 3 UNITS: 100 INJECTION, SOLUTION PARENTERAL at 09:21

## 2021-01-01 RX ADMIN — MINERAL OIL: 1000 LIQUID ORAL at 21:47

## 2021-01-01 RX ADMIN — MORPHINE SULFATE 2 MG: 2 INJECTION, SOLUTION INTRAMUSCULAR; INTRAVENOUS at 05:50

## 2021-01-01 RX ADMIN — MORPHINE SULFATE 2 MG: 2 INJECTION, SOLUTION INTRAMUSCULAR; INTRAVENOUS at 21:47

## 2021-01-01 RX ADMIN — FUROSEMIDE 20 MG: 10 INJECTION INTRAMUSCULAR; INTRAVENOUS at 22:37

## 2021-01-01 RX ADMIN — POLYVINYL ALCOHOL 2 DROP: 14 SOLUTION/ DROPS OPHTHALMIC at 04:05

## 2021-01-01 RX ADMIN — MORPHINE SULFATE 4 MG: 4 INJECTION, SOLUTION INTRAMUSCULAR; INTRAVENOUS at 20:31

## 2021-01-01 RX ADMIN — LORAZEPAM 0.5 MG: 2 INJECTION INTRAMUSCULAR; INTRAVENOUS at 01:24

## 2021-01-01 RX ADMIN — MINERAL OIL: 1000 LIQUID ORAL at 14:26

## 2021-01-01 RX ADMIN — LORAZEPAM 0.5 MG: 2 INJECTION INTRAMUSCULAR; INTRAVENOUS at 17:30

## 2021-01-01 RX ADMIN — MINERAL OIL: 1000 LIQUID ORAL at 09:39

## 2021-01-01 RX ADMIN — DOCOSANOL 1 APPLICATION: 100 CREAM TOPICAL at 21:12

## 2021-01-01 RX ADMIN — MORPHINE SULFATE 2 MG: 2 INJECTION, SOLUTION INTRAMUSCULAR; INTRAVENOUS at 14:08

## 2021-01-01 RX ADMIN — DOCOSANOL 1 APPLICATION: 100 CREAM TOPICAL at 14:25

## 2021-01-01 RX ADMIN — MORPHINE SULFATE 2 MG: 2 INJECTION, SOLUTION INTRAMUSCULAR; INTRAVENOUS at 09:51

## 2021-01-01 RX ADMIN — LEVETIRACETAM 500 MG: 5 INJECTION INTRAVASCULAR at 08:25

## 2021-01-01 RX ADMIN — LORAZEPAM 0.5 MG: 2 INJECTION INTRAMUSCULAR; INTRAVENOUS at 05:10

## 2021-01-01 RX ADMIN — LORAZEPAM 1 MG: 2 INJECTION INTRAMUSCULAR; INTRAVENOUS at 19:57

## 2021-01-01 RX ADMIN — Medication: at 10:18

## 2021-01-01 RX ADMIN — POLYVINYL ALCOHOL 2 DROP: 14 SOLUTION/ DROPS OPHTHALMIC at 17:00

## 2021-01-01 RX ADMIN — LORAZEPAM 0.5 MG: 2 INJECTION INTRAMUSCULAR; INTRAVENOUS at 22:15

## 2021-01-01 RX ADMIN — POLYVINYL ALCOHOL 2 DROP: 14 SOLUTION/ DROPS OPHTHALMIC at 15:17

## 2021-01-01 RX ADMIN — FUROSEMIDE 20 MG: 10 INJECTION INTRAMUSCULAR; INTRAVENOUS at 14:25

## 2021-01-01 RX ADMIN — MINERAL OIL: 1000 LIQUID ORAL at 18:14

## 2021-01-01 RX ADMIN — GLYCOPYRROLATE 0.2 MG: 0.2 INJECTION INTRAMUSCULAR; INTRAVENOUS at 07:50

## 2021-01-01 RX ADMIN — Medication: at 21:12

## 2021-01-01 RX ADMIN — INSULIN HUMAN 2 UNITS: 100 INJECTION, SOLUTION PARENTERAL at 13:09

## 2021-01-01 RX ADMIN — DOCOSANOL 1 APPLICATION: 100 CREAM TOPICAL at 21:00

## 2021-01-01 RX ADMIN — MINERAL OIL: 1000 LIQUID ORAL at 10:34

## 2021-01-01 RX ADMIN — LEVETIRACETAM 500 MG: 5 INJECTION INTRAVASCULAR at 21:55

## 2021-01-01 RX ADMIN — ASPIRIN 325 MG ORAL TABLET 325 MG: 325 PILL ORAL at 09:20

## 2021-01-01 RX ADMIN — GLYCOPYRROLATE 0.1 MG: 0.2 INJECTION INTRAMUSCULAR; INTRAVENOUS at 00:55

## 2021-01-01 RX ADMIN — BUDESONIDE 0.5 MG: 0.5 INHALANT RESPIRATORY (INHALATION) at 09:21

## 2021-01-01 RX ADMIN — MINERAL OIL: 1000 LIQUID ORAL at 17:12

## 2021-01-01 RX ADMIN — LORAZEPAM 0.5 MG: 2 INJECTION INTRAMUSCULAR; INTRAVENOUS at 05:40

## 2021-01-01 RX ADMIN — MINERAL OIL: 1000 LIQUID ORAL at 01:25

## 2021-01-01 RX ADMIN — MINERAL OIL: 1000 LIQUID ORAL at 09:28

## 2021-01-01 RX ADMIN — MINERAL OIL: 1000 LIQUID ORAL at 14:08

## 2021-01-01 RX ADMIN — LORAZEPAM 0.5 MG: 2 INJECTION INTRAMUSCULAR; INTRAVENOUS at 09:40

## 2021-01-01 RX ADMIN — HEPARIN SODIUM 5000 UNITS: 5000 INJECTION INTRAVENOUS; SUBCUTANEOUS at 20:17

## 2021-01-01 RX ADMIN — MEROPENEM 1 G: 1 INJECTION, POWDER, FOR SOLUTION INTRAVENOUS at 21:56

## 2021-01-01 RX ADMIN — GLYCOPYRROLATE 1 MG: 1 TABLET ORAL at 12:41

## 2021-01-01 RX ADMIN — LORAZEPAM 0.5 MG: 2 INJECTION INTRAMUSCULAR; INTRAVENOUS at 18:14

## 2021-01-01 RX ADMIN — INSULIN LISPRO 4 UNITS: 100 INJECTION, SOLUTION INTRAVENOUS; SUBCUTANEOUS at 12:43

## 2021-01-01 RX ADMIN — FUROSEMIDE 20 MG: 20 TABLET ORAL at 18:24

## 2021-01-01 RX ADMIN — GLYCOPYRROLATE 0.2 MG: 0.2 INJECTION INTRAMUSCULAR; INTRAVENOUS at 22:03

## 2021-01-01 RX ADMIN — MORPHINE SULFATE 4 MG: 4 INJECTION, SOLUTION INTRAMUSCULAR; INTRAVENOUS at 07:50

## 2021-01-01 RX ADMIN — MORPHINE SULFATE 2 MG: 2 INJECTION, SOLUTION INTRAMUSCULAR; INTRAVENOUS at 17:04

## 2021-01-01 RX ADMIN — Medication: at 21:15

## 2021-01-01 RX ADMIN — NYSTATIN: 100000 POWDER TOPICAL at 14:21

## 2021-01-01 RX ADMIN — DOCOSANOL 1 APPLICATION: 100 CREAM TOPICAL at 21:47

## 2021-01-01 RX ADMIN — ASPIRIN 325 MG ORAL TABLET 325 MG: 325 PILL ORAL at 09:22

## 2021-01-01 RX ADMIN — NYSTATIN: 100000 POWDER TOPICAL at 09:39

## 2021-01-01 RX ADMIN — SODIUM CHLORIDE, PRESERVATIVE FREE 10 ML: 5 INJECTION INTRAVENOUS at 21:56

## 2021-01-01 RX ADMIN — LORAZEPAM 0.5 MG: 2 INJECTION INTRAMUSCULAR; INTRAVENOUS at 21:25

## 2021-01-01 RX ADMIN — DOCOSANOL 1 APPLICATION: 100 CREAM TOPICAL at 10:28

## 2021-01-01 RX ADMIN — FUROSEMIDE 20 MG: 10 INJECTION INTRAMUSCULAR; INTRAVENOUS at 02:05

## 2021-01-01 RX ADMIN — POLYVINYL ALCOHOL 2 DROP: 14 SOLUTION/ DROPS OPHTHALMIC at 09:29

## 2021-01-01 RX ADMIN — POLYVINYL ALCOHOL 2 DROP: 14 SOLUTION/ DROPS OPHTHALMIC at 17:12

## 2021-01-01 RX ADMIN — LEVETIRACETAM 500 MG: 5 INJECTION INTRAVASCULAR at 09:17

## 2021-01-01 RX ADMIN — MORPHINE SULFATE 2 MG: 2 INJECTION, SOLUTION INTRAMUSCULAR; INTRAVENOUS at 14:06

## 2021-01-01 RX ADMIN — DOCOSANOL 1 APPLICATION: 100 CREAM TOPICAL at 17:30

## 2021-01-01 RX ADMIN — LORAZEPAM 0.5 MG: 2 INJECTION INTRAMUSCULAR; INTRAVENOUS at 05:20

## 2021-01-01 RX ADMIN — MEROPENEM 1 G: 1 INJECTION, POWDER, FOR SOLUTION INTRAVENOUS at 12:42

## 2021-01-01 RX ADMIN — POLYVINYL ALCOHOL 2 DROP: 14 SOLUTION/ DROPS OPHTHALMIC at 15:02

## 2021-01-01 RX ADMIN — MORPHINE SULFATE 4 MG: 4 INJECTION, SOLUTION INTRAMUSCULAR; INTRAVENOUS at 13:45

## 2021-01-01 RX ADMIN — POLYVINYL ALCOHOL 2 DROP: 14 SOLUTION/ DROPS OPHTHALMIC at 09:40

## 2021-01-01 RX ADMIN — FUROSEMIDE 20 MG: 10 INJECTION INTRAMUSCULAR; INTRAVENOUS at 02:22

## 2021-01-01 RX ADMIN — Medication: at 21:48

## 2021-01-01 RX ADMIN — LORAZEPAM 0.5 MG: 2 INJECTION INTRAMUSCULAR; INTRAVENOUS at 10:34

## 2021-01-01 RX ADMIN — MINERAL OIL: 1000 LIQUID ORAL at 17:30

## 2021-01-01 RX ADMIN — SCOPALAMINE 1 PATCH: 1 PATCH, EXTENDED RELEASE TRANSDERMAL at 07:50

## 2021-01-01 RX ADMIN — MINERAL OIL: 1000 LIQUID ORAL at 19:59

## 2021-01-01 RX ADMIN — POLYVINYL ALCOHOL 2 DROP: 14 SOLUTION/ DROPS OPHTHALMIC at 04:15

## 2021-01-01 RX ADMIN — NYSTATIN: 100000 POWDER TOPICAL at 20:26

## 2021-01-01 RX ADMIN — LORAZEPAM 0.5 MG: 2 INJECTION INTRAMUSCULAR; INTRAVENOUS at 17:32

## 2021-01-01 RX ADMIN — INSULIN HUMAN 3 UNITS: 100 INJECTION, SOLUTION PARENTERAL at 13:03

## 2021-01-01 RX ADMIN — DOCOSANOL 1 APPLICATION: 100 CREAM TOPICAL at 19:58

## 2021-01-01 RX ADMIN — MORPHINE SULFATE 2 MG: 2 INJECTION, SOLUTION INTRAMUSCULAR; INTRAVENOUS at 21:12

## 2021-01-01 RX ADMIN — FUROSEMIDE 20 MG: 10 INJECTION INTRAMUSCULAR; INTRAVENOUS at 19:56

## 2021-01-01 RX ADMIN — METOCLOPRAMIDE HYDROCHLORIDE 5 MG: 5 SOLUTION ORAL at 18:04

## 2021-01-01 RX ADMIN — CHLORHEXIDINE GLUCONATE 15 ML: 1.2 SOLUTION ORAL at 09:56

## 2021-01-01 RX ADMIN — METOCLOPRAMIDE HYDROCHLORIDE 5 MG: 5 SOLUTION ORAL at 13:09

## 2021-01-01 RX ADMIN — FUROSEMIDE 20 MG: 10 INJECTION INTRAMUSCULAR; INTRAVENOUS at 17:30

## 2021-01-01 RX ADMIN — MORPHINE SULFATE 2 MG: 2 INJECTION, SOLUTION INTRAMUSCULAR; INTRAVENOUS at 03:26

## 2021-01-01 RX ADMIN — POLYVINYL ALCOHOL 2 DROP: 14 SOLUTION/ DROPS OPHTHALMIC at 21:57

## 2021-01-01 RX ADMIN — LORAZEPAM 0.5 MG: 2 INJECTION INTRAMUSCULAR; INTRAVENOUS at 22:38

## 2021-01-01 RX ADMIN — INSULIN HUMAN 3 UNITS: 100 INJECTION, SOLUTION PARENTERAL at 01:36

## 2021-01-01 RX ADMIN — INSULIN HUMAN 4 UNITS: 100 INJECTION, SOLUTION PARENTERAL at 18:24

## 2021-01-01 RX ADMIN — MORPHINE SULFATE 4 MG: 10 SOLUTION ORAL at 09:22

## 2021-01-01 RX ADMIN — POLYVINYL ALCOHOL 2 DROP: 14 SOLUTION/ DROPS OPHTHALMIC at 22:48

## 2021-01-01 RX ADMIN — INSULIN HUMAN 4 UNITS: 100 INJECTION, SOLUTION PARENTERAL at 17:59

## 2021-01-01 RX ADMIN — MORPHINE SULFATE 2 MG: 2 INJECTION, SOLUTION INTRAMUSCULAR; INTRAVENOUS at 02:05

## 2021-01-01 RX ADMIN — GLYCOPYRROLATE 0.2 MG: 0.2 INJECTION INTRAMUSCULAR; INTRAVENOUS at 07:38

## 2021-01-01 RX ADMIN — NYSTATIN: 100000 POWDER TOPICAL at 07:38

## 2021-01-01 RX ADMIN — MORPHINE SULFATE 4 MG: 4 INJECTION, SOLUTION INTRAMUSCULAR; INTRAVENOUS at 19:57

## 2021-01-01 RX ADMIN — FUROSEMIDE 20 MG: 20 TABLET ORAL at 18:04

## 2021-01-01 RX ADMIN — GLYCOPYRROLATE 0.2 MG: 0.2 INJECTION INTRAMUSCULAR; INTRAVENOUS at 08:14

## 2021-01-01 RX ADMIN — LORAZEPAM 0.5 MG: 2 INJECTION INTRAMUSCULAR; INTRAVENOUS at 02:05

## 2021-01-01 RX ADMIN — MINERAL OIL: 1000 LIQUID ORAL at 01:14

## 2021-01-01 RX ADMIN — POLYVINYL ALCOHOL 2 DROP: 14 SOLUTION/ DROPS OPHTHALMIC at 07:38

## 2021-01-01 RX ADMIN — INSULIN HUMAN 4 UNITS: 100 INJECTION, SOLUTION PARENTERAL at 01:46

## 2021-01-01 RX ADMIN — KETOROLAC TROMETHAMINE 15 MG: 30 INJECTION, SOLUTION INTRAMUSCULAR; INTRAVENOUS at 02:33

## 2021-01-01 RX ADMIN — SODIUM CHLORIDE, PRESERVATIVE FREE 10 ML: 5 INJECTION INTRAVENOUS at 09:17

## 2021-01-01 RX ADMIN — POLYVINYL ALCOHOL 2 DROP: 14 SOLUTION/ DROPS OPHTHALMIC at 19:56

## 2021-01-01 RX ADMIN — DOCOSANOL 1 APPLICATION: 100 CREAM TOPICAL at 19:43

## 2021-01-01 RX ADMIN — MORPHINE SULFATE 2 MG: 2 INJECTION, SOLUTION INTRAMUSCULAR; INTRAVENOUS at 09:30

## 2021-01-01 RX ADMIN — METHYLPREDNISOLONE SODIUM SUCCINATE 40 MG: 40 INJECTION, POWDER, FOR SOLUTION INTRAMUSCULAR; INTRAVENOUS at 09:22

## 2021-01-01 RX ADMIN — LORAZEPAM 1 MG: 2 INJECTION INTRAMUSCULAR; INTRAVENOUS at 20:27

## 2021-01-01 RX ADMIN — LORAZEPAM 1 MG: 2 INJECTION INTRAMUSCULAR; INTRAVENOUS at 19:42

## 2021-01-01 RX ADMIN — MINERAL OIL: 1000 LIQUID ORAL at 14:02

## 2021-01-01 RX ADMIN — METHYLPREDNISOLONE SODIUM SUCCINATE 40 MG: 40 INJECTION, POWDER, FOR SOLUTION INTRAMUSCULAR; INTRAVENOUS at 08:26

## 2021-01-01 RX ADMIN — POLYVINYL ALCOHOL 2 DROP: 14 SOLUTION/ DROPS OPHTHALMIC at 04:43

## 2021-01-01 RX ADMIN — POLYVINYL ALCOHOL 2 DROP: 14 SOLUTION/ DROPS OPHTHALMIC at 04:21

## 2021-01-01 RX ADMIN — DOCOSANOL 1 APPLICATION: 100 CREAM TOPICAL at 06:11

## 2021-01-01 RX ADMIN — MINERAL OIL: 1000 LIQUID ORAL at 13:26

## 2021-01-01 RX ADMIN — DOCOSANOL 1 APPLICATION: 100 CREAM TOPICAL at 13:50

## 2021-01-01 RX ADMIN — MORPHINE SULFATE 4 MG: 4 INJECTION, SOLUTION INTRAMUSCULAR; INTRAVENOUS at 02:22

## 2021-01-01 RX ADMIN — INSULIN HUMAN 4 UNITS: 100 INJECTION, SOLUTION PARENTERAL at 00:18

## 2021-01-01 RX ADMIN — MORPHINE SULFATE 4 MG: 4 INJECTION, SOLUTION INTRAMUSCULAR; INTRAVENOUS at 09:48

## 2021-01-01 RX ADMIN — MINERAL OIL: 1000 LIQUID ORAL at 02:24

## 2021-01-01 RX ADMIN — MEROPENEM 1 G: 1 INJECTION, POWDER, FOR SOLUTION INTRAVENOUS at 05:45

## 2021-01-01 RX ADMIN — MINERAL OIL: 1000 LIQUID ORAL at 18:17

## 2021-01-01 RX ADMIN — MORPHINE SULFATE 4 MG: 10 SOLUTION ORAL at 21:54

## 2021-01-01 RX ADMIN — MINERAL OIL: 1000 LIQUID ORAL at 09:51

## 2021-01-01 RX ADMIN — DOCOSANOL 1 APPLICATION: 100 CREAM TOPICAL at 17:00

## 2021-01-01 RX ADMIN — MINERAL OIL: 1000 LIQUID ORAL at 13:40

## 2021-01-01 RX ADMIN — FUROSEMIDE 20 MG: 10 INJECTION INTRAMUSCULAR; INTRAVENOUS at 02:24

## 2021-01-01 RX ADMIN — LORAZEPAM 1 MG: 2 INJECTION INTRAMUSCULAR; INTRAVENOUS at 14:25

## 2021-01-01 RX ADMIN — GLYCOPYRROLATE 1 MG: 1 TABLET ORAL at 09:22

## 2021-01-01 RX ADMIN — LORAZEPAM 1 MG: 2 INJECTION INTRAMUSCULAR; INTRAVENOUS at 02:22

## 2021-01-01 RX ADMIN — MORPHINE SULFATE 2 MG: 2 INJECTION, SOLUTION INTRAMUSCULAR; INTRAVENOUS at 01:24

## 2021-01-01 RX ADMIN — Medication: at 21:58

## 2021-01-01 RX ADMIN — DOCOSANOL 1 APPLICATION: 100 CREAM TOPICAL at 14:07

## 2021-01-01 RX ADMIN — NYSTATIN 1 APPLICATION: 100000 POWDER TOPICAL at 20:08

## 2021-01-01 RX ADMIN — MORPHINE SULFATE 2 MG: 2 INJECTION, SOLUTION INTRAMUSCULAR; INTRAVENOUS at 05:40

## 2021-01-01 RX ADMIN — MORPHINE SULFATE 4 MG: 4 INJECTION, SOLUTION INTRAMUSCULAR; INTRAVENOUS at 07:39

## 2021-01-01 RX ADMIN — FUROSEMIDE 20 MG: 10 INJECTION INTRAMUSCULAR; INTRAVENOUS at 13:44

## 2021-01-01 RX ADMIN — MINERAL OIL: 1000 LIQUID ORAL at 05:42

## 2021-01-01 RX ADMIN — METHYLPREDNISOLONE SODIUM SUCCINATE 40 MG: 40 INJECTION, POWDER, FOR SOLUTION INTRAMUSCULAR; INTRAVENOUS at 21:45

## 2021-01-01 RX ADMIN — KETOROLAC TROMETHAMINE 15 MG: 30 INJECTION, SOLUTION INTRAMUSCULAR at 16:26

## 2021-01-01 RX ADMIN — NYSTATIN: 100000 POWDER TOPICAL at 21:13

## 2021-01-01 RX ADMIN — SCOPALAMINE 1 PATCH: 1 PATCH, EXTENDED RELEASE TRANSDERMAL at 17:09

## 2021-01-01 RX ADMIN — LORAZEPAM 0.5 MG: 2 INJECTION INTRAMUSCULAR; INTRAVENOUS at 06:01

## 2021-01-01 RX ADMIN — METOCLOPRAMIDE HYDROCHLORIDE 5 MG: 5 SOLUTION ORAL at 21:33

## 2021-01-01 RX ADMIN — SODIUM CHLORIDE, POTASSIUM CHLORIDE, SODIUM LACTATE AND CALCIUM CHLORIDE 2000 ML: 600; 310; 30; 20 INJECTION, SOLUTION INTRAVENOUS at 21:34

## 2021-01-01 RX ADMIN — MORPHINE SULFATE 2 MG: 2 INJECTION, SOLUTION INTRAMUSCULAR; INTRAVENOUS at 17:12

## 2021-01-01 RX ADMIN — MORPHINE SULFATE 2 MG: 2 INJECTION, SOLUTION INTRAMUSCULAR; INTRAVENOUS at 17:30

## 2021-01-01 RX ADMIN — MORPHINE SULFATE 1 MG: 2 INJECTION, SOLUTION INTRAMUSCULAR; INTRAVENOUS at 23:56

## 2021-01-01 RX ADMIN — LORAZEPAM 1 MG: 2 INJECTION INTRAMUSCULAR; INTRAVENOUS at 09:04

## 2021-01-01 RX ADMIN — LORAZEPAM 0.5 MG: 2 INJECTION INTRAMUSCULAR; INTRAVENOUS at 01:53

## 2021-01-01 RX ADMIN — MINERAL OIL: 1000 LIQUID ORAL at 03:25

## 2021-01-01 RX ADMIN — NYSTATIN: 100000 POWDER TOPICAL at 10:18

## 2021-01-01 RX ADMIN — MORPHINE SULFATE 2 MG: 2 INJECTION, SOLUTION INTRAMUSCULAR; INTRAVENOUS at 21:58

## 2021-01-01 RX ADMIN — LEVETIRACETAM 500 MG: 5 INJECTION INTRAVASCULAR at 09:22

## 2021-01-01 RX ADMIN — SODIUM CHLORIDE, PRESERVATIVE FREE 10 ML: 5 INJECTION INTRAVENOUS at 20:35

## 2021-01-01 RX ADMIN — BUDESONIDE 0.5 MG: 0.5 INHALANT RESPIRATORY (INHALATION) at 10:37

## 2021-01-01 RX ADMIN — FUROSEMIDE 20 MG: 10 INJECTION INTRAMUSCULAR; INTRAVENOUS at 18:14

## 2021-01-01 RX ADMIN — Medication 30 ML: at 12:10

## 2021-01-01 RX ADMIN — GLYCOPYRROLATE 0.2 MG: 0.2 INJECTION INTRAMUSCULAR; INTRAVENOUS at 17:03

## 2021-01-01 RX ADMIN — POLYVINYL ALCOHOL 2 DROP: 14 SOLUTION/ DROPS OPHTHALMIC at 14:26

## 2021-01-01 RX ADMIN — SODIUM CHLORIDE 50 ML/HR: 4.5 INJECTION, SOLUTION INTRAVENOUS at 06:05

## 2021-01-01 RX ADMIN — HEPARIN SODIUM 5000 UNITS: 5000 INJECTION INTRAVENOUS; SUBCUTANEOUS at 09:22

## 2021-01-01 RX ADMIN — MINERAL OIL: 1000 LIQUID ORAL at 10:22

## 2021-01-01 RX ADMIN — INSULIN HUMAN 2 UNITS: 100 INJECTION, SOLUTION PARENTERAL at 00:48

## 2021-01-01 RX ADMIN — DOCOSANOL 1 APPLICATION: 100 CREAM TOPICAL at 10:18

## 2021-01-01 RX ADMIN — INSULIN HUMAN 3 UNITS: 100 INJECTION, SOLUTION PARENTERAL at 06:41

## 2021-01-01 RX ADMIN — KETOROLAC TROMETHAMINE 15 MG: 30 INJECTION, SOLUTION INTRAMUSCULAR at 08:25

## 2021-01-01 RX ADMIN — POLYVINYL ALCOHOL 2 DROP: 14 SOLUTION/ DROPS OPHTHALMIC at 09:50

## 2021-01-01 RX ADMIN — FUROSEMIDE 20 MG: 10 INJECTION INTRAMUSCULAR; INTRAVENOUS at 19:42

## 2021-01-01 RX ADMIN — MORPHINE SULFATE 2 MG: 2 INJECTION, SOLUTION INTRAMUSCULAR; INTRAVENOUS at 01:53

## 2021-01-01 RX ADMIN — POLYVINYL ALCOHOL 2 DROP: 14 SOLUTION/ DROPS OPHTHALMIC at 10:28

## 2021-01-01 RX ADMIN — NYSTATIN: 100000 POWDER TOPICAL at 21:14

## 2021-01-01 RX ADMIN — ACETAMINOPHEN 650 MG: 650 SUPPOSITORY RECTAL at 21:34

## 2021-01-01 RX ADMIN — MORPHINE SULFATE 2 MG: 2 INJECTION, SOLUTION INTRAMUSCULAR; INTRAVENOUS at 21:25

## 2021-01-01 RX ADMIN — METOCLOPRAMIDE HYDROCHLORIDE 5 MG: 5 SOLUTION ORAL at 05:45

## 2021-01-01 RX ADMIN — LORAZEPAM 1 MG: 2 INJECTION INTRAMUSCULAR; INTRAVENOUS at 23:55

## 2021-01-01 RX ADMIN — MORPHINE SULFATE 2 MG: 2 INJECTION, SOLUTION INTRAMUSCULAR; INTRAVENOUS at 05:10

## 2021-01-01 RX ADMIN — NYSTATIN: 100000 POWDER TOPICAL at 21:48

## 2021-01-01 RX ADMIN — MORPHINE SULFATE 4 MG: 10 SOLUTION ORAL at 01:36

## 2021-01-01 RX ADMIN — MORPHINE SULFATE 2 MG: 2 INJECTION, SOLUTION INTRAMUSCULAR; INTRAVENOUS at 14:00

## 2021-01-01 RX ADMIN — INSULIN HUMAN 3 UNITS: 100 INJECTION, SOLUTION PARENTERAL at 06:04

## 2021-01-01 RX ADMIN — Medication 30 ML: at 22:00

## 2021-01-01 RX ADMIN — Medication 5 MG: at 21:56

## 2021-01-01 RX ADMIN — LORAZEPAM 0.5 MG: 2 INJECTION INTRAMUSCULAR; INTRAVENOUS at 14:00

## 2021-01-01 RX ADMIN — MULTIVITAMIN TABLET 1 TABLET: TABLET at 09:21

## 2021-01-01 RX ADMIN — POLYVINYL ALCOHOL 2 DROP: 14 SOLUTION/ DROPS OPHTHALMIC at 11:06

## 2021-01-01 RX ADMIN — BISACODYL 10 MG: 10 SUPPOSITORY RECTAL at 22:15

## 2021-01-01 RX ADMIN — MORPHINE SULFATE 2 MG: 2 INJECTION, SOLUTION INTRAMUSCULAR; INTRAVENOUS at 17:55

## 2021-01-01 RX ADMIN — POLYVINYL ALCOHOL 2 DROP: 14 SOLUTION/ DROPS OPHTHALMIC at 14:00

## 2021-01-01 RX ADMIN — LEVETIRACETAM 500 MG: 5 INJECTION INTRAVASCULAR at 20:39

## 2021-01-01 RX ADMIN — METOCLOPRAMIDE HYDROCHLORIDE 5 MG: 5 SOLUTION ORAL at 18:37

## 2021-01-01 RX ADMIN — Medication: at 09:51

## 2021-01-01 RX ADMIN — FUROSEMIDE 20 MG: 10 INJECTION INTRAMUSCULAR; INTRAVENOUS at 13:38

## 2021-01-01 RX ADMIN — MINERAL OIL: 1000 LIQUID ORAL at 05:21

## 2021-01-01 RX ADMIN — MINERAL OIL: 1000 LIQUID ORAL at 21:26

## 2021-01-01 RX ADMIN — POLYVINYL ALCOHOL 2 DROP: 14 SOLUTION/ DROPS OPHTHALMIC at 22:38

## 2021-12-17 PROBLEM — G40.909 EPILEPSY (HCC): Status: ACTIVE | Noted: 2021-01-01

## 2021-12-17 PROBLEM — E11.9 DM (DIABETES MELLITUS) (HCC): Status: ACTIVE | Noted: 2021-01-01

## 2021-12-17 PROBLEM — G80.9 CEREBRAL PALSY (HCC): Status: ACTIVE | Noted: 2021-01-01

## 2021-12-17 PROBLEM — K21.9 GERD WITHOUT ESOPHAGITIS: Status: ACTIVE | Noted: 2021-01-01

## 2021-12-17 PROBLEM — I10 ESSENTIAL HYPERTENSION: Status: ACTIVE | Noted: 2021-01-01

## 2021-12-17 PROBLEM — E03.9 HYPOTHYROIDISM: Status: ACTIVE | Noted: 2021-01-01

## 2021-12-17 PROBLEM — J18.9 PNEUMONIA OF RIGHT UPPER LOBE DUE TO INFECTIOUS ORGANISM: Status: ACTIVE | Noted: 2021-01-01

## 2021-12-17 PROBLEM — Z86.69 HISTORY OF ANOXIC BRAIN INJURY: Status: ACTIVE | Noted: 2021-01-01

## 2021-12-17 PROBLEM — A41.9 SEPSIS (HCC): Status: ACTIVE | Noted: 2021-01-01

## 2021-12-17 PROBLEM — J44.9 COPD (CHRONIC OBSTRUCTIVE PULMONARY DISEASE): Status: ACTIVE | Noted: 2021-01-01

## 2021-12-17 PROBLEM — F25.9 SCHIZOAFFECTIVE DISORDER (HCC): Status: ACTIVE | Noted: 2021-01-01

## 2021-12-18 NOTE — PROGRESS NOTES
"Pharmacy Consult-Vancomycin Dosing  Yulsia Valdez is a  61 y.o. female receiving vancomycin therapy.     Indication: Upper Respiratory tract Infection  Consulting Provider: Willian HOUSTON Consult: No    Goal AUC: 400 - 600 mg/L*hr    Current Antimicrobial Therapy  Anti-Infectives (From admission, onward)      Ordered     Dose/Rate Route Frequency Start Stop    12/17/21 2300  meropenem (MERREM) 1 g/100 mL 0.9% NS (mbp)        Ordering Provider: Erik Dorsey PA-C    1 g  over 3 Hours Intravenous Every 8 Hours 12/18/21 0600 12/25/21 0559    12/17/21 2305  vancomycin 1500 mg/500 mL 0.9% NS IVPB ([facility])        Ordering Provider: Guero Valles PharmD    20 mg/kg × 73.9 kg  over 90 Minutes Intravenous Once 12/17/21 2330      12/17/21 2300  Pharmacy to dose vancomycin        Ordering Provider: Erik Dorsey PA-C     Does not apply Continuous PRN 12/17/21 2259 12/24/21 2258 12/17/21 1949  meropenem (MERREM) 1 g/100 mL 0.9% NS (mbp)        Ordering Provider: Salvador Laughlin MD    1 g  over 30 Minutes Intravenous Once 12/17/21 1951 12/17/21 2204            Allergies  Allergies as of 12/17/2021 - Reviewed 12/17/2021   Allergen Reaction Noted    Amoxicillin Unknown (See Comments) 04/28/2018    Cefepime Other (See Comments) 12/17/2021    Haldol [haloperidol] Unknown (See Comments) 04/28/2018    Levofloxacin Other (See Comments) 12/17/2021    Penicillins Unknown (See Comments) 04/28/2018       Labs    Results from last 7 days   Lab Units 12/17/21 2048 12/13/21  0459   BUN mg/dL 14 12   CREATININE mg/dL 0.71 0.48*       Results from last 7 days   Lab Units 12/17/21 2047 12/13/21  0459   WBC 10*3/mm3 16.53* 6.20       Evaluation of Dosing     Last Dose Received in the ED/Outside Facility: 1500mg x 1   Is Patient on Dialysis or Renal Replacement: n/a    Ht - 165 cm (64.96\")  Wt - 73.9 kg (163 lb)    Estimated Creatinine Clearance: 81.6 mL/min (by C-G formula based on SCr of 0.71 mg/dL).    Intake & Output (last 3 " days)       None            Microbiology and Radiology  Microbiology Results (last 10 days)       Procedure Component Value - Date/Time    Respiratory Panel PCR w/COVID-19(SARS-CoV-2) JANIS/KALE/GEOVANI/PAD/COR/MAD/MATHIEU In-House, NP Swab in UTM/VTM, 3-4 HR TAT - Swab, Nasopharynx [[id number]]  (Normal) Collected: 12/17/21 2128    Lab Status: Final result Specimen: Swab from Nasopharynx Updated: 12/17/21 2230     ADENOVIRUS, PCR Not Detected     Coronavirus 229E Not Detected     Coronavirus HKU1 Not Detected     Coronavirus NL63 Not Detected     Coronavirus OC43 Not Detected     COVID19 Not Detected     Human Metapneumovirus Not Detected     Human Rhinovirus/Enterovirus Not Detected     Influenza A PCR Not Detected     Influenza B PCR Not Detected     Parainfluenza Virus 1 Not Detected     Parainfluenza Virus 2 Not Detected     Parainfluenza Virus 3 Not Detected     Parainfluenza Virus 4 Not Detected     RSV, PCR Not Detected     Bordetella pertussis pcr Not Detected     Bordetella parapertussis PCR Not Detected     Chlamydophila pneumoniae PCR Not Detected     Mycoplasma pneumo by PCR Not Detected    Narrative:      In the setting of a positive respiratory panel with a viral infection PLUS a negative procalcitonin without other underlying concern for bacterial infection, consider observing off antibiotics or discontinuation of antibiotics and continue supportive care. If the respiratory panel is positive for atypical bacterial infection (Bordetella pertussis, Chlamydophila pneumoniae, or Mycoplasma pneumoniae), consider antibiotic de-escalation to target atypical bacterial infection.    COVID PRE-OP / PRE-PROCEDURE SCREENING ORDER (NO ISOLATION) - Swab, Nasal Cavity [[id number]]  (Normal) Collected: 12/12/21 0929    Lab Status: Final result Specimen: Swab from Nasal Cavity Updated: 12/12/21 1138    Narrative:      The following orders were created for panel order COVID PRE-OP / PRE-PROCEDURE SCREENING ORDER (NO ISOLATION) -  Swab, Nasal Cavity.  Procedure                               Abnormality         Status                     ---------                               -----------         ------                     COVID-19,CEPHEID/VALERI/BD...[[id number]]  Normal              Final result                 Please view results for these tests on the individual orders.    COVID-19,CEPHEID/VALERI/BDMAX,COR/GEOVANI/PAD/JUAN IN-HOUSE(OR EMERGENT/ADD-ON),NP SWAB IN TRANSPORT MEDIA 3-4 HR TAT, RT-PCR - Swab, Nasopharynx [[id number]]  (Normal) Collected: 12/12/21 0929    Lab Status: Final result Specimen: Swab from Nasopharynx Updated: 12/12/21 1138     COVID19 Not Detected    Narrative:      Fact sheet for providers: https://www.fda.gov/media/547645/download     Fact sheet for patients: https://www.fda.gov/media/814335/download  Fact sheet for providers: https://www.fda.gov/media/457046/download     Fact sheet for patients: https://www.fda.gov/media/459437/download    COVID PRE-OP / PRE-PROCEDURE SCREENING ORDER (NO ISOLATION) - Swab, Nasopharynx [[id number]]  (Normal) Collected: 12/10/21 1237    Lab Status: Final result Specimen: Swab from Nasopharynx Updated: 12/10/21 1317    Narrative:      The following orders were created for panel order COVID PRE-OP / PRE-PROCEDURE SCREENING ORDER (NO ISOLATION) - Swab, Nasopharynx.  Procedure                               Abnormality         Status                     ---------                               -----------         ------                     COVID-19 and FLU A/B PCR...[[id number]]  Normal              Final result                 Please view results for these tests on the individual orders.    COVID-19 and FLU A/B PCR - Swab, Nasopharynx [[id number]]  (Normal) Collected: 12/10/21 1237    Lab Status: Final result Specimen: Swab from Nasopharynx Updated: 12/10/21 1317     COVID19 Not Detected     Influenza A PCR Not Detected     Influenza B PCR Not Detected    Narrative:      Fact sheet for providers:  https://www.fda.gov/media/540355/download    Fact sheet for patients: https://www.fda.gov/media/056843/download    Test performed by PCR.            Reported Vancomycin Levels                         Insight[employer] AUC Calculation:    Current AUC:   n/a mg/L*hr    Predicted Steady State AUC on Current Dose: n/a mg/L*hr  _________________________________    Predicted Steady State AUC on New Dose:   519 mg/L*hr    Assessment/Plan:  Pharmacy to dose Vancomycin secondary to upper respiratory tract infection. Target -600.   Patient loaded with Vancomycin 1500mg (~20mg/kg) x 1 dose. Maintenance therapy continued with 1000mg every 12 hours. Will reassess with trough.    Vancomycin trough scheduled for 1230 on 12/19.  Pharmacy will continue to follow.     Guero Valles, PharmD, BCPS  12/17/2021  23:24 EST

## 2021-12-18 NOTE — NURSING NOTE
Pt arrived from ED around 0330. Pt was sleeping, aroused to voice. F/c changed per protocol and per orders. Pt also received a bath and is on 4L humidified o2. Pt sleeping through remainder of shift. Will continue to monitor.

## 2021-12-18 NOTE — CONSULTS
"                  Clinical Nutrition     Nutrition Assessment  Reason for Visit:   Physician consult, EN      Patient Name: Yulisa Valdez  YOB: 1960  MRN: [id number]  Date of Encounter: 12/18/21 15:28 EST  Admission date: 12/17/2021      Comments:   Order entered for  Isosource 1.5 25 ml/hr advance after 8 hr tolerance to goal 45 ml/hr Give 1 Prostat/da  Water at 30 ml/hr. Order also entered for multi vit/min as formula at this rate does not provide full RDA.   EN will supply 1585 Kcal 106% est need, 82 g % est need, 15 g Fiber, 752 ml H20 in TF 1412 total ml Free Water.         Admission Diagnosis    Sepsis (HCC) [A41.9]      Problem List    Pneumonia of right upper lobe due to infectious organism    History of anoxic brain injury    Epilepsy (HCC)    Cerebral palsy (HCC)    Schizoaffective disorder (HCC)    COPD (chronic obstructive pulmonary disease) (HCC)    GERD without esophagitis    DM (diabetes mellitus) (HCC)    Hypothyroidism    Essential hypertension    FTT    Other Applicable:  Hx recurrent asp PNA, osteoporosis    Applicable Interval History:      Applicable PMH/PSxH:     PMH: She  has a past medical history of COPD (chronic obstructive pulmonary disease) (HCC), Diabetes (HCC), Epilepsy (HCC), GERD (gastroesophageal reflux disease), Osteoporosis, Schizoaffective disorder (HCC), and Seizures (HCC).   PSxH: She  has a past surgical history that includes Tracheostomy tube placement.         Diet/Nutrition Related History:     Order for EN.       Anthropometrics     Admission Height 165.1 cm (65\") Documented at 12/17/2021 1936   Admission Weight 73.9 kg (163 lb) Documented at 12/17/2021 1936        Height: 165 cm (64.96\")    Last filed wt: Weight: 73.9 kg (163 lb) (12/17/21 1938)  Weight Method: Estimated    BMI: BMI (Calculated): 27.2  Overweight: 25.0-29.9kg/m2     Ideal Body Weight (IBW) (kg): 57.2      Weight Change   UBW: rpt of 175 lbs in 2019  Weight change:   % wt " change:   Time frame of weight loss:     Labs reviewed   Yes  Note elevated pBNP  Results from last 7 days   Lab Units 12/18/21  1115 12/18/21  1043 12/17/21 2048 12/13/21 0459 12/13/21  0459   GLUCOSE mg/dL 268* 265* 219*   < > 170*   BUN mg/dL 15 15 14   < > 12   CREATININE mg/dL 0.44* 0.48* 0.71   < > 0.48*   SODIUM mmol/L 143 143 138   < > 141   CHLORIDE mmol/L 108* 108* 101   < > 105   POTASSIUM mmol/L 3.6 3.6 3.4*   < > 3.6   MAGNESIUM mg/dL  --   --  2.4  --   --    ALT (SGPT) U/L 24  --  34*  --  29    < > = values in this interval not displayed.     Results from last 7 days   Lab Units 12/18/21  1115 12/17/21 2048 12/13/21 0459   ALBUMIN g/dL 2.60* 3.10* 2.74*   CRP mg/dL  --   --  1.52*       Results from last 7 days   Lab Units 12/18/21  1159 12/18/21  0751 12/13/21  1300 12/13/21  0512 12/13/21  0126 12/12/21 2009   GLUCOSE mg/dL 257* 226* 125 172* 138* 169*       Lab Results   Lab Value Date/Time    HGBA1C 6.50 (H) 12/17/2021 2047       Medications reviewed   Yes  Abx, Insulin, Keppra, Multi vit min    Intake/Ouptut 24 hrs reviewed   Yes    Needs Assessment     Weight used:  73.9 Kg  IBW: 56.8 Kg    Estimated Energy needs:  ~ 500 Kcal/da based on 20 Kcal/Kg      Estimated Protein needs:  ~76 g/da based on 1.2-1.5 g/Kg IBW      Estimated Fluid needs: per clinical status      Current Nutrition Prescription     PO: NPO Diet  Orders Placed This Encounter      Diet, Tube Feeding Tube Feeding Formula: Isosource 1.5 (Calorically Dense)   goal 45 ml/hr 1 Prostat/da  Water at 30 ml/hr.     At goal EN will supply 1585 Kcal 106% est need, 82 g % est need, 15 g Fiber, 752 ml H20 in TF 1412 total ml Free Water.       Delivery:         Nutrition Diagnosis     12/18  Problem Inadequate oral intake   Etiology Pt w Tach/PEG   Signs/Symptoms NPO   Status: EN order placed      Goal:   General: Nutrition support treatment  EN: Initiate EN as appropriate, magui at goal  Additional goals:      Nutrition  Intervention     Follow treatment progress, Care plan reviewed, Nutrition support order placed      Monitoring/Evaluation:   Per protocol, Pertinent labs, EN delivery/tolerance, Weight, Symptoms        Joanne Smiley RD,   Time Spent: 30 min

## 2021-12-18 NOTE — ED PROVIDER NOTES
"Subjective   Pt presents for \"fever and respiratory distress.\"  [facility] did not send much in the way of records.  Pt with history of anoxic brain injury, tracheostomy, COPD.  History is unavailable and is gleaned from records.      History provided by:  Medical records  History limited by:  Patient nonverbal and mental status change      Review of Systems   Unable to perform ROS: Patient nonverbal       Past Medical History:   Diagnosis Date   • COPD (chronic obstructive pulmonary disease) (HCC)    • Diabetes (HCC)    • Epilepsy (HCC)    • GERD (gastroesophageal reflux disease)    • Osteoporosis    • Schizoaffective disorder (HCC)    • Seizures (HCC)        Allergies   Allergen Reactions   • Amoxicillin Unknown (See Comments)     unknown   • Cefepime Other (See Comments)     Unsure     • Haldol [Haloperidol] Unknown (See Comments)     unknown   • Levofloxacin Other (See Comments)     unsure   • Penicillins Unknown (See Comments)     unknown       Past Surgical History:   Procedure Laterality Date   • TRACHEOSTOMY         History reviewed. No pertinent family history.    Social History     Socioeconomic History   • Marital status: Single   Tobacco Use   • Smoking status: Former Smoker   • Smokeless tobacco: Never Used   Substance and Sexual Activity   • Alcohol use: No   • Drug use: No   • Sexual activity: Defer           Objective   Physical Exam  Vitals and nursing note reviewed.   Constitutional:       General: She is not in acute distress.     Appearance: Normal appearance. She is ill-appearing and diaphoretic.   HENT:      Head: Normocephalic and atraumatic.      Mouth/Throat:      Mouth: Mucous membranes are moist.   Eyes:      General: No scleral icterus.        Right eye: No discharge.         Left eye: No discharge.      Conjunctiva/sclera: Conjunctivae normal.   Neck:      Comments: Tracheostomy.  Cardiovascular:      Rate and Rhythm: Regular rhythm. Tachycardia present.      Heart sounds: No murmur " heard.      Pulmonary:      Effort: Pulmonary effort is normal. No respiratory distress.      Breath sounds: Wheezing present.      Comments: Coarse bilateral breath sounds.  Abdominal:      General: Bowel sounds are normal. There is no distension.      Palpations: Abdomen is soft.      Tenderness: There is no abdominal tenderness. There is no guarding or rebound.   Musculoskeletal:         General: No swelling. Normal range of motion.      Cervical back: Normal range of motion and neck supple.      Comments: Bilateral arm contractures.   Skin:     General: Skin is warm.      Capillary Refill: Capillary refill takes less than 2 seconds.      Findings: No rash.   Neurological:      Mental Status: She is lethargic.         Procedures           ED Course         Reviewed records, appears to have had LTACH stay in [place] until 12/13.  That record has a box checked next to covid but documentation makes no mention.  Sounds like treated for aspiration. Note is made she is a [facility] of the [facility].    Covid negative.  CXR shows RUL pneumonia, empiric treatment started.  Febrile, rectal APAP given.  IV fluids.    Serial rechecks, stable.  Admitted.                                        MDM  Number of Diagnoses or Management Options     Amount and/or Complexity of Data Reviewed  Clinical lab tests: ordered and reviewed  Tests in the radiology section of CPT®: ordered and reviewed  Decide to obtain previous medical records or to obtain history from someone other than the patient: yes  Review and summarize past medical records: yes  Discuss the patient with other providers: yes  Independent visualization of images, tracings, or specimens: yes        Final diagnoses:   Sepsis, due to unspecified organism, unspecified whether acute organ dysfunction present (HCC)       ED Disposition  ED Disposition     ED Disposition Condition Comment    Decision to Admit  Level of Care: Telemetry [5]   Diagnosis: Sepsis (HCC) [[id number]]   Admitting  Physician: JHOAN MENDENHALL [[id number]]   Attending Physician: JHOAN MENDENHALL [[id number]]   Bed Request Comments: tele            No follow-up provider specified.       Medication List      No changes were made to your prescriptions during this visit.          Salvador Laughlin MD  12/18/21 2843

## 2021-12-18 NOTE — CONSULTS
Palliative Care Initial Consult   Attending Physician: Paola Osborn*  Referring Provider: CLARA YIN    Reason for Referral:  assistance with clarification of goals of care    Code Status:   Code Status and Medical Interventions:   Ordered at: 12/17/21 2080     Medical Intervention Limits:    NO intubation (DNI)     Level Of Support Discussed With:    Health Care Surrogate     Code Status (Patient has no pulse and is not breathing):    No CPR (Do Not Attempt to Resuscitate)     Medical Interventions (Patient has pulse or is breathing):    Limited Support      Advanced Directives:   DNR/DNI on chart- reviewed  [facility]/Support: [facility] guardianHong Yarbrough [phone] (mobile)  Goals of Care: TBD.    HPI:   62 yo female with chronic respiratory failure s/p trach and PEG LTCF resident with additional PMH significant for  T2DM, epilepsy, GERD, osteoporosis, Schizoaffective disorder and recent LTACH admission r/t pulmonary disease DC to LTCF- [facility] on 12/13. Patient presented to [facility] ED overnight r/ t a/c respiratory distress. Work-up revealed sepsis 2/2 PNA. Palliative Care was consulted to assit with GOC and advanced care planning in the context of complex medical decision making. Patient is a mcmahon of the [facility] with DNR/DNI paperwork on chart.     ROS:   Unable to obtain r/t AMS/condition.    Above HPI and below hx per chart review as pt is unable to provide.    Past Medical History:   Diagnosis Date   • COPD (chronic obstructive pulmonary disease) (HCC)    • Diabetes (HCC)    • Epilepsy (HCC)    • GERD (gastroesophageal reflux disease)    • Osteoporosis    • Schizoaffective disorder (HCC)    • Seizures (HCC)      Past Surgical History:   Procedure Laterality Date   • TRACHEOSTOMY       Social History     Socioeconomic History   • Marital status: Single   Tobacco Use   • Smoking status: Former Smoker   • Smokeless tobacco: Never Used   Substance and Sexual Activity   • Alcohol use: No   • Drug use: No  "  • Sexual activity: Defer     History reviewed. No pertinent family history.    Allergies   Allergen Reactions   • Amoxicillin Unknown (See Comments)     unknown   • Cefepime Other (See Comments)     Unsure     • Haldol [Haloperidol] Unknown (See Comments)     unknown   • Levofloxacin Other (See Comments)     unsure   • Penicillins Unknown (See Comments)     unknown       Current medication reviewed for route, type, dose and frequency and are current per MAR at time of dictation.    Palliative Performance Scale Score:      /90 (BP Location: Right arm, Patient Position: Lying)   Pulse 82   Temp 98.1 °F (36.7 °C) (Axillary)   Resp 22   Ht 165 cm (64.96\")   Wt 73.9 kg (163 lb)   SpO2 100%   BMI 27.16 kg/m²     Intake/Output Summary (Last 24 hours) at 12/18/2021 1107  Last data filed at 12/18/2021 0248  Gross per 24 hour   Intake --   Output 1500 ml   Net -1500 ml       Physical Exam:      General Appearance:    Awake, RN in room performing trach care   HEENT:    NC/AT, anicteric, MMM, intermittent furrowed brow during visit   Neck:   supple, trachea midline with trach to trach collar, no JVD   Lungs:     + expiratory wheeze bilaterally bilat, with scattered coarse BS bilaterally, respirations even, tachy RR28 during visit with mild accessory muscle use/pulling at shoulders; O2 sats 94% on RA during trach care- up to 100% with 4L O2    Heart:    RRR, normal S1 and S2, no M/R/G   Abdomen:     Normal bowel sounds, soft, non-tender, non-distended; PEG tube noted- site with slight erythema under stopper- no exudate noted   G/U:     Extremities:   Goa patent with straw colored urine noted to   tubing/bedside bag    Severe contractions BUE and BLE extremities; no edema   Pulses:   Pulses palpable and equal bilaterally   Skin:   Warm, dry, protective dressing to BLE heals; RN reports skin is without wounds at this time   Neurologic:   Nonverbal, unable to follow commands   Psych:   Calm         Labs:   Results " from last 7 days   Lab Units 12/17/21 2047   WBC 10*3/mm3 16.53*   HEMOGLOBIN g/dL 14.1   HEMATOCRIT % 44.6   PLATELETS 10*3/mm3 256     Results from last 7 days   Lab Units 12/17/21 2048   SODIUM mmol/L 138   POTASSIUM mmol/L 3.4*   CHLORIDE mmol/L 101   CO2 mmol/L 25.0   BUN mg/dL 14   CREATININE mg/dL 0.71   GLUCOSE mg/dL 219*   CALCIUM mg/dL 9.3     Results from last 7 days   Lab Units 12/17/21 2048   SODIUM mmol/L 138   POTASSIUM mmol/L 3.4*   CHLORIDE mmol/L 101   CO2 mmol/L 25.0   BUN mg/dL 14   CREATININE mg/dL 0.71   CALCIUM mg/dL 9.3   BILIRUBIN mg/dL 0.5   ALK PHOS U/L 170*   ALT (SGPT) U/L 34*   AST (SGOT) U/L 17   GLUCOSE mg/dL 219*     Imaging Results (Last 72 Hours)     Procedure Component Value Units Date/Time    CT Angiogram Chest With & Without Contrast [[id number]] Collected: 12/18/21 0003     Updated: 12/18/21 0005    Narrative:      CT ANGIOGRAM CHEST W WO CONTRAST    INDICATION:   Fever tonight    TECHNIQUE:   CT angiogram of the chest with IV contrast. 3-D reconstructions were obtained and reviewed.   Radiation dose reduction techniques included automated exposure control or exposure modulation based on body size. Count of known CT and cardiac nuc med studies  performed in previous 12 months: 1.     COMPARISON:   [place] [facility]    FINDINGS:   Tracheostomy tube is in good position. Left lung is clear. There is atelectasis of the right lower lobe. Aorta is normal in appearance. There is adequate opacification of the pulmonary arteries. There is no CT evidence of pulmonary embolus. There is  partial atelectasis the right middle lobe. Bones are unremarkable.          Impression:      No CT evidence of pulmonary embolus    Atelectasis of most of the right lower lobe and a small amount of the right middle lobe    Otherwise negative    Signer Name: Khris Cole MD   Signed: 12/18/2021 12:03 AM   Workstation Name: [email or web address]    [facility]    XR Chest 1 View [[id number]] Collected:  12/17/21 2125     Updated: 12/17/21 2127    Narrative:      CR Chest 1 Vw    INDICATION:   Shortness of air and fever today.     COMPARISON:    Chest 10/28/2021    FINDINGS:  Portable AP view(s) of the chest.  There are hazy opacities throughout the right upper lobe and right lung base. Chronic elevation of the right hemidiaphragm. Left lung appears clear. Heart size is upper limits. Mediastinum is unremarkable. Tracheostomy  tube again noted.      Impression:      Hazy opacities in the right upper lobe and right lung base. Pneumonia is not excluded in the appropriate clinical setting.    Signer Name: Keith Dykes MD   Signed: 12/17/2021 9:25 PM   Workstation Name: [email or web address]    [facility]          Diagnostics: Reviewed    A:   Patient Active Problem List   Diagnosis   • Sepsis ([facility])   • COPD (chronic obstructive pulmonary disease) ([facility])   • GERD without esophagitis   • Epilepsy ([facility])   • Schizoaffective disorder (HCC)   • DM (diabetes mellitus) ([facility])   • Pneumonia of right upper lobe due to infectious organism   • History of anoxic brain injury   • Essential hypertension   • Hypothyroidism   • Cerebral palsy (HCC)     62yo female with sepsis 2/2 PNA- suspected aspiration.    S/S:   1. Dyspnea  -Duonebs q4h PRN- RN to give dose this am  -will add morphine 4mg PT q4h PRN for tachypnea    2. Respiratory congestion/secretions  - trach care per attending  - consider mucinex once TF/free water initiated  - pt has low dose robinul ordered 1mg per PEG    3. Presumed MSK pain  -Morphine as above    4. GOC   -has state guardian- depending on response to abx; pt quality of life appears to be declining with frequent hospitalizations. Given ongoing respiratory decline and suspected aspiration source of PNA-  patient would be appropriate for hospice care and transition to comfort focused POC.    P: Patient is a mcmahon of the state.  Will monitor patient over weekend and contact them on Monday to  discuss possible transition to comfort focused POC. Thank you for this consult and allowing us to participate in patient's plan of care. Palliative Care Team will continue to follow patient.     Time spent:45 minutes spent reviewing medical and medication records, assessing and examining patient, discussing with family, answering questions, providing some guidance about a plan and documentation of care, and coordinating care with other healthcare members, with > 50% time spent face to face.     Audelia Peralta, APRN  12/18/2021      EMR Dragon/Transcription disclaimer:  Much of this encounter note is an electronic transcription/translation of spoken language to printed text. Electronic translation of spoken language may permit erroneous, or at times, nonsensical words or phrases to be inadvertently transcribed. Although I have reviewed the note for such errors, some may still exist.

## 2021-12-18 NOTE — H&P
[facility] Medicine Services  HISTORY AND PHYSICAL    Patient Name: Yulisa Valdez  : 1960  MRN: [id number]  Primary Care Physician: Ester Moya MD  Date of admission: 2021    Subjective   Subjective     Chief Complaint:  Respiratory distress    HPI:  Yulisa Valdez is a 61 y.o. female. She is DNI/DNR mcmahon of [facility]placed by [facility] with a past medical history significant for chronic respiratory failure with COPD, cerebral palsy with epilepsy, anoxic brain injury, DM2, hypothyroidism, GERD, and tracheostomy and PEG tube placement. She presents from [facility] in acute respiratory distress with fever. She also has some increased drainage from tracheostomy. HPI limited secondary to patient disposition. She is nonverbal and obtunded. Records reviewed indicate that patient has recurrent aspiration pneumonia for which she was recently admitted to [facility] in October. She was then transferred to LTAC where she was monitored closely by pulmonary disease.  Currently there are no reports of cough, chest pain syncope, abdominal pain, or N/V/D. Will admit to inpatient for further evaluation and treatment.      COVID Details:    Symptoms:    [] NONE [] Fever [x]  Cough [] Shortness of breath [] Change in taste/smell      Review of Systems   Unable to perform ROS: Patient nonverbal        All other systems reviewed and are negative.     Personal History     Past Medical History:   Diagnosis Date   • COPD (chronic obstructive pulmonary disease) (HCC)    • Diabetes (HCC)    • Epilepsy (HCC)    • GERD (gastroesophageal reflux disease)    • Osteoporosis    • Schizoaffective disorder (HCC)    • Seizures (HCC)        Past Surgical History:   Procedure Laterality Date   • TRACHEOSTOMY         Family History: family history is not on file. Otherwise pertinent FHx was reviewed and unremarkable.     Social History:  reports that she has quit smoking. She has never used smokeless  tobacco. She reports that she does not drink alcohol and does not use drugs.  Social History     Social History Narrative    Patient is a mcmahon of the [facility] and is DNR/DNI       Medications:  Floranex, HYDROcodone-acetaminophen, amLODIPine, aspirin, budesonide, furosemide, glycopyrrolate, levETIRAcetam, metoclopramide, metoprolol tartrate, multivitamin with fluoride/iron, nystatin, potassium chloride, senna, and vitamin D    Allergies   Allergen Reactions   • Amoxicillin Unknown (See Comments)     unknown   • Cefepime Other (See Comments)     Unsure     • Haldol [Haloperidol] Unknown (See Comments)     unknown   • Levofloxacin Other (See Comments)     unsure   • Penicillins Unknown (See Comments)     unknown       Objective   Objective     Vital Signs:   Temp:  [101.1 °F (38.4 °C)] 101.1 °F (38.4 °C)  Heart Rate:  [130] 130  Resp:  [24] 24  BP: (147)/(95) 147/95    Physical Exam   Constitutional: Awake, alert  Eyes: PERRLA, sclerae anicteric, no conjunctival injection  HENT: NCAT, mucous membranes moist  Neck: Supple, no thyromegaly, no lymphadenopathy, trachea midline  Respiratory: crackles and wheezes bilaterally, thick drainage from tracheostomy nonlabored respirations   Cardiovascular: RRR, no murmurs, rubs, or gallops, palpable pedal pulses bilaterally  Gastrointestinal: Positive bowel sounds, soft, nontender, nondistended  PEG tube placement  Musculoskeletal: trace BLE edema no clubbing or cyanosis to extremities  Psychiatric: Appropriate affect, cooperative  Neurologic: Oriented x 3, strength symmetric in all extremities, Cranial Nerves grossly intact to confrontation, speech clear  Skin: No rashes      Results Reviewed:  I have personally reviewed most recent indicated data and agree with findings including:  [x]  Laboratory  [x]  Radiology  []  EKG/Telemetry  []  Pathology  []  Cardiac/Vascular Studies  []  Old records  []  Other:  Most pertinent findings include: febrile to 101.1. . Glucose 219.  Potassium 3.4. Alk phos 170. WBC 16.5. CXR shows right upper lobe pneumonia    LAB RESULTS:      Lab 12/17/21 2048 12/17/21 2047 12/13/21 0459   WBC  --  16.53* 6.20   HEMOGLOBIN  --  14.1 11.3*   HEMATOCRIT  --  44.6 37.4   PLATELETS  --  256 256   NEUTROS ABS  --  12.71* 2.98   IMMATURE GRANS (ABS)  --  0.07* 0.02   LYMPHS ABS  --  2.52 2.15   MONOS ABS  --  1.12* 0.62   EOS ABS  --  0.04 0.38   MCV  --  93.1 95.4   CRP  --   --  1.52*   PROCALCITONIN 0.17  --   --    LACTATE  --  1.4  --          Lab 12/17/21 2048 12/13/21 0459   SODIUM 138 141   POTASSIUM 3.4* 3.6   CHLORIDE 101 105   CO2 25.0 26.9   ANION GAP 12.0 9.1   BUN 14 12   CREATININE 0.71 0.48*   GLUCOSE 219* 170*   CALCIUM 9.3 9.1   MAGNESIUM 2.4  --          Lab 12/17/21 2048 12/13/21 0459   TOTAL PROTEIN 7.5 6.4   ALBUMIN 3.10* 2.74*   GLOBULIN 4.4 3.7   ALT (SGPT) 34* 29   AST (SGOT) 17 17   BILIRUBIN 0.5 <0.2   ALK PHOS 170* 146*         Lab 12/17/21 2048   PROBNP 2,686.0*   TROPONIN T 0.019                 Brief Urine Lab Results     None        Microbiology Results (last 10 days)     Procedure Component Value - Date/Time    Respiratory Panel PCR w/COVID-19(SARS-CoV-2) JANIS/KALE/GEOVANI/PAD/COR/MAD/MATHIEU In-House, NP Swab in UTM/VTM, 3-4 HR TAT - Swab, Nasopharynx [[id number]]  (Normal) Collected: 12/17/21 2128    Lab Status: Final result Specimen: Swab from Nasopharynx Updated: 12/17/21 2230     ADENOVIRUS, PCR Not Detected     Coronavirus 229E Not Detected     Coronavirus HKU1 Not Detected     Coronavirus NL63 Not Detected     Coronavirus OC43 Not Detected     COVID19 Not Detected     Human Metapneumovirus Not Detected     Human Rhinovirus/Enterovirus Not Detected     Influenza A PCR Not Detected     Influenza B PCR Not Detected     Parainfluenza Virus 1 Not Detected     Parainfluenza Virus 2 Not Detected     Parainfluenza Virus 3 Not Detected     Parainfluenza Virus 4 Not Detected     RSV, PCR Not Detected     Bordetella pertussis pcr Not  Detected     Bordetella parapertussis PCR Not Detected     Chlamydophila pneumoniae PCR Not Detected     Mycoplasma pneumo by PCR Not Detected    Narrative:      In the setting of a positive respiratory panel with a viral infection PLUS a negative procalcitonin without other underlying concern for bacterial infection, consider observing off antibiotics or discontinuation of antibiotics and continue supportive care. If the respiratory panel is positive for atypical bacterial infection (Bordetella pertussis, Chlamydophila pneumoniae, or Mycoplasma pneumoniae), consider antibiotic de-escalation to target atypical bacterial infection.    COVID PRE-OP / PRE-PROCEDURE SCREENING ORDER (NO ISOLATION) - Swab, Nasal Cavity [[id number]]  (Normal) Collected: 12/12/21 0929    Lab Status: Final result Specimen: Swab from Nasal Cavity Updated: 12/12/21 1138    Narrative:      The following orders were created for panel order COVID PRE-OP / PRE-PROCEDURE SCREENING ORDER (NO ISOLATION) - Swab, Nasal Cavity.  Procedure                               Abnormality         Status                     ---------                               -----------         ------                     COVID-19,CEPHEID/VALERI/BD...[[id number]]  Normal              Final result                 Please view results for these tests on the individual orders.    COVID-19,CEPHEID/VALERI/BDMAX,COR/GEOVANI/PAD/JUAN IN-HOUSE(OR EMERGENT/ADD-ON),NP SWAB IN TRANSPORT MEDIA 3-4 HR TAT, RT-PCR - Swab, Nasopharynx [[id number]]  (Normal) Collected: 12/12/21 0929    Lab Status: Final result Specimen: Swab from Nasopharynx Updated: 12/12/21 1138     COVID19 Not Detected    Narrative:      Fact sheet for providers: https://www.fda.gov/media/620718/download     Fact sheet for patients: https://www.fda.gov/media/563469/download  Fact sheet for providers: https://www.fda.gov/media/704955/download     Fact sheet for patients: https://www.fda.gov/media/250699/download    COVID PRE-OP /  PRE-PROCEDURE SCREENING ORDER (NO ISOLATION) - Swab, Nasopharynx [[id number]]  (Normal) Collected: 12/10/21 1237    Lab Status: Final result Specimen: Swab from Nasopharynx Updated: 12/10/21 1317    Narrative:      The following orders were created for panel order COVID PRE-OP / PRE-PROCEDURE SCREENING ORDER (NO ISOLATION) - Swab, Nasopharynx.  Procedure                               Abnormality         Status                     ---------                               -----------         ------                     COVID-19 and FLU A/B PCR...[[id number]]  Normal              Final result                 Please view results for these tests on the individual orders.    COVID-19 and FLU A/B PCR - Swab, Nasopharynx [[id number]]  (Normal) Collected: 12/10/21 1237    Lab Status: Final result Specimen: Swab from Nasopharynx Updated: 12/10/21 1317     COVID19 Not Detected     Influenza A PCR Not Detected     Influenza B PCR Not Detected    Narrative:      Fact sheet for providers: https://www.fda.gov/media/443528/download    Fact sheet for patients: https://www.fda.gov/media/577643/download    Test performed by PCR.          XR Chest 1 View    Result Date: 12/17/2021  CR Chest 1 Vw INDICATION: Shortness of air and fever today. COMPARISON:  Chest 10/28/2021 FINDINGS: Portable AP view(s) of the chest.  There are hazy opacities throughout the right upper lobe and right lung base. Chronic elevation of the right hemidiaphragm. Left lung appears clear. Heart size is upper limits. Mediastinum is unremarkable. Tracheostomy tube again noted.     Impression: Hazy opacities in the right upper lobe and right lung base. Pneumonia is not excluded in the appropriate clinical setting. Signer Name: Keith Dykes MD  Signed: 12/17/2021 9:25 PM  Workstation Name: [email or web address]  [facility]          Assessment/Plan   Assessment & Plan       Sepsis (HCC)    Pneumonia of right upper lobe due to infectious organism    COPD  (chronic obstructive pulmonary disease) (HCC)    DM (diabetes mellitus) (HCC)    Essential hypertension    GERD without esophagitis    Epilepsy (HCC)    Schizoaffective disorder (HCC)    History of anoxic brain injury      1. Sepsis secondary to pneumonia:  - suspect aspiration, but will cover for HCAP. Check UA  - on Merrem and vancomycin. Continue  - Solumedrol 80 mg X1. Consider scheduled as needed  - blood cultures, sputum culture, COVID PCR, urine antigens  - SLP treat and eval  - NT suctioning, tracheostomy care  - robinul as needed for secretions  - am labs    2. COPD  - with chronic respiratory failure  - duo nebs with additional pulmonary toilet as needed    3. DM2:  - on 10 units insulin daily. Hold for now  - SSI with scheduled accu checks  - check HbA1c    4. Hypertension:  - stable. Continue norvasc, lopressor and lasix    5. Seizure Disorder:  - history of CP  - on keppra 500 mg q 12 hours  - seizure precuations    6. Failure to thrive:  - consult to palliative for goals of care  - PT/OT/ nutrition    DVT prophylaxis: STORM    CODE STATUS:  Full code  Medical Intervention Limits: NO intubation (DNI)  Level Of Support Discussed With: Health Care Surrogate  Code Status (Patient has no pulse and is not breathing): No CPR (Do Not Attempt to Resuscitate)  Medical Interventions (Patient has pulse or is breathing): Limited Support      This note has been completed as part of a split-shared workflow.     Electronically signed by Erik Dorsey PA-C, 12/17/21, 11:48 PM EST.      Brief Attending Admission Attestation     I have seen and examined the patient, performing an independent face-to-face diagnostic evaluation with plan of care reviewed and developed with the advanced practice clinician (APC).    Old records reviewed and summarized in PM hx.    Brief Summary Statement:  61 Y F, bed bound, disabeled, [place] of state, here with Resp distress, fever, with lot of thick, sticky mucus, coming out of Trach.  Pt  is aphasic, occ opens her eyes to verbal commands.  Sent for fever Resp distress,    In ED patient got Merrem, RL 2.2 L, Tylenol 650 mg  Remainder of detailed HPI is as noted above and has been reviewed and/or edited by me for completeness.    PM HX:  Hypertension-Norvasc 10 mg, Lopressor 50 mg every 12  CAD? -aspirin 325 mg  COPD-Pulmicort  CHF? -Lasix 20 mg every 12  Chronic pain-Norco 5 mg as needed  Seizure disorder-Keppra  Chronic constipation  DM2  Hypothyroid.  Anoxic brain injury/in LTAC facility-with aphasia, contractures of upper/lower extremity, chronic trach-baseline oxygen requirement around 35% FiO2  Patient was admitted to [facility] in October 2021-for possible aspiration pneumonia.    Vitals:    12/17/21 1932   BP:  147/95   Pulse:  130   Resp: 24   Temp: (!) 101.1 °F (38.4 °C)   SpO2:  96% on room air       Attending Physical Exam:  RS- BL coarse exp crackles , with wheezing.  CVS- s1s2 regular, tachycardic, no murmur.  ABD-distended, with PEG.  EXT-contracted, with mild Gen edema.  NEURO- aphasic, lethargic, not following verbal commands.    LABS:  Troponin-less than 0.01, proBNP 2600  Blood sugar-219,  Elevated LFTs alk phos 170, ALT 34, AST 17, total bili 0.5-continue to monitor  Lactic acid 1.4  Pro-[place] 0.1  WBC of 16, hemoglobin of 14, neutrophils of 77  Respiratory panel including Covid-negative  Chest x-ray-hazy opacities in the right upper lobe/right lung base  EKG-sinus tach 121 bpm, , incomplete RBB, poor R wave progression in the lateral leads.    Brief Assessment/Plan  Hyperglycemia-fs coverage.  Hypokalemia-replace potassium, will add on magnesium  Zosyn, frequent suction, poor prognosis, hospice consult.  UA-chronic soto, still pending.    See above for further detailed assessment and plan developed with APC which I have reviewed and/or edited for completeness.      Admission Status: I believe that this patient meets inpt status, due to aspiration Pnm.

## 2021-12-18 NOTE — PROGRESS NOTES
[facility] Medicine Services  PROGRESS NOTE    Patient Name: Yulisa Valdez  : 1960  MRN: [id number]    Date of Admission: 2021  Primary Care Physician: Ester Moya MD    Subjective   Subjective     CC:  F/u respiratory failure     HPI:  No acute events overnight     ROS:  Unable to assess     Objective   Objective     Vital Signs:   Temp:  [98.1 °F (36.7 °C)-101.1 °F (38.4 °C)] 98.1 °F (36.7 °C)  Heart Rate:  [] 82  Resp:  [20-24] 22  BP: (105-147)/(87-95) 105/90     Physical Exam:  Constitutional: No acute distress, chronically ill appearing female   HENT: NCAT, mucous membranes moist, trach without discharge   Respiratory: crackles bilaterally , respiratory effort normal   Cardiovascular: RRR, no murmurs, rubs, or gallops  Gastrointestinal: Positive bowel sounds, soft, nontender, nondistended  Musculoskeletal: No bilateral ankle edema, contracture of extremities   Neurologic: Opens eyes to stimuli   Skin: No rashes      Results Reviewed:  LAB RESULTS:      Lab 219   WBC  --  16.53* 6.20   HEMOGLOBIN  --  14.1 11.3*   HEMATOCRIT  --  44.6 37.4   PLATELETS  --  256 256   NEUTROS ABS  --  12.71* 2.98   IMMATURE GRANS (ABS)  --  0.07* 0.02   LYMPHS ABS  --  2.52 2.15   MONOS ABS  --  1.12* 0.62   EOS ABS  --  0.04 0.38   MCV  --  93.1 95.4   CRP  --   --  1.52*   PROCALCITONIN 0.17  --   --    LACTATE  --  1.4  --          Lab 219   SODIUM 138  --  141   POTASSIUM 3.4*  --  3.6   CHLORIDE 101  --  105   CO2 25.0  --  26.9   ANION GAP 12.0  --  9.1   BUN 14  --  12   CREATININE 0.71  --  0.48*   GLUCOSE 219*  --  170*   CALCIUM 9.3  --  9.1   MAGNESIUM 2.4  --   --    HEMOGLOBIN A1C  --  6.50*  --          Lab 21  0459   TOTAL PROTEIN 7.5 6.4   ALBUMIN 3.10* 2.74*   GLOBULIN 4.4 3.7   ALT (SGPT) 34* 29   AST (SGOT) 17 17   BILIRUBIN 0.5 <0.2   ALK PHOS 170* 146*          Lab 12/17/21 2048   PROBNP 2,686.0*   TROPONIN T 0.019                 Brief Urine Lab Results     None          Microbiology Results Abnormal     Procedure Component Value - Date/Time    COVID PRE-OP / PRE-PROCEDURE SCREENING ORDER (NO ISOLATION) - Swab, Nasopharynx [[id number]]  (Normal) Collected: 12/18/21 0220    Lab Status: Final result Specimen: Swab from Nasopharynx Updated: 12/18/21 0253    Narrative:      The following orders were created for panel order COVID PRE-OP / PRE-PROCEDURE SCREENING ORDER (NO ISOLATION) - Swab, Nasopharynx.  Procedure                               Abnormality         Status                     ---------                               -----------         ------                     COVID-19, ABBOTT IN-HOUS...[[id number]]  Normal              Final result                 Please view results for these tests on the individual orders.    COVID-19, ABBOTT IN-HOUSE,NASAL Swab (NO TRANSPORT MEDIA) 2 HR TAT - Swab, Nasopharynx [[id number]]  (Normal) Collected: 12/18/21 0220    Lab Status: Final result Specimen: Swab from Nasopharynx Updated: 12/18/21 0253     COVID19 Presumptive Negative    Narrative:      Fact sheet for providers: https://www.fda.gov/media/071335/download     Fact sheet for patients: https://www.fda.gov/media/875035/download    Test performed by PCR.  If inconsistent with clinical signs and symptoms patient should be tested with different authorized molecular test.    Respiratory Panel PCR w/COVID-19(SARS-CoV-2) JANIS/KALE/GEOVANI/PAD/COR/MAD/MATHIEU In-House, NP Swab in UTM/VTM, 3-4 HR TAT - Swab, Nasopharynx [[id number]]  (Normal) Collected: 12/17/21 2128    Lab Status: Final result Specimen: Swab from Nasopharynx Updated: 12/17/21 2230     ADENOVIRUS, PCR Not Detected     Coronavirus 229E Not Detected     Coronavirus HKU1 Not Detected     Coronavirus NL63 Not Detected     Coronavirus OC43 Not Detected     COVID19 Not Detected     Human Metapneumovirus Not Detected     Human  Rhinovirus/Enterovirus Not Detected     Influenza A PCR Not Detected     Influenza B PCR Not Detected     Parainfluenza Virus 1 Not Detected     Parainfluenza Virus 2 Not Detected     Parainfluenza Virus 3 Not Detected     Parainfluenza Virus 4 Not Detected     RSV, PCR Not Detected     Bordetella pertussis pcr Not Detected     Bordetella parapertussis PCR Not Detected     Chlamydophila pneumoniae PCR Not Detected     Mycoplasma pneumo by PCR Not Detected    Narrative:      In the setting of a positive respiratory panel with a viral infection PLUS a negative procalcitonin without other underlying concern for bacterial infection, consider observing off antibiotics or discontinuation of antibiotics and continue supportive care. If the respiratory panel is positive for atypical bacterial infection (Bordetella pertussis, Chlamydophila pneumoniae, or Mycoplasma pneumoniae), consider antibiotic de-escalation to target atypical bacterial infection.          CT Angiogram Chest With & Without Contrast    Result Date: 12/18/2021  CT ANGIOGRAM CHEST W WO CONTRAST INDICATION: Fever tonight TECHNIQUE: CT angiogram of the chest with IV contrast. 3-D reconstructions were obtained and reviewed.   Radiation dose reduction techniques included automated exposure control or exposure modulation based on body size. Count of known CT and cardiac nuc med studies performed in previous 12 months: 1. COMPARISON: [place] [facility] FINDINGS: Tracheostomy tube is in good position. Left lung is clear. There is atelectasis of the right lower lobe. Aorta is normal in appearance. There is adequate opacification of the pulmonary arteries. There is no CT evidence of pulmonary embolus. There is partial atelectasis the right middle lobe. Bones are unremarkable.     Impression: No CT evidence of pulmonary embolus Atelectasis of most of the right lower lobe and a small amount of the right middle lobe Otherwise negative Signer Name: Khris Cole MD  Signed:  12/18/2021 12:03 AM  Workstation Name: [email or web address]  [facility]    XR Chest 1 View    Result Date: 12/17/2021  CR Chest 1 Vw INDICATION: Shortness of air and fever today. COMPARISON:  Chest 10/28/2021 FINDINGS: Portable AP view(s) of the chest.  There are hazy opacities throughout the right upper lobe and right lung base. Chronic elevation of the right hemidiaphragm. Left lung appears clear. Heart size is upper limits. Mediastinum is unremarkable. Tracheostomy tube again noted.     Impression: Hazy opacities in the right upper lobe and right lung base. Pneumonia is not excluded in the appropriate clinical setting. Signer Name: Keith Dykes MD  Signed: 12/17/2021 9:25 PM  Workstation Name: [email or web address]  [facility]          I have reviewed the medications:  Scheduled Meds:[START ON 12/19/2021] Pharmacy Consult, , Does not apply, Once  insulin lispro, 0-7 Units, Subcutaneous, TID AC  levETIRAcetam, 500 mg, Intravenous, Q12H  meropenem, 1 g, Intravenous, Q8H  vancomycin, 15 mg/kg, Intravenous, Q12H      Continuous Infusions:Pharmacy to dose vancomycin,       PRN Meds:.dextrose  •  dextrose  •  glucagon (human recombinant)  •  ipratropium-albuterol  •  Pharmacy to dose vancomycin  •  sodium chloride    Assessment/Plan   Assessment & Plan     Active Hospital Problems    Diagnosis  POA   • **Sepsis ([facility]) [A41.9]  Yes   • COPD (chronic obstructive pulmonary disease) ([facility]) [J44.9]  Yes   • GERD without esophagitis [K21.9]  Yes   • Epilepsy ([facility]) [G40.909]  Yes   • Schizoaffective disorder ([facility]) [F25.9]  Yes   • DM (diabetes mellitus) ([facility]) [E11.9]  Yes   • Pneumonia of right upper lobe due to infectious organism [J18.9]  Yes   • History of anoxic brain injury [Z86.69]  Not Applicable   • Essential hypertension [I10]  Yes   • Hypothyroidism [E03.9]  Yes   • Cerebral palsy ([facility]) [G80.9]  Yes      Resolved Hospital Problems   No resolved problems to display.        Brief  Hospital Course to date:  Yulisa Valdez is a 61 y.o. female with PMH of chronic respiratory failure with COPD, cerebral palsy with epilepsy, anoxic brain injury, DM2, hypothyroidism, GERD, and tracheostomy and PEG tube placement. She [facility] from [facility] in acute respiratory distress with fever. Records reviewed indicate that patient has recurrent aspiration pneumonia for which she was recently admitted to [facility] in October. She was then transferred to LTAC. She is a [facility]hon of the [facility] and is DNR/DNI.     Sepsis  Aspiration Pneumonia  Acute on Chronic Respiratory Failure  COPD   -Respiratory panel negative  -blood cultures pending  -merrem and vanc- mrsa pcr pending   -sputum culture pending  -urine ag pending   -robinul prn   -duo neb  -solumedrol 80mg x1   -trach care  -bnp is 2686    DM II  -SSI  -a1c 6.5    Seizure disorder  Hx CP with epilepsy and anoxic brain injury  -cont keppra    HTN  -home meds, includes lasix     GOC  -palliative following       DVT prophylaxis:  No DVT prophylaxis order currently exists.       AM-PAC 6 Clicks Score (PT): 6 (12/18/21 0330)    Disposition: I expect the patient to be discharged TBD    CODE STATUS:   Code Status and Medical Interventions:   Ordered at: 12/17/21 5801     Medical Intervention Limits:    NO intubation (DNI)     Level Of Support Discussed With:    Health Care Surrogate     Code Status (Patient has no pulse and is not breathing):    No CPR (Do Not Attempt to Resuscitate)     Medical Interventions (Patient has pulse or is breathing):    Limited Support       Paola Osborn, DO  12/18/21

## 2021-12-19 NOTE — NURSING NOTE
Liliana with [facility] topper ordered for low holley, chronic contracted state. WOC will sign off, please re-consult for new breakdown.

## 2021-12-19 NOTE — PLAN OF CARE
Goal Outcome Evaluation:  Plan of Care Reviewed With: patient           Outcome Summary: PT initial evaluation completed for pt presenting with significant weakness, contractures, increased SOA, and decreased functional mobility. All EOB/OOB mobility deferred. Pt requires total assistance for bed mobility. At this time pt does not present with deficits requiring PT skilled care. Will D/C current orders. Recommend D/C to LTACH.

## 2021-12-19 NOTE — THERAPY DISCHARGE NOTE
Patient Name: Yulisa Valdez  : 1960    MRN: [id number]                              Today's Date: 2021       Admit Date: 2021    Visit Dx:     ICD-10-CM ICD-9-CM   1. Sepsis, due to unspecified organism, unspecified whether acute organ dysfunction present ([facility])  A41.9 038.9     995.91     Patient Active Problem List   Diagnosis   • Sepsis (HCC)   • COPD (chronic obstructive pulmonary disease) (HCC)   • GERD without esophagitis   • Epilepsy (HCC)   • Schizoaffective disorder (HCC)   • DM (diabetes mellitus) ([facility])   • Pneumonia of right upper lobe due to infectious organism   • History of anoxic brain injury   • Essential hypertension   • Hypothyroidism   • Cerebral palsy (HCC)     Past Medical History:   Diagnosis Date   • COPD (chronic obstructive pulmonary disease) (HCC)    • Diabetes (HCC)    • Epilepsy (HCC)    • GERD (gastroesophageal reflux disease)    • Osteoporosis    • Schizoaffective disorder (HCC)    • Seizures ([facility])      Past Surgical History:   Procedure Laterality Date   • TRACHEOSTOMY        General Information     Row Name 21 1145          Physical Therapy Time and Intention    Document Type discharge evaluation/summary  -KG     Mode of Treatment physical therapy  -KG     Row Name 21 1145          General Information    Patient Profile Reviewed yes  -KG     Prior Level of Function --  pt is non-verbal; unable to answer; TBA later  -KG     Existing Precautions/Restrictions fall; oxygen therapy device and L/min; other (see comments)  trach; PEG; cristin UE and LE contractures; non-verbal  -KG     Barriers to Rehab medically complex; previous functional deficit; cognitive status; contractures  -KG     Row Name 21 1145          Living Environment    Lives With --  pt is poor historian; TBA later  -KG     Row Name 21 1145          Home Main Entrance    Number of Stairs, Main Entrance --  pt is poor historian; TBA later  -KG     Row Name 21 1142           Cognition    Orientation Status (Cognition) unable/difficult to assess  -KG     Row Name 12/19/21 1145          Safety Issues, Functional Mobility    Impairments Affecting Function (Mobility) cognition; motor control; motor planning; muscle tone abnormal; postural/trunk control; range of motion (ROM); shortness of breath; strength  -KG           User Key  (r) = Recorded By, (t) = Taken By, (c) = Cosigned By    Initials Name Provider Type    Kiki Palomino PT Physical Therapist               Mobility     Row Name 12/19/21 1147          Bed Mobility    Comment (Bed Mobility) All EOB/OOB mobility deferred. Pt unable to actively participate. Pt requires total assistance for bed mobility.  -KG     Row Name 12/19/21 1147          Transfers    Comment (Transfers) All OOB mobility deferred. Not appropriate to assess.  -KG     Row Name 12/19/21 1147          Bed-Chair Transfer    Bed-Chair [place] (Transfers) unable to assess  -KG     Row Name 12/19/21 1147          Sit-Stand Transfer    Sit-Stand [place] (Transfers) unable to assess  -KG     Row Name 12/19/21 1147          Gait/Stairs (Locomotion)    [place] Level (Gait) unable to assess  -KG     Comment (Gait/Stairs) Ambulation deferred. Not appropriate to assess.  -KG           User Key  (r) = Recorded By, (t) = Taken By, (c) = Cosigned By    Initials Name Provider Type    Kiki Palomino PT Physical Therapist               Obj/Interventions     Row Name 12/19/21 1148          Range of Motion Comprehensive    Comment, General Range of Motion BLEs significantly limited by contractures; pt in significant hip and knee flexion; unable to move pt into any hip extension; ~5-8 degrees of knee flex/ext; cristin ankle DF limited 75%  -KG     Row Name 12/19/21 1148          Strength Comprehensive (MMT)    Comment, General Manual Muscle Testing (MMT) Assessment unable to formally assess  -KG     Row Name 12/19/21 1148          Sensory Assessment  (Somatosensory)    Sensory Assessment (Somatosensory) unable/difficult to assess  -KG           User Key  (r) = Recorded By, (t) = Taken By, (c) = Cosigned By    Initials Name Provider Type    KG Kiki Skinner, PT Physical Therapist               Goals/Plan    No documentation.                Clinical Impression     Row Name 12/19/21 1150          Pain    Additional Documentation Pain Scale: FACES Pre/Post-Treatment (Group)  -KG     Row Name 12/19/21 1150          Pain Scale: FACES Pre/Post-Treatment    Pain: FACES Scale, Pretreatment 0-->no hurt  -KG     Posttreatment Pain Rating 0-->no hurt  -KG     Row Name 12/19/21 1150          Plan of Care Review    Plan of Care Reviewed With patient  -KG     Outcome Summary PT initial evaluation completed for pt presenting with significant weakness, contractures, increased SOA, and decreased functional mobility. All EOB/OOB mobility deferred. Pt requires total assistance for bed mobility. At this time pt does not present with deficits requiring PT skilled care. Will D/C current orders. Recommend D/C to LTACH.  -KG     Row Name 12/19/21 1150          Therapy Assessment/Plan (PT)    Patient/Family Therapy Goals Statement (PT) pt unable to state  -KG     Criteria for Skilled Interventions Met (PT) no; does not meet criteria for skilled intervention  -KG     Row Name 12/19/21 1150          Vital Signs    Pre Systolic BP Rehab 101  -KG     Pre Treatment Diastolic BP 61  -KG     Pretreatment Heart Rate (beats/min) 89  -KG     Posttreatment Heart Rate (beats/min) 83  -KG     Pre SpO2 (%) 98  -KG     O2 Delivery Pre Treatment supplemental O2  -KG     Post SpO2 (%) 99  -KG     O2 Delivery Post Treatment supplemental O2  -KG     Pre Patient Position Supine  -KG     Intra Patient Position Supine  -KG     Post Patient Position Supine  -KG     Row Name 12/19/21 1150          Positioning and Restraints    Pre-Treatment Position in bed  -KG     Post Treatment Position bed  -KG      In Bed supine; call light within reach; encouraged to call for assist; exit alarm on; with nsg; side rails up x3  -KG           User Key  (r) = Recorded By, (t) = Taken By, (c) = Cosigned By    Initials Name Provider Type    Kiki Palomino PT Physical Therapist               Outcome Measures     Row Name 12/19/21 1153          How much help from another person do you currently need...    Turning from your back to your side while in flat bed without using bedrails? 1  -KG     Moving from lying on back to sitting on the side of a flat bed without bedrails? 1  -KG     Moving to and from a bed to a chair (including a wheelchair)? 1  -KG     Standing up from a chair using your arms (e.g., wheelchair, bedside chair)? 1  -KG     Climbing 3-5 steps with a railing? 1  -KG     To walk in hospital room? 1  -KG     AM-PAC 6 Clicks Score (PT) 6  -KG     Row Name 12/19/21 1153          Functional Assessment    Outcome Measure Options AM-PAC 6 Clicks Basic Mobility (PT)  -KG           User Key  (r) = Recorded By, (t) = Taken By, (c) = Cosigned By    Initials Name Provider Type    Kiki Palomino PT Physical Therapist              Physical Therapy Education                 Title: PT OT SLP Therapies (In Progress)     Topic: Physical Therapy (In Progress)     Point: Mobility training (In Progress)     Learning Progress Summary           Patient Acceptance, E, NR by KG at 12/19/2021 0938                   Point: Home exercise program (In Progress)     Learning Progress Summary           Patient Acceptance, E, NR by KG at 12/19/2021 0938                   Point: Body mechanics (In Progress)     Learning Progress Summary           Patient Acceptance, E, NR by KG at 12/19/2021 0938                   Point: Precautions (In Progress)     Learning Progress Summary           Patient Acceptance, E, NR by KG at 12/19/2021 0938                               User Key     Initials Effective Dates Name Provider Type  Discipline    KG 05/22/20 -  Kiki Skinner, PT Physical Therapist PT              PT Recommendation and Plan     Plan of Care Reviewed With: patient  Outcome Summary: PT initial evaluation completed for pt presenting with significant weakness, contractures, increased SOA, and decreased functional mobility. All EOB/OOB mobility deferred. Pt requires total assistance for bed mobility. At this time pt does not present with deficits requiring PT skilled care. Will D/C current orders. Recommend D/C to LTACH.     Time Calculation:    PT Charges     Row Name 12/19/21 0938             Time Calculation    Start Time 0938  -KG      PT Received On 12/19/21  -KG              Untimed Charges    PT Eval/Re-eval Minutes 31  -KG              Total Minutes    Untimed Charges Total Minutes 31  -KG       Total Minutes 31  -KG            User Key  (r) = Recorded By, (t) = Taken By, (c) = Cosigned By    Initials Name Provider Type    KG Kiki Skinner, PT Physical Therapist              Therapy Charges for Today     Code Description Service Date Service Provider Modifiers Qty    [id number] HC PT EVAL LOW COMPLEXITY 3 12/19/2021 Kiki Skinner, PT GP 1          PT G-Codes  Outcome Measure Options: AM-PAC 6 Clicks Basic Mobility (PT)  AM-PAC 6 Clicks Score (PT): 6    PT Discharge Summary  Anticipated Discharge Disposition (PT): long-term acute care facility  Reason for Discharge: At baseline function  Outcomes Achieved: Refer to plan of care for updates on goals achieved (pt requires total assistance with care; contractures at baseline)    Jennifer Skinner PT  12/19/2021

## 2021-12-19 NOTE — PROGRESS NOTES
[facility] Medicine Services  PROGRESS NOTE    Patient Name: Yulisa Valdez  : 1960  MRN: [id number]    Date of Admission: 2021  Primary Care Physician: Ester Moya MD    Subjective   Subjective     CC:  F/u respiratory failure     HPI:  No acute events overnight     ROS:  Unable to assess     Objective   Objective     Vital Signs:   Temp:  [97.6 °F (36.4 °C)-98.1 °F (36.7 °C)] 98 °F (36.7 °C)  Heart Rate:  [66-86] 82  Resp:  [18-22] 18  BP: ()/(50-78) 101/61  Flow (L/min):  [4] 4     Physical Exam:  Constitutional: No acute distress, chronically ill appearing female   HENT: NCAT, mucous membranes moist, trach without discharge   Respiratory: crackles bilaterally , respiratory effort normal   Cardiovascular: RRR, no murmurs, rubs, or gallops  Gastrointestinal: Positive bowel sounds, soft, nontender, nondistended  Musculoskeletal: No bilateral ankle edema, contracture of extremities   Neurologic: Opens eyes to stimuli   Skin: No rashes      Results Reviewed:  LAB RESULTS:      Lab 21  1122 21  0459   WBC 8.22  --  16.53* 6.20   HEMOGLOBIN 11.5*  --  14.1 11.3*   HEMATOCRIT 37.2  --  44.6 37.4   PLATELETS 216  --  256 256   NEUTROS ABS  --   --  12.71* 2.98   IMMATURE GRANS (ABS)  --   --  0.07* 0.02   LYMPHS ABS  --   --  2.52 2.15   MONOS ABS  --   --  1.12* 0.62   EOS ABS  --   --  0.04 0.38   MCV 95.6  --  93.1 95.4   CRP  --   --   --  1.52*   PROCALCITONIN  --  0.17  --   --    LACTATE  --   --  1.4  --          Lab 21  1115 21  1043 21  0459   SODIUM 143 143 138  --  141   POTASSIUM 3.6 3.6 3.4*  --  3.6   CHLORIDE 108* 108* 101  --  105   CO2 27.0 26.0 25.0  --  26.9   ANION GAP 8.0 9.0 12.0  --  9.1   BUN 15 15 14  --  12   CREATININE 0.44* 0.48* 0.71  --  0.48*   GLUCOSE 268* 265* 219*  --  170*   CALCIUM 9.1 9.1 9.3  --  9.1   MAGNESIUM  --   --  2.4  --   --     HEMOGLOBIN A1C  --   --   --  6.50*  --          Lab 12/18/21  1115 12/17/21 2048 12/13/21  0459   TOTAL PROTEIN 6.3 7.5 6.4   ALBUMIN 2.60* 3.10* 2.74*   GLOBULIN 3.7 4.4 3.7   ALT (SGPT) 24 34* 29   AST (SGOT) 11 17 17   BILIRUBIN 0.2 0.5 <0.2   ALK PHOS 122* 170* 146*         Lab 12/17/21 2048   PROBNP 2,686.0*   TROPONIN T 0.019                 Brief Urine Lab Results  (Last result in the past 365 days)      Color   Clarity   Blood   Leuk Est   Nitrite   Protein   CREAT   Urine HCG        12/18/21 1253 Yellow   Clear   Moderate (2+)   Moderate (2+)   Negative   Negative                 Microbiology Results Abnormal     Procedure Component Value - Date/Time    Urine Culture - Urine, Urine, Clean Catch [[id number]] Collected: 12/18/21 1253    Lab Status: Final result Specimen: Urine, Clean Catch Updated: 12/19/21 0727     Urine Culture 25,000 CFU/mL Normal Urogenital Lindsey    Blood Culture - Blood, Arm, Left [[id number]]  (Normal) Collected: 12/17/21 2120    Lab Status: Preliminary result Specimen: Blood from Arm, Left Updated: 12/19/21 0645     Blood Culture No growth at 24 hours    Blood Culture - Blood, Hand, Right [[id number]]  (Normal) Collected: 12/17/21 2120    Lab Status: Preliminary result Specimen: Blood from Hand, Right Updated: 12/19/21 0645     Blood Culture No growth at 24 hours    S. Pneumo Ag Urine or CSF - Urine, Urine, Clean Catch [[id number]]  (Normal) Collected: 12/18/21 1253    Lab Status: Final result Specimen: Urine, Clean Catch Updated: 12/18/21 2148     Strep Pneumo Ag Negative    Legionella Antigen, Urine - Urine, Urine, Clean Catch [[id number]]  (Normal) Collected: 12/18/21 1253    Lab Status: Final result Specimen: Urine, Clean Catch Updated: 12/18/21 2148     LEGIONELLA ANTIGEN, URINE Negative    MRSA Screen, PCR (Inpatient) - Swab, Nares [[id number]]  (Normal) Collected: 12/18/21 1830    Lab Status: Final result Specimen: Swab from Nares Updated: 12/18/21 2011     MRSA PCR Negative     Narrative:      MRSA Negative    Respiratory Culture - Sputum, Cough [[id number]] Collected: 12/18/21 1253    Lab Status: Preliminary result Specimen: Sputum from Cough Updated: 12/18/21 1412     Gram Stain Many (4+) WBCs per low power field      Few (2+) Epithelial cells per low power field      No organisms seen    COVID PRE-OP / PRE-PROCEDURE SCREENING ORDER (NO ISOLATION) - Swab, Nasopharynx [[id number]]  (Normal) Collected: 12/18/21 0220    Lab Status: Final result Specimen: Swab from Nasopharynx Updated: 12/18/21 0253    Narrative:      The following orders were created for panel order COVID PRE-OP / PRE-PROCEDURE SCREENING ORDER (NO ISOLATION) - Swab, Nasopharynx.  Procedure                               Abnormality         Status                     ---------                               -----------         ------                     COVID-19, ABBOTT IN-HOUS...[[id number]]  Normal              Final result                 Please view results for these tests on the individual orders.    COVID-19, ABBOTT IN-HOUSE,NASAL Swab (NO TRANSPORT MEDIA) 2 HR TAT - Swab, Nasopharynx [[id number]]  (Normal) Collected: 12/18/21 0220    Lab Status: Final result Specimen: Swab from Nasopharynx Updated: 12/18/21 0253     COVID19 Presumptive Negative    Narrative:      Fact sheet for providers: https://www.fda.gov/media/084489/download     Fact sheet for patients: https://www.fda.gov/media/658252/download    Test performed by PCR.  If inconsistent with clinical signs and symptoms patient should be tested with different authorized molecular test.    Respiratory Panel PCR w/COVID-19(SARS-CoV-2) JNAIS/KALE/GEOVANI/PAD/COR/MAD/MATHIEU In-House, NP Swab in UTM/VTM, 3-4 HR TAT - Swab, Nasopharynx [[id number]]  (Normal) Collected: 12/17/21 2128    Lab Status: Final result Specimen: Swab from Nasopharynx Updated: 12/17/21 2230     ADENOVIRUS, PCR Not Detected     Coronavirus 229E Not Detected     Coronavirus HKU1 Not Detected     Coronavirus NL63  Not Detected     Coronavirus OC43 Not Detected     COVID19 Not Detected     Human Metapneumovirus Not Detected     Human Rhinovirus/Enterovirus Not Detected     Influenza A PCR Not Detected     Influenza B PCR Not Detected     Parainfluenza Virus 1 Not Detected     Parainfluenza Virus 2 Not Detected     Parainfluenza Virus 3 Not Detected     Parainfluenza Virus 4 Not Detected     RSV, PCR Not Detected     Bordetella pertussis pcr Not Detected     Bordetella parapertussis PCR Not Detected     Chlamydophila pneumoniae PCR Not Detected     Mycoplasma pneumo by PCR Not Detected    Narrative:      In the setting of a positive respiratory panel with a viral infection PLUS a negative procalcitonin without other underlying concern for bacterial infection, consider observing off antibiotics or discontinuation of antibiotics and continue supportive care. If the respiratory panel is positive for atypical bacterial infection (Bordetella pertussis, Chlamydophila pneumoniae, or Mycoplasma pneumoniae), consider antibiotic de-escalation to target atypical bacterial infection.          Adult Transthoracic Echo Complete W/ Cont if Necessary Per Protocol    Result Date: 12/18/2021  · The study is technically difficult for diagnosis. · Left ventricular ejection fraction appears to be 56 - 60%. Left ventricular systolic function is normal. · Left ventricular wall thickness is consistent with mild to moderate concentric hypertrophy.      CT Angiogram Chest With & Without Contrast    Result Date: 12/18/2021  CT ANGIOGRAM CHEST W WO CONTRAST INDICATION: Fever tonight TECHNIQUE: CT angiogram of the chest with IV contrast. 3-D reconstructions were obtained and reviewed.   Radiation dose reduction techniques included automated exposure control or exposure modulation based on body size. Count of known CT and cardiac nuc med studies performed in previous 12 months: 1. COMPARISON: [facility] FINDINGS: Tracheostomy tube is in good position. Left  lung is clear. There is atelectasis of the right lower lobe. Aorta is normal in appearance. There is adequate opacification of the pulmonary arteries. There is no CT evidence of pulmonary embolus. There is partial atelectasis the right middle lobe. Bones are unremarkable.     Impression: No CT evidence of pulmonary embolus Atelectasis of most of the right lower lobe and a small amount of the right middle lobe Otherwise negative Signer Name: Khris Cole MD  Signed: 12/18/2021 12:03 AM  Workstation Name: [id number]  [facility]    XR Chest 1 View    Result Date: 12/17/2021  CR Chest 1 Vw INDICATION: Shortness of air and fever today. COMPARISON:  Chest 10/28/2021 FINDINGS: Portable AP view(s) of the chest.  There are hazy opacities throughout the right upper lobe and right lung base. Chronic elevation of the right hemidiaphragm. Left lung appears clear. Heart size is upper limits. Mediastinum is unremarkable. Tracheostomy tube again noted.     Impression: Hazy opacities in the right upper lobe and right lung base. Pneumonia is not excluded in the appropriate clinical setting. Signer Name: Keith Dykes MD  Signed: 12/17/2021 9:25 PM  Workstation Name: [id number]  [facility]      Results for orders placed during the hospital encounter of 12/17/21    Adult Transthoracic Echo Complete W/ Cont if Necessary Per Protocol    Interpretation Summary  · The study is technically difficult for diagnosis.  · Left ventricular ejection fraction appears to be 56 - 60%. Left ventricular systolic function is normal.  · Left ventricular wall thickness is consistent with mild to moderate concentric hypertrophy.      I have reviewed the medications:  Scheduled Meds:amLODIPine, 10 mg, Per PEG Tube, Daily  aspirin, 325 mg, Per PEG Tube, Daily  budesonide, 0.5 mg, Nebulization, Daily - RT  furosemide, 20 mg, Per PEG Tube, BID  glycopyrrolate, 1 mg, Per PEG Tube, TID  heparin (porcine), 5,000  Units, Subcutaneous, Q12H  insulin regular, 0-7 Units, Subcutaneous, Q6H  lactobacillus acidophilus, 1 capsule, Per PEG Tube, Daily  levETIRAcetam, 500 mg, Intravenous, Q12H  meropenem, 1 g, Intravenous, Q8H  metoclopramide, 5 mg, Per PEG Tube, 4x Daily AC & at Bedtime  metoprolol tartrate, 50 mg, Per PEG Tube, Q12H  multivitamin, 1 tablet, Per G Tube, Daily  PRO-STAT, 30 mL, Oral, Daily  sodium chloride, 10 mL, Intravenous, Q12H      Continuous Infusions:   PRN Meds:.•  acetaminophen  •  dextrose  •  dextrose  •  glucagon (human recombinant)  •  ipratropium-albuterol  •  melatonin  •  Morphine  •  sodium chloride  •  sodium chloride    Assessment/Plan   Assessment & Plan     Active Hospital Problems    Diagnosis  POA   • **Sepsis ([facility]) [A41.9]  Yes   • COPD (chronic obstructive pulmonary disease) ([facility]) [J44.9]  Yes   • GERD without esophagitis [K21.9]  Yes   • Epilepsy ([facility]) [G40.909]  Yes   • Schizoaffective disorder ([facility]) [F25.9]  Yes   • DM (diabetes mellitus) ([facility]) [E11.9]  Yes   • Pneumonia of right upper lobe due to infectious organism [J18.9]  Yes   • History of anoxic brain injury [Z86.69]  Not Applicable   • Essential hypertension [I10]  Yes   • Hypothyroidism [E03.9]  Yes   • Cerebral palsy ([facility]) [G80.9]  Yes      Resolved Hospital Problems   No resolved problems to display.        Brief Hospital Course to date:  Yulisa Valdez is a 61 y.o. female with PMH of chronic respiratory failure with COPD, cerebral palsy with epilepsy, anoxic brain injury, DM2, hypothyroidism, GERD, and tracheostomy and PEG tube placement. She presented from [facility] in acute respiratory distress with fever. Records reviewed indicate that patient has recurrent aspiration pneumonia for which she was recently admitted to [facility] in October. She was then transferred to LTAC. She is a mcmahon of the [facility] and is DNR/DNI.     Sepsis  Aspiration Pneumonia  Acute on Chronic Respiratory Failure  COPD    -Respiratory panel negative  -blood cultures pending  -merrem and vanc- mrsa pcr pending   -sputum culture pending  -urine ag pending   -robinul prn   -duo neb  -solumedrol 40mg BID    -[facility] care  -bnp is 2686    DM II  -SSI  -a1c 6.5    Seizure disorder  Hx CP with epilepsy and anoxic brain injury  -cont keppra    HTN  -home meds, includes lasix     GOC  -palliative following       DVT prophylaxis:  Medical DVT prophylaxis orders are present.       AM-PAC 6 Clicks Score (PT): 6 (12/18/21 0330)    Disposition: I expect the patient to be discharged TBD    CODE STATUS:   Code Status and Medical Interventions:   Ordered at: 12/17/21 9912     Medical Intervention Limits:    NO intubation (DNI)     Level Of Support Discussed With:    Health Care Surrogate     Code Status (Patient has no pulse and is not breathing):    No CPR (Do Not Attempt to Resuscitate)     Medical Interventions (Patient has pulse or is breathing):    Limited Support       Paola Osborn, DO  12/19/21

## 2021-12-19 NOTE — PROGRESS NOTES
Nurse reports secretions are to thick to suction and she is having to pull them out of the trach. Discontinued robinul and added PRN guaifenesin. Please call for additional needs. Palliative will F/u on Monday with guardian in regards to transitioning to comfort measures.

## 2021-12-20 NOTE — PROGRESS NOTES
"Palliative Care Consult Note    Patient Name: Yulisa Valdez   : 1960  Sex: female    Date of Admission: 2021    Subjective: pt is resting in bed, nonverbal, does open her eyes to verbal stimuli, does not follow simple commands, trache to TCO2 with thin secretions, soto cath BSD. pt has severe contractures all extremities.     Review of Systems   Unable to perform ROS: Patient nonverbal     Reviewed current scheduled and prn medications for route, type, dose and frequency.     •  acetaminophen  •  dextrose  •  dextrose  •  glucagon (human recombinant)  •  guaiFENesin  •  ipratropium-albuterol  •  melatonin  •  Morphine  •  sodium chloride  •  sodium chloride    Objective:   /49 (BP Location: Right arm, Patient Position: Lying)   Pulse 60   Temp 98.4 °F (36.9 °C) (Axillary)   Resp 16   Ht 165 cm (64.96\")   Wt 73.9 kg (163 lb)   SpO2 100%   BMI 27.16 kg/m²    Intake & Output (last day)        07    P.O.      Other      Total Intake(mL/kg)      Urine (mL/kg/hr)      Total Output      Net            Stool Unmeasured Occurrence 1 x         Lab Results (last 24 hours)     Procedure Component Value Units Date/Time    Blood Culture - Blood, Arm, Left [[id number]]  (Normal) Collected: 21    Specimen: Blood from Arm, Left Updated: 21     Blood Culture No growth at 2 days    Blood Culture - Blood, Hand, Right [[id number]]  (Normal) Collected: 21    Specimen: Blood from Hand, Right Updated: 21     Blood Culture No growth at 2 days    POC Glucose Once [[id number]]  (Abnormal) Collected: 21 06    Specimen: Blood Updated: 21 06     Glucose 279 mg/dL      Comment: Meter: [id number] : [id number] Jerry Johnson       POC Glucose Once [[id number]]  (Abnormal) Collected: 21 0143    Specimen: Blood Updated: 21 014     Glucose 274 mg/dL      Comment: Meter: [id number] : [id number] Sd" Desiree       POC Glucose Once [[id number]]  (Abnormal) Collected: 12/19/21 1948    Specimen: Blood Updated: 12/19/21 1950     Glucose 262 mg/dL      Comment: Meter: [id number] : [id number] Jerry Johnson       POC Glucose Once [[id number]]  (Abnormal) Collected: 12/19/21 1808    Specimen: Blood Updated: 12/19/21 1809     Glucose 274 mg/dL      Comment: Meter: [id number] : [id number] Birdbackra       Respiratory Culture - Sputum, Cough [[id number]] Collected: 12/18/21 1253    Specimen: Sputum from Cough Updated: 12/19/21 1203     Respiratory Culture Rare The culture consists of normal respiratory ze. This is a preliminary report; final report to follow.     Gram Stain Many (4+) WBCs per low power field      Few (2+) Epithelial cells per low power field      No organisms seen    POC Glucose Once [[id number]]  (Abnormal) Collected: 12/19/21 1103    Specimen: Blood Updated: 12/19/21 1104     Glucose 194 mg/dL      Comment: Meter: [id number] : [id number] Fady Megha           Imaging Results (Last 24 Hours)     ** No results found for the last 24 hours. **          PPS:   30% based on the following measures:   Ambulation: Totally bed bound  Activity and Evidence of Disease: Unable to do any work, extensive evidence of disease  Self-Care: Total care  Intake: Reduced   LOC: Full, drowsy or confusion    Physical Exam:  Vitals and nursing note reviewed.  General Appearance: chronically ill appearing, nonverbal, opens eyes to verbal stimuli   HEENT: Normocephalic; atraumatic. PERTL, Conjunctivae clear,  mm moist   Neck:   supple; trachea midline with trach to trach collar  Lungs: BBS = with overall coarse wheezes, decreased in bases, intermittent cough with thin tan secretions.  RR 22; O2 sats 94% on TCO2 4L   Heart: RRR, S1S2, peripheral pulses pos, neg for edema   Abdomen: soft, round, NTND, BS +, PEG tube with skin CDI.   Extremities: Severe contractions BUE and BLE extremities; no edema    Skin: Warm  and dry,  protective dressing to BLE heals; no breakdown,   Neurological: nonverbal, does not follow commands.   Psych: calm,       Sepsis (HCC)    COPD (chronic obstructive pulmonary disease) (HCC)    GERD without esophagitis    Epilepsy (HCC)    Schizoaffective disorder (HCC)    DM (diabetes mellitus) (HCC)    Pneumonia of right upper lobe due to infectious organism    History of anoxic brain injury    Essential hypertension    Hypothyroidism    Cerebral palsy (HCC)      Assessment/Plan: 60yo female with sepsis 2/2 PNA- suspected aspiration. continues on IV ABX thru 12/24.  Secretions are less thick today (robinal DCd/PRN guaifenesin started yesterday)      Pain: morphine 4 mg sol q 4 hr PRN  Per PEG;  Tylenol PRN   Dyspnea: pulmicort inhaler, duonebs, morphine was started yesterday for tachypnea, soa, pain   Nausea/Vomiting: reglan 5 mg QID scheduled   Anxiety/Agitation: PRN morphine   Bowel/bladder: f/c PRN bowel meds   Nutrition: TF per PEG   Sleep: PRN melatonin 5 mg q HS  ADLs: total care      Palliative will continue  To seek assist of state guardian with goals of care. Given ongoing respiratory decline and suspected aspiration source of PNA with frequent hospitalizations,  patient would be appropriate for hospice care and transition to comfort focused POC.     Thank you for this consult and allowing us to participate in patient's plan of care. Palliative Care Team will continue to follow patient.     Educated patient/family about using palliative approach with advanced age and multiple chronic disease processes that cannot be reversed.     Total Visit Time:  30   Face to Face Time: 20     Lia Dawson, APRN  [phone]  12/20/21  10:05 EST

## 2021-12-20 NOTE — PLAN OF CARE
Goal Outcome Evaluation:  Patient requiring frequent suctioning from her trach and mouth today. Thick clear sections that at times required using a guaze pad to wipe secretions from trach because they were to thick to suction.  Patient had large loose BM today. Gao catheter remains in place. Patient is contracted in bilateral arms and legs. Transferred to speciality bed today. Held Amlodipine today for low BP. Patient became bradycardic  (46-48 bpm) today.  Arrived in the room for 1600 rounds and observed patient's trach elevated on her chest. Trach ties remained attached. Removed foam trach dressing and observed that patient's entire trach was sitting on her chest.  Contacted Dr. Osborn and updated her about this. Trach successfully placed back into patient's trachea after being cleansed.  Patient tolerated fair. Patient coughed up more thick secretions after the trach was re-inserted.

## 2021-12-20 NOTE — PLAN OF CARE
Goal Outcome Evaluation:  Plan of Care Reviewed With: other (see comments) (nursing)        Progress: no change  Outcome Summary: New palliative consult for assistance with pain management and goals of care discussion.  Pt. sitting up in bed on 4LO2 via trach collar.  Per nursing, pt. having a lot of thick white secretions.  Pt. does demonstrate increased work of breathing especially with turns and repositioning.  Pt. had a large BM and was cleaned and turned.  Extreme caution exercised with turns as trach became dislodged and had to be reinserted overnight.   Pt. nonverbal but did not appear to be in any distress aside from mild dyspnea.  Pt. tray remains untouched this morning.  Pt. was  bradycardic this morning but did not appear symptomatic.  Palliative care to follow for support, symptom management and goals of care.     1300, Palliative team meeting: MELINDA Wren RN, CHPN; JESUS Ferreira, [facility], [facility]; DONNA Barboza RN, CHPN; JESUS Dawson, APRN; ADI Schultz RN, CHPN; KARLOS Berry, ; RENETTA Ernst RN, CHPN

## 2021-12-20 NOTE — PLAN OF CARE
Problem: Adult Inpatient Plan of Care  Goal: Plan of Care Review  Recent Flowsheet Documentation  Taken 12/20/2021 1233 by Beny Timmons OT  Progress: (OT eval) no change  Plan of Care Reviewed With: (RN) other (see comments)  Outcome Summary: OT evaluation completed. Pt presents at baseline, dependent for all ADL completion. Fxl movement patterns severely limitted by BUE/BLE contractures. Provided rollups for palm/skin integrity w/ nursing present. OT signing off, recommend d/c to LTACH vs. Hospice care pending GOC discussion.

## 2021-12-20 NOTE — CASE MANAGEMENT/SOCIAL WORK
Discharge Planning Assessment  [facility]     Patient Name: Yulisa Valdez  MRN: [id number]  Today's Date: 12/20/2021    Admit Date: 12/17/2021     Discharge Needs Assessment     Row Name 12/20/21 1135       Living Environment    Lives With facility resident    Current Living Arrangements residential facility    Primary Care Provided by other (see comments)  Nursing staff    Provides Primary Care For no one, unable/limited ability to care for self    Family Caregiver if Needed none    Quality of Family Relationships unable to assess    Able to Return to Prior Arrangements yes       Transition Planning    Patient/Family Anticipates Transition to long-term care facility    Patient/Family Anticipated Services at Transition ; hospice care; durable medical equipment    Transportation Anticipated health plan transportation       Discharge Needs Assessment    Readmission Within the Last 30 Days no previous admission in last 30 days    Equipment Currently Used at Home colostomy/ostomy supplies; lift device; hospital bed; feeding device; nebulizer; nutrition supplies; oxygen; slide board; sling; respiratory supplies; pulse ox; suction equipment; trach supplies    Concerns to be Addressed discharge planning    Concerns Comments [facility]. Appropriate for comfort measures    Anticipated Changes Related to Illness inability to care for self    Outpatient/Agency/Support Group Needs home hospice    Discharge Facility/Level of Care Needs hospice facility    Current Discharge Risk chronically ill; cognitively impaired; dependent with mobility/activities of daily living               Discharge Plan     Row Name 12/20/21 1140       Plan    Plan Comfort care    Plan Comments   I confirmed Ms. Valdez has a Medicaid bed hold at [facility] for long term care. She is a rodriguez of the [facility]. Palliative care is following and are attempting to transition Ms. Valdez into comfort measures/Hospice care. Case management will  continue to follow Ms. Valdez's progress and provide for her any anticipated discharge needs.      Final Discharge Disposition Code 04 - intermediate care facility              Continued Care and Services - Admitted Since 12/17/2021     Destination Coordination complete.    Service Provider Request Status Selected Services Address Phone Fax Patient Preferred    [facility]   Selected Intermediate Care [address] [place] [id number] [phone] --              Expected Discharge Date and Time     Expected Discharge Date Expected Discharge Time    Dec 23, 2021          Demographic Summary     Row Name 12/20/21 1134       General Information    General Information Comments Medicaid bed hold at [facility]               Functional Status     Row Name 12/20/21 1135       Functional Status    Usual Activity Tolerance poor    Current Activity Tolerance poor       Functional Status, IADL    Medications completely dependent    Meal Preparation completely dependent    Housekeeping completely dependent    Laundry completely dependent    Shopping completely dependent       Mental Status Summary    Recent Changes in Mental Status/Cognitive Functioning no changes       Employment/    Employment Status disabled              Shawna Machuca, RN

## 2021-12-20 NOTE — PROGRESS NOTES
[facility]  PROGRESS NOTE    Patient Name: Yulisa Valdez  : 1960  MRN: [id number]    Date of Admission: 2021  Primary Care Physician: Ester Moya MD    Subjective   Subjective     CC:  F/u respiratory failure     HPI:  No acute events, pt is having slightly more labored breathing.     ROS:  Unable to assess     Objective   Objective     Vital Signs:   Temp:  [98.2 °F (36.8 °C)-98.5 °F (36.9 °C)] 98.4 °F (36.9 °C)  Heart Rate:  [44-81] 51  Resp:  [16-20] 16  BP: (100-169)/(49-89) 137/49  Flow (L/min):  [4-10] 4     Physical Exam:  Constitutional: No acute distress, chronically ill appearing female   HENT: NCAT, mucous membranes moist, trach without discharge   Respiratory: crackles bilaterally, + labored breathing   Cardiovascular: RRR, no murmurs, rubs, or gallops  Gastrointestinal: Positive bowel sounds, soft, nontender, nondistended  Musculoskeletal: No bilateral ankle edema, contracture of extremities   Neurologic: Opens eyes to stimuli   Skin: No rashes      Results Reviewed:  LAB RESULTS:      Lab 21  1122 21   WBC 8.22  --  16.53*   HEMOGLOBIN 11.5*  --  14.1   HEMATOCRIT 37.2  --  44.6   PLATELETS 216  --  256   NEUTROS ABS  --   --  12.71*   IMMATURE GRANS (ABS)  --   --  0.07*   LYMPHS ABS  --   --  2.52   MONOS ABS  --   --  1.12*   EOS ABS  --   --  0.04   MCV 95.6  --  93.1   PROCALCITONIN  --  0.17  --    LACTATE  --   --  1.4         Lab 21  1115 21  1043 21   SODIUM 143 143 138  --    POTASSIUM 3.6 3.6 3.4*  --    CHLORIDE 108* 108* 101  --    CO2 27.0 26.0 25.0  --    ANION GAP 8.0 9.0 12.0  --    BUN 15 15 14  --    CREATININE 0.44* 0.48* 0.71  --    GLUCOSE 268* 265* 219*  --    CALCIUM 9.1 9.1 9.3  --    MAGNESIUM  --   --  2.4  --    HEMOGLOBIN A1C  --   --   --  6.50*         Lab 21  1115 218   TOTAL PROTEIN 6.3 7.5   ALBUMIN 2.60* 3.10*    GLOBULIN 3.7 4.4   ALT (SGPT) 24 34*   AST (SGOT) 11 17   BILIRUBIN 0.2 0.5   ALK PHOS 122* 170*         Lab 12/17/21 2048   PROBNP 2,686.0*   TROPONIN T 0.019                 Brief Urine Lab Results  (Last result in the past 365 days)      Color   Clarity   Blood   Leuk Est   Nitrite   Protein   CREAT   Urine HCG        12/18/21 1253 Yellow   Clear   Moderate (2+)   Moderate (2+)   Negative   Negative                 Microbiology Results Abnormal     Procedure Component Value - Date/Time    Blood Culture - Blood, Arm, Left [[id number]]  (Normal) Collected: 12/17/21 2120    Lab Status: Preliminary result Specimen: Blood from Arm, Left Updated: 12/20/21 0645     Blood Culture No growth at 2 days    Blood Culture - Blood, Hand, Right [[id number]]  (Normal) Collected: 12/17/21 2120    Lab Status: Preliminary result Specimen: Blood from Hand, Right Updated: 12/20/21 0645     Blood Culture No growth at 2 days    Respiratory Culture - Sputum, Cough [[id number]] Collected: 12/18/21 1253    Lab Status: Preliminary result Specimen: Sputum from Cough Updated: 12/19/21 1203     Respiratory Culture Rare The culture consists of normal respiratory ze. This is a preliminary report; final report to follow.     Gram Stain Many (4+) WBCs per low power field      Few (2+) Epithelial cells per low power field      No organisms seen    Urine Culture - Urine, Urine, Clean Catch [[id number]] Collected: 12/18/21 1253    Lab Status: Final result Specimen: Urine, Clean Catch Updated: 12/19/21 0727     Urine Culture 25,000 CFU/mL Normal Urogenital Ze    S. Pneumo Ag Urine or CSF - Urine, Urine, Clean Catch [[id number]]  (Normal) Collected: 12/18/21 1253    Lab Status: Final result Specimen: Urine, Clean Catch Updated: 12/18/21 2148     Strep Pneumo Ag Negative    Legionella Antigen, Urine - Urine, Urine, Clean Catch [[id number]]  (Normal) Collected: 12/18/21 1253    Lab Status: Final result Specimen: Urine, Clean Catch Updated: 12/18/21  2148     LEGIONELLA ANTIGEN, URINE Negative    MRSA Screen, PCR (Inpatient) - Swab, Nares [[id number]]  (Normal) Collected: 12/18/21 1830    Lab Status: Final result Specimen: Swab from Nares Updated: 12/18/21 2011     MRSA PCR Negative    Narrative:      MRSA Negative    COVID PRE-OP / PRE-PROCEDURE SCREENING ORDER (NO ISOLATION) - Swab, Nasopharynx [[id number]]  (Normal) Collected: 12/18/21 0220    Lab Status: Final result Specimen: Swab from Nasopharynx Updated: 12/18/21 0253    Narrative:      The following orders were created for panel order COVID PRE-OP / PRE-PROCEDURE SCREENING ORDER (NO ISOLATION) - Swab, Nasopharynx.  Procedure                               Abnormality         Status                     ---------                               -----------         ------                     COVID-19, ABBOTT IN-HOUS...[[id number]]  Normal              Final result                 Please view results for these tests on the individual orders.    COVID-19, ABBOTT IN-HOUSE,NASAL Swab (NO TRANSPORT MEDIA) 2 HR TAT - Swab, Nasopharynx [[id number]]  (Normal) Collected: 12/18/21 0220    Lab Status: Final result Specimen: Swab from Nasopharynx Updated: 12/18/21 0253     COVID19 Presumptive Negative    Narrative:      Fact sheet for providers: https://www.fda.gov/media/592458/download     Fact sheet for patients: https://www.fda.gov/media/470176/download    Test performed by PCR.  If inconsistent with clinical signs and symptoms patient should be tested with different authorized molecular test.    Respiratory Panel PCR w/COVID-19(SARS-CoV-2) JANIS/KALE/GEOVANI/PAD/COR/MAD/MATHIEU In-House, NP Swab in UTM/VTM, 3-4 HR TAT - Swab, Nasopharynx [[id number]]  (Normal) Collected: 12/17/21 2128    Lab Status: Final result Specimen: Swab from Nasopharynx Updated: 12/17/21 2230     ADENOVIRUS, PCR Not Detected     Coronavirus 229E Not Detected     Coronavirus HKU1 Not Detected     Coronavirus NL63 Not Detected     Coronavirus OC43 Not Detected      COVID19 Not Detected     Human Metapneumovirus Not Detected     Human Rhinovirus/Enterovirus Not Detected     Influenza A PCR Not Detected     Influenza B PCR Not Detected     Parainfluenza Virus 1 Not Detected     Parainfluenza Virus 2 Not Detected     Parainfluenza Virus 3 Not Detected     Parainfluenza Virus 4 Not Detected     RSV, PCR Not Detected     Bordetella pertussis pcr Not Detected     Bordetella parapertussis PCR Not Detected     Chlamydophila pneumoniae PCR Not Detected     Mycoplasma pneumo by PCR Not Detected    Narrative:      In the setting of a positive respiratory panel with a viral infection PLUS a negative procalcitonin without other underlying concern for bacterial infection, consider observing off antibiotics or discontinuation of antibiotics and continue supportive care. If the respiratory panel is positive for atypical bacterial infection (Bordetella pertussis, Chlamydophila pneumoniae, or Mycoplasma pneumoniae), consider antibiotic de-escalation to target atypical bacterial infection.          Adult Transthoracic Echo Complete W/ Cont if Necessary Per Protocol    Result Date: 12/18/2021  · The study is technically difficult for diagnosis. · Left ventricular ejection fraction appears to be 56 - 60%. Left ventricular systolic function is normal. · Left ventricular wall thickness is consistent with mild to moderate concentric hypertrophy.        Results for orders placed during the hospital encounter of 12/17/21    Adult Transthoracic Echo Complete W/ Cont if Necessary Per Protocol    Interpretation Summary  · The study is technically difficult for diagnosis.  · Left ventricular ejection fraction appears to be 56 - 60%. Left ventricular systolic function is normal.  · Left ventricular wall thickness is consistent with mild to moderate concentric hypertrophy.      I have reviewed the medications:  Scheduled Meds:amLODIPine, 10 mg, Per PEG Tube, Daily  aspirin, 325 mg, Per PEG Tube,  Daily  budesonide, 0.5 mg, Nebulization, Daily - RT  furosemide, 20 mg, Per PEG Tube, BID  heparin (porcine), 5,000 Units, Subcutaneous, Q12H  insulin regular, 0-7 Units, Subcutaneous, Q6H  lactobacillus acidophilus, 1 capsule, Per PEG Tube, Daily  levETIRAcetam, 500 mg, Intravenous, Q12H  meropenem, 1 g, Intravenous, Q8H  methylPREDNISolone sodium succinate, 40 mg, Intravenous, Q12H  metoclopramide, 5 mg, Per PEG Tube, 4x Daily AC & at Bedtime  metoprolol tartrate, 50 mg, Per PEG Tube, Q12H  multivitamin, 1 tablet, Per G Tube, Daily  PRO-STAT, 30 mL, Oral, Daily  sodium chloride, 10 mL, Intravenous, Q12H      Continuous Infusions:   PRN Meds:.•  acetaminophen  •  dextrose  •  dextrose  •  glucagon (human recombinant)  •  guaiFENesin  •  ipratropium-albuterol  •  melatonin  •  Morphine  •  sodium chloride  •  sodium chloride    Assessment/Plan   Assessment & Plan     Active Hospital Problems    Diagnosis  POA   • **Sepsis ([facility]) [A41.9]  Yes   • COPD (chronic obstructive pulmonary disease) ([facility]) [J44.9]  Yes   • GERD without esophagitis [K21.9]  Yes   • Epilepsy ([facility]) [G40.909]  Yes   • Schizoaffective disorder ([facility]) [F25.9]  Yes   • DM (diabetes mellitus) ([facility]) [E11.9]  Yes   • Pneumonia of right upper lobe due to infectious organism [J18.9]  Yes   • History of anoxic brain injury [Z86.69]  Not Applicable   • Essential hypertension [I10]  Yes   • Hypothyroidism [E03.9]  Yes   • Cerebral palsy ([facility]) [G80.9]  Yes      Resolved Hospital Problems   No resolved problems to display.        Brief Hospital Course to date:  Yulisa Valdez is a 61 y.o. female with PMH of chronic respiratory failure with COPD, cerebral palsy with epilepsy, anoxic brain injury, DM2, hypothyroidism, GERD, and tracheostomy and PEG tube placement. She presented from [facility] in acute respiratory distress with fever. Records reviewed indicate that patient has recurrent aspiration pneumonia for which she was recently [facility] to  [facility] in October. She was then transferred to LT[facility]. She is a mcmahon of the [facility] and is DNR/DNI.     Sepsis  Aspiration Pneumonia  Acute on Chronic Respiratory Failure  COPD   -Respiratory panel negative  -blood cultures NG at 2 days   -merrem and vanc- mrsa pcr pending   -sputum culture normal ze  -urine ag neg    -duo neb  -solumedrol 40mg BID    -trach care    DM II  -SSI  -a1c 6.5    Seizure disorder  Hx CP with epilepsy and anoxic brain injury  -cont keppra    HTN  -home meds, includes lasix     GOC  Patient's medical status appears to not be improving despite medical treatment. A comfort care goal should be considered.       DVT prophylaxis:  Medical DVT prophylaxis orders are present.       AM-PAC 6 Clicks Score (PT): 6 (12/20/21 0800)    Disposition: I expect the patient to be discharged TBD    CODE STATUS:   Code Status and Medical Interventions:   Ordered at: 12/17/21 7423     Medical Intervention Limits:    NO intubation (DNI)     Level Of Support Discussed With:    Health Care Surrogate     Code Status (Patient has no pulse and is not breathing):    No CPR (Do Not Attempt to Resuscitate)     Medical Interventions (Patient has pulse or is breathing):    Limited Support       Paola Osborn, DO  12/20/21

## 2021-12-20 NOTE — PLAN OF CARE
Goal Outcome Evaluation:   Bradycardic throughout shift, provider notified. Bp remained stable. Pt required frequent suctioning during shift.

## 2021-12-20 NOTE — THERAPY DISCHARGE NOTE
Acute Care - Occupational Therapy Discharge  [facility]    Patient Name: Yulisa Valdez  : 1960    MRN: [id number]                              Today's Date: 2021       Admit Date: 2021    Visit Dx:     ICD-10-CM ICD-9-CM   1. Sepsis, due to unspecified organism, unspecified whether acute organ dysfunction present ([facility])  A41.9 038.9     995.91     Patient Active Problem List   Diagnosis   • Sepsis (HCC)   • COPD (chronic obstructive pulmonary disease) (HCC)   • GERD without esophagitis   • Epilepsy (HCC)   • Schizoaffective disorder (HCC)   • DM (diabetes mellitus) (HCC)   • Pneumonia of right upper lobe due to infectious organism   • History of anoxic brain injury   • Essential hypertension   • Hypothyroidism   • Cerebral palsy (HCC)     Past Medical History:   Diagnosis Date   • COPD (chronic obstructive pulmonary disease) (HCC)    • Diabetes (HCC)    • Epilepsy (HCC)    • GERD (gastroesophageal reflux disease)    • Osteoporosis    • Schizoaffective disorder (HCC)    • Seizures (HCC)      Past Surgical History:   Procedure Laterality Date   • TRACHEOSTOMY        General Information     Row Name 21 1226          OT Time and Intention    Document Type discharge evaluation/summary  -CS     Mode of Treatment occupational therapy  -CS     Row Name 21 1226          General Information    Patient Profile Reviewed yes  -CS     Prior Level of Function dependent:; ADL's; all household mobility; community mobility; w/c or scooter  Per CM note: Pt dependent for all ADLs at [facility] with [facility]: hospital bed, lift, trach/PEG/ostomy care. Pt is [facility] of the [facility].  -CS     Existing Precautions/Restrictions fall; oxygen therapy device and L/min; other (see comments)  trach/PEG, BUE/BLE contractures, ostomy, nonverbal  -CS     Barriers to Rehab medically complex; previous functional deficit; cognitive status; contractures  -CS     Row Name 21 1226          Living Environment    Lives With  facility resident  [facility] bed  -CS     Row Name 12/20/21 1226          Cognition    Orientation Status (Cognition) unable/difficult to assess; other (see comments)  turned head towards therapist and made eye contact when name called  -CS     Row Name 12/20/21 1226          Safety Issues, Functional Mobility    Safety Issues Affecting Function (Mobility) ability to follow commands  -CS     Impairments Affecting Function (Mobility) cognition; motor control; motor planning; muscle tone abnormal; postural/trunk control; range of motion (ROM); shortness of breath; strength; grasp; balance; coordination  -CS     Cognitive Impairments, Mobility Safety/Performance attention  -CS     Comment, Safety Issues/Impairments (Mobility) non ambulatory at baseline, dependent for all ADLs  -CS           User Key  (r) = Recorded By, (t) = Taken By, (c) = Cosigned By    Initials Name Provider Type    CS Beny Timmons OT Occupational Therapist               Mobility/ADL's     Row Name 12/20/21 1228          Bed Mobility    Bed Mobility scooting/bridging  -CS     Scooting/Bridging [place] (Bed Mobility) dependent (less than 25% patient effort)  -CS     Bed Mobility, Safety Issues cognitive deficits limit understanding; decreased use of arms for pushing/pulling; decreased use of legs for bridging/pushing; impaired trunk control for bed mobility  -CS     Assistive Device (Bed Mobility) draw sheet  -CS     Row Name 12/20/21 1228          Transfers    Comment (Transfers) lift at baseline, non-ambulatory  -CS     Row Name 12/20/21 1228          Activities of Daily Living    BADL Assessment/Intervention grooming; feeding; toileting  -CS     Row Name 12/20/21 1228          Grooming Assessment/Training    [place] Level (Grooming) wash face, hands; dependent (less than 25% patient effort)  -CS     Position (Grooming) supine  -CS     Row Name 12/20/21 1228          Self-Feeding Assessment/Training    Comment (Feeding) PEG tube  feeding, no BUE AROM for fxl self feeding movement pattern  -     Row Name 12/20/21 1228          Toileting Assessment/Training    [place] Level (Toileting) toileting skills; dependent (less than 25% patient effort)  -           User Key  (r) = Recorded By, (t) = Taken By, (c) = Cosigned By    Initials Name Provider Type     Beny Timmons, OT Occupational Therapist               Obj/Interventions     Row Name 12/20/21 1230          Sensory Assessment (Somatosensory)    Sensory Assessment (Somatosensory) unable/difficult to assess  -[facility] Name 12/20/21 1230          Vision Assessment/Intervention    Visual Impairment/Limitations unable/difficult to assess; other (see comments)  limitted command following, able to track therapist intermittently  -     Row Name 12/20/21 1230          Range of Motion Comprehensive    General Range of Motion upper extremity range of motion deficits identified  -CS     Comment, General Range of Motion BUE PROM significantly limitted by contractures - provided rag rollups for palms / skin integrity  -     Row Name 12/20/21 1230          Strength Comprehensive (MMT)    Comment, General Manual Muscle Testing (MMT) Assessment unable to assess d/t contractures and cognitive status  -     Row Name 12/20/21 1230          Motor Skills    Motor Skills coordination; functional endurance  -CS     Coordination bilateral; upper extremity; bimanual skills; severe impairment  -CS     Functional Endurance trach collar, 8L  -CS           User Key  (r) = Recorded By, (t) = Taken By, (c) = Cosigned By    Initials Name Provider Type     Beny Timmons, OT Occupational Therapist               Goals/Plan    No documentation.                Clinical Impression     [facility] Name 12/20/21 1233          Pain Assessment    Additional Documentation Pain Scale: FACES Pre/Post-Treatment (Group)  -[facility] Name 12/20/21 1233          Pain Scale: FACES Pre/Post-Treatment    Pain: FACES Scale,  Pretreatment 0-->no hurt  -CS     Posttreatment Pain Rating 0-->no hurt  -CS     Pre/Posttreatment Pain Comment no verbal/ visual indications of pain during eval  -CS     Row Name 12/20/21 1233          Plan of Care Review    Plan of Care Reviewed With other (see comments)  RN  -CS     Progress no change  OT eval  -CS     Outcome Summary OT evaluation completed. Pt presents at baseline, dependent for all ADL completion. Fxl movement patterns severely limitted by BUE/BLE contractures. Provided rollups for palm/skin integrity w/ nursing present. OT signing off, recommend d/c to LTACH vs. Hospice care pending [facility] discussion.  -CS     Row Name 12/20/21 1233          Therapy Assessment/Plan (OT)    Criteria for Skilled Therapeutic Interventions Met (OT) no; does not meet criteria for skilled intervention  -CS     Therapy Frequency (OT) evaluation only  -CS     Row Name 12/20/21 1233          Therapy Plan Review/Discharge Plan (OT)    Anticipated Discharge Disposition (OT) long-term acute care facility  -CS     Row Name 12/20/21 1233          Vital Signs    Pre Systolic BP Rehab 133  RN cleared for bed level eval  -CS     Pre Treatment Diastolic BP 60  -CS     Posttreatment Heart Rate (beats/min) 56  -CS     Pre SpO2 (%) 100  -CS     O2 Delivery Pre Treatment trach collar  -CS     O2 Delivery Intra Treatment trach collar  -CS     Post SpO2 (%) 99  -CS     O2 Delivery Post Treatment trach collar  -CS     Pre Patient Position Side Lying  -CS     Intra Patient Position Side Lying  -CS     Post Patient Position Side Lying  -CS     Row Name 12/20/21 1233          Positioning and Restraints    Pre-Treatment Position in bed  -CS     Post Treatment Position bed  -CS     In Bed notified nsg; side lying left; call light within reach; encouraged to call for assist; exit alarm on; with nsg  -CS           User Key  (r) = Recorded By, (t) = Taken By, (c) = Cosigned By    Initials Name Provider Type    Beny Guajardo, OT  Occupational Therapist               Outcome Measures     Row Name 12/20/21 1237          How much help from another is currently needed...    Putting on and taking off regular lower body clothing? 1  -CS     Bathing (including washing, rinsing, and drying) 1  -CS     Toileting (which includes using toilet bed pan or urinal) 1  -CS     Putting on and taking off regular upper body clothing 1  -CS     Taking care of personal grooming (such as brushing teeth) 1  -CS     Eating meals 1  -CS     AM-PAC 6 Clicks Score (OT) 6  -CS     Row Name 12/20/21 0800          How much help from another person do you currently need...    Turning from your back to your side while in flat bed without using bedrails? 1  -LM     Moving from lying on back to sitting on the side of a flat bed without bedrails? 1  -LM     Moving to and from a bed to a chair (including a wheelchair)? 1  -LM     Standing up from a chair using your arms (e.g., wheelchair, bedside chair)? 1  -LM     Climbing 3-5 steps with a railing? 1  -LM     To walk in hospital room? 1  -LM     AM-PAC 6 Clicks Score (PT) 6  -LM     Row Name 12/20/21 1237          Functional Assessment    Outcome Measure Options AM-PAC 6 Clicks Daily Activity (OT)  -CS           User Key  (r) = Recorded By, (t) = Taken By, (c) = Cosigned By    Initials Name Provider Type    Cherie Estrada RN Registered Nurse    Beny Guajardo OT Occupational Therapist              Occupational Therapy Education                 Title: PT OT SLP Therapies (In Progress)     Topic: Occupational Therapy (Done)     Point: Precautions (Done)     Description:   Instruct learner(s) on prescribed precautions during self-care and functional transfers.              Learning Progress Summary           Other Acceptance, D,E, VU by  at 12/20/2021 1237    Comment: Nursing, hand contractures - skin integrity                               User Key     Initials Effective Dates Name Provider Type Discipline    CS  06/16/21 -  Beny Timmons OT Occupational Therapist OT              OT Recommendation and Plan  Retired Outcome Summary/Treatment Plan (OT)  Anticipated Discharge Disposition (OT): long-term acute care facility  Therapy Frequency (OT): evaluation only  Plan of Care Review  Plan of Care Reviewed With: other (see comments) (RN)  Progress: no change (OT eval)  Outcome Summary: OT evaluation completed. Pt presents at baseline, dependent for all ADL completion. Fxl movement patterns severely limitted by BUE/BLE contractures. Provided rollups for palm/skin integrity w/ nursing present. OT signing off, recommend d/c to LTACH vs. Hospice care pending GOC discussion.  Plan of Care Reviewed With: other (see comments) (RN)  Outcome Summary: OT evaluation completed. Pt presents at baseline, dependent for all ADL completion. Fxl movement patterns severely limitted by BUE/BLE contractures. Provided rollups for palm/skin integrity w/ nursing present. OT signing off, recommend d/c to LTACH vs. Hospice care pending GOC discussion.     Time Calculation:    Time Calculation- OT     Row Name 12/20/21 1238             Time Calculation- OT    OT Start Time 1119  -CS      OT Received On 12/20/21  -CS              Untimed Charges    OT Eval/Re-eval Minutes 35  -CS              Total Minutes    Untimed Charges Total Minutes 35  -CS       Total Minutes 35  -CS            User Key  (r) = Recorded By, (t) = Taken By, (c) = Cosigned By    Initials Name Provider Type    CS Beny Timmons, OT Occupational Therapist              Therapy Charges for Today     Code Description Service Date Service Provider Modifiers Qty    [id number]  OT EVAL HIGH COMPLEXITY 3 12/20/2021 Beny Timmons OT GO 1               Beny Timmons OT  12/20/2021

## 2021-12-21 NOTE — SIGNIFICANT NOTE
PEG tube leaking thin, brown liquid. Pt w/ flushed appearance- neck and face. VS unchanged. Abdominal exam- abd soft, PEG tube site w/ dark brown liquid continuous drainage (small amount, drainage w/ appearance of stool??), Resp- slight increase in respiratory rate. Pt non-responsive, non-verbal (no change from baseline).   -STAT CT abd/pel and STAT CT Chest  -labs, including CBC, BMP, lactic acid, procal, proBNP  -wound culture (drainge from PEG site)   -tube feedings stopped/held  Will continue to monitor.     Addendum:   Previously ordered labs resulted this morning; noted to have sodium of 147 (previously 143 on 12/18). Pt has remained NPO tonight 2/2 PEG tube insertion site w/ brown drainage, defer restarting tube feedings to rounding MN. Discussed w/ Dr. Espinosa. Previously ordered CT abd/pel and Chest both without acute findings.   -Sodium chloride 0.45% @ 50ml/hour x 10 hours; adjust PRN   -q 4 hours sodium x 4 occurrences   Will continue to monitor.

## 2021-12-21 NOTE — NURSING NOTE
Pt nonverbal, VSS, bradycardic for most of shift. O2 at 35%, per RT. Pt suctioned frequently throughout shift. Appeared flushed, temp at 98.9. Will continue to monitor.

## 2021-12-21 NOTE — PROGRESS NOTES
Palliative Care Progress Note    Date of Admission: 12/17/2021    Subjective: Patient unable to provide any subjective information.  Nurse staff note the patient has been having increasing drainage around her PEG tube.  Current Code Status     Date Active Code Status Order ID Comments User Context       12/17/2021 2258 No CPR (Do Not Attempt to Resuscitate) [id number]  Erik Dorsey PA-C ED     Advance Care Planning Activity      Questions for Current Code Status     Question Answer    Code Status (Patient has no pulse and is not breathing) No CPR (Do Not Attempt to Resuscitate)    Medical Interventions (Patient has pulse or is breathing) Limited Support    Medical Intervention Limits: NO intubation (DNI)    Level Of Support Discussed With Health Care Surrogate        No current facility-administered medications on file prior to encounter.     Current Outpatient Medications on File Prior to Encounter   Medication Sig Dispense Refill   • amLODIPine (NORVASC) 10 MG tablet Take 10 mg by mouth Daily.     • aspirin 325 MG tablet Take 325 mg by mouth Daily.     • budesonide (PULMICORT) 0.5 MG/2ML nebulizer solution Take 0.5 mg by nebulization Daily.     • furosemide (LASIX) 20 MG tablet Take 20 mg by mouth 2 (Two) Times a Day.     • glycopyrrolate (ROBINUL) 1 MG tablet Take 1 mg by mouth 3 (Three) Times a Day.     • HYDROcodone-acetaminophen (NORCO) 5-325 MG per tablet Take 1 tablet by mouth Every 6 (Six) Hours As Needed.     • Lactobacillus (FLORANEX) pack oral packet Take  by mouth 3 (Three) Times a Day.     • levETIRAcetam (KEPPRA) 100 MG/ML solution Take 10 mg/kg by mouth 2 (Two) Times a Day.     • metoclopramide (REGLAN) 5 MG/5ML solution Take  by mouth 4 (Four) Times a Day Before Meals & at Bedtime.     • metoprolol tartrate (LOPRESSOR) 50 MG tablet Take 50 mg by mouth 2 (Two) Times a Day.     • nystatin (MYCOSTATIN) 019843 UNIT/ML suspension Swish and swallow 500,000 Units 4 (Four) Times a Day.     • Ped  "Multivitamins-Fl-Iron (MULTIVITAMIN WITH FLUORIDE/IRON) 0.25-10 MG/ML solution solution Take  by mouth Daily.     • potassium chloride (MICRO-K) 8 MEQ CR capsule Take 8 mEq by mouth 2 (Two) Times a Day.     • senna (SENOKOT) 8.6 MG tablet tablet Take  by mouth Every Night.     • vitamin D (ERGOCALCIFEROL) 42638 units capsule capsule Take 50,000 Units by mouth 1 (One) Time Per Week.          •  acetaminophen  •  dextrose  •  dextrose  •  glucagon (human recombinant)  •  glycopyrrolate  •  guaiFENesin  •  ipratropium-albuterol  •  LORazepam  •  melatonin  •  Morphine  •  sodium chloride    Objective: /74 (BP Location: Right leg, Patient Position: Lying)   Pulse 57   Temp 99.4 °F (37.4 °C) (Axillary)   Resp 20   Ht 165 cm (64.96\")   Wt 73.9 kg (163 lb)   SpO2 100%   BMI 27.16 kg/m²      Intake/Output Summary (Last 24 hours) at 12/21/2021 1345  Last data filed at 12/20/2021 1830  Gross per 24 hour   Intake 2681 ml   Output 800 ml   Net 1881 ml     Physical Exam:      General Appearance:    Non-verbal   Head:    Normocephalic, without obvious abnormality, atraumatic   Eyes:            Lids and lashes normal, conjunctivae and sclerae normal, no   icterus, no pallor, corneas clear, PERRLA   Ears:    Ears appear intact with no abnormalities noted   Throat:   No oral lesions, no thrush, oral mucosa moist   Neck:   No adenopathy, supple, trachea midline, no thyromegaly, no     carotid bruit, no JVD   Back:     No kyphosis present, no scoliosis present, no skin lesions,       erythema or scars, no tenderness to percussion or                   palpation,   range of motion normal   Lungs:     Clear to auscultation,respirations regular, even and                   unlabored    Heart:    Regular rhythm and normal rate, normal S1 and S2, no            murmur, no gallop, no rub, no click   Breast Exam:    Deferred   Abdomen:     PEG tube with drainage around it   Genitalia:    Deferred   Extremities:   Upper extremity " contractures noted   Pulses:   Pulses palpable and equal bilaterally   Skin:   No bleeding, bruising or rash   Lymph nodes:   No palpable adenopathy         Results from last 7 days   Lab Units 12/21/21  0357   WBC 10*3/mm3 4.81   HEMOGLOBIN g/dL 11.4*   HEMATOCRIT % 35.6   PLATELETS 10*3/mm3 316     Results from last 7 days   Lab Units 12/21/21  0357   SODIUM mmol/L 147*   POTASSIUM mmol/L 4.0   CHLORIDE mmol/L 107   CO2 mmol/L 32.0*   BUN mg/dL 32*   CREATININE mg/dL 0.60   CALCIUM mg/dL 8.9   BILIRUBIN mg/dL 0.2   ALK PHOS U/L 122*   ALT (SGPT) U/L 52*   AST (SGOT) U/L 39*   GLUCOSE mg/dL 255*       Impression: Asp PNA  COPD  Sepsis  Change in MS  PEG tube drainage  GOC    Plan: Unfortunately, despite appropriate care, the patient does not appear to be showing any significant improvement and is actually having complications with her PEG tube which may mean that tube feeds are not a sufficient option.  Even if the PEG tube site improves there will continue to be complications with the patient's PEG tube and use of tube feeds as she is already developed aspiration pneumonia and since she has developed aspiration ammonia with PEG tube she will continue to have problems with aspiration pneumonia as her body gets progressively weak.  My recommendation is to proceed for comfort focused plan of care including looking towards a hospice plan and getting hospice involved.        Cali Berry,   12/21/21  13:45 EST

## 2021-12-21 NOTE — PROGRESS NOTES
[facility] Medicine Services  PROGRESS NOTE    Patient Name: Yulisa Valdez  : 1960  MRN: [id number]    Date of Admission: 2021  Primary Care Physician: Ester Moya MD    Subjective   Subjective     CC:  F/u respiratory failure     HPI:  Pt had dark brown drainage from PEG     ROS:  Unable to assess     Objective   Objective     Vital Signs:   Temp:  [98.3 °F (36.8 °C)-99.4 °F (37.4 °C)] 99.4 °F (37.4 °C)  Heart Rate:  [57-75] 69  Resp:  [16-32] 18  BP: (133-162)/(59-75) 162/75  Flow (L/min):  [4-8] 8     Physical Exam:  Constitutional: No acute distress, chronically ill appearing female   HENT: NCAT, mucous membranes moist, trach without discharge   Respiratory: crackles bilaterally, + labored breathing   Cardiovascular: RRR, no murmurs, rubs, or gallops  Gastrointestinal: Positive bowel sounds, soft, nontender, nondistended  Musculoskeletal: No bilateral ankle edema, contracture of extremities   Neurologic: Opens eyes to stimuli   Skin: No rashes      Results Reviewed:  LAB RESULTS:      Lab 21  0357 21  1122 21   WBC 4.81 8.22  --  16.53*   HEMOGLOBIN 11.4* 11.5*  --  14.1   HEMATOCRIT 35.6 37.2  --  44.6   PLATELETS 316 216  --  256   NEUTROS ABS 3.88  --   --  12.71*   IMMATURE GRANS (ABS) 0.03  --   --  0.07*   LYMPHS ABS 0.79  --   --  2.52   MONOS ABS 0.11  --   --  1.12*   EOS ABS 0.00  --   --  0.04   MCV 93.2 95.6  --  93.1   PROCALCITONIN 0.06  --  0.17  --    LACTATE 1.7  --   --  1.4         Lab 21  0357 21  1115 21  1043 21   SODIUM 147* 143 143 138  --    POTASSIUM 4.0 3.6 3.6 3.4*  --    CHLORIDE 107 108* 108* 101  --    CO2 32.0* 27.0 26.0 25.0  --    ANION GAP 8.0 8.0 9.0 12.0  --    BUN 32* 15 15 14  --    CREATININE 0.60 0.44* 0.48* 0.71  --    GLUCOSE 255* 268* 265* 219*  --    CALCIUM 8.9 9.1 9.1 9.3  --    MAGNESIUM 2.2  --   --  2.4  --    PHOSPHORUS 2.5  --    --   --   --    HEMOGLOBIN A1C  --   --   --   --  6.50*         Lab 12/21/21  0357 12/18/21  1115 12/17/21 2048   TOTAL PROTEIN 6.8 6.3 7.5   ALBUMIN 3.20* 2.60* 3.10*   GLOBULIN  --  3.7 4.4   ALT (SGPT) 52* 24 34*   AST (SGOT) 39* 11 17   BILIRUBIN 0.2 0.2 0.5   ALK PHOS 122* 122* 170*         Lab 12/21/21  0357 12/17/21 2048   PROBNP 2,721.0* 2,686.0*   TROPONIN T  --  0.019         Lab 12/21/21  0357   CHOLESTEROL 122   TRIGLYCERIDES 146             Brief Urine Lab Results  (Last result in the past 365 days)      Color   Clarity   Blood   Leuk Est   Nitrite   Protein   CREAT   Urine HCG        12/18/21 1253 Yellow   Clear   Moderate (2+)   Moderate (2+)   Negative   Negative                 Microbiology Results Abnormal     Procedure Component Value - Date/Time    Blood Culture - Blood, Arm, Left [[id number]]  (Normal) Collected: 12/17/21 2120    Lab Status: Preliminary result Specimen: Blood from Arm, Left Updated: 12/21/21 0645     Blood Culture No growth at 3 days    Blood Culture - Blood, Hand, Right [[id number]]  (Normal) Collected: 12/17/21 2120    Lab Status: Preliminary result Specimen: Blood from Hand, Right Updated: 12/21/21 0645     Blood Culture No growth at 3 days    Wound Culture - Wound, Abdominal Wall [[id number]] Collected: 12/20/21 2020    Lab Status: Preliminary result Specimen: Wound from Abdominal Wall Updated: 12/20/21 2120     Gram Stain No WBCs seen      Occasional Gram positive cocci      Occasional Gram variable bacilli    Respiratory Culture - Sputum, Cough [[id number]] Collected: 12/18/21 1253    Lab Status: Final result Specimen: Sputum from Cough Updated: 12/20/21 1045     Respiratory Culture Rare Normal respiratory ze. No S. aureus or Pseudomonas aeruginosa detected. Final report.     Gram Stain Many (4+) WBCs per low power field      Few (2+) Epithelial cells per low power field      No organisms seen    Urine Culture - Urine, Urine, Clean Catch [[id number]] Collected: 12/18/21  1253    Lab Status: Final result Specimen: Urine, Clean Catch Updated: 12/19/21 0727     Urine Culture 25,000 CFU/mL Normal Urogenital Lindsey    S. Pneumo Ag Urine or CSF - Urine, Urine, Clean Catch [[id number]]  (Normal) Collected: 12/18/21 1253    Lab Status: Final result Specimen: Urine, Clean Catch Updated: 12/18/21 2148     Strep Pneumo Ag Negative    Legionella Antigen, Urine - Urine, Urine, Clean Catch [[id number]]  (Normal) Collected: 12/18/21 1253    Lab Status: Final result Specimen: Urine, Clean Catch Updated: 12/18/21 2148     LEGIONELLA ANTIGEN, URINE Negative    MRSA Screen, PCR (Inpatient) - Swab, Nares [[id number]]  (Normal) Collected: 12/18/21 1830    Lab Status: Final result Specimen: Swab from Nares Updated: 12/18/21 2011     MRSA PCR Negative    Narrative:      MRSA Negative    COVID PRE-OP / PRE-PROCEDURE SCREENING ORDER (NO ISOLATION) - Swab, Nasopharynx [[id number]]  (Normal) Collected: 12/18/21 0220    Lab Status: Final result Specimen: Swab from Nasopharynx Updated: 12/18/21 0253    Narrative:      The following orders were created for panel order COVID PRE-OP / PRE-PROCEDURE SCREENING ORDER (NO ISOLATION) - Swab, Nasopharynx.  Procedure                               Abnormality         Status                     ---------                               -----------         ------                     COVID-19, ABBOTT IN-HOUS...[[id number]]  Normal              Final result                 Please view results for these tests on the individual orders.    COVID-19, ABBOTT IN-HOUSE,NASAL Swab (NO TRANSPORT MEDIA) 2 HR TAT - Swab, Nasopharynx [[id number]]  (Normal) Collected: 12/18/21 0220    Lab Status: Final result Specimen: Swab from Nasopharynx Updated: 12/18/21 0253     COVID19 Presumptive Negative    Narrative:      Fact sheet for providers: https://www.fda.gov/media/855902/download     Fact sheet for patients: https://www.fda.gov/media/774247/download    Test performed by PCR.  If inconsistent with  clinical signs and symptoms patient should be tested with different authorized molecular test.    Respiratory Panel PCR w/COVID-19(SARS-CoV-2) JANIS/KALE/GEOVANI/PAD/COR/MAD/MATHIEU In-House, NP Swab in UTM/VTM, 3-4 HR TAT - Swab, Nasopharynx [[id number]]  (Normal) Collected: 12/17/21 2128    Lab Status: Final result Specimen: Swab from Nasopharynx Updated: 12/17/21 2230     ADENOVIRUS, PCR Not Detected     Coronavirus 229E Not Detected     Coronavirus HKU1 Not Detected     Coronavirus NL63 Not Detected     Coronavirus OC43 Not Detected     COVID19 Not Detected     Human Metapneumovirus Not Detected     Human Rhinovirus/Enterovirus Not Detected     Influenza A PCR Not Detected     Influenza B PCR Not Detected     Parainfluenza Virus 1 Not Detected     Parainfluenza Virus 2 Not Detected     Parainfluenza Virus 3 Not Detected     Parainfluenza Virus 4 Not Detected     RSV, PCR Not Detected     Bordetella pertussis pcr Not Detected     Bordetella parapertussis PCR Not Detected     Chlamydophila pneumoniae PCR Not Detected     Mycoplasma pneumo by PCR Not Detected    Narrative:      In the setting of a positive respiratory panel with a viral infection PLUS a negative procalcitonin without other underlying concern for bacterial infection, consider observing off antibiotics or discontinuation of antibiotics and continue supportive care. If the respiratory panel is positive for atypical bacterial infection (Bordetella pertussis, Chlamydophila pneumoniae, or Mycoplasma pneumoniae), consider antibiotic de-escalation to target atypical bacterial infection.          CT Abdomen Pelvis Without Contrast    Result Date: 12/20/2021  CT Chest WO, CT Abdomen Pelvis WO INDICATION: Respiratory failure and sepsis with leaking PEG tube. TECHNIQUE: CT of the chest, abdomen and pelvis without contrast. Coronal and sagittal reconstructions were obtained.  Radiation dose reduction techniques included automated exposure control or exposure modulation  based on body size. Radiation audit for number of CT and nuclear cardiology exams performed in the last year: 2.  COMPARISON: December 17, 2021 FINDINGS: Chest: The study demonstrates prominent atelectasis in the right lung base with a small amount of pleural fluid. This is increased slightly from the study of 12/17/2021. The left lung remains relatively clear. The heart size is enlarged. No pericardial fluid. Prominent elevation of the right hemidiaphragm. No threshold mediastinal or hilar adenopathy. No axillary adenopathy. No evidence of a pneumothorax. Regional osseous structures are mildly demineralized. No acute or aggressive bone lesions. Abdomen: Noncontrasted imaging of the liver shows no acute findings. The pancreas and spleen show no acute findings. The right kidney shows multiple nonobstructing renal calculi. No hydronephrosis. There is a punctate nonobstructing calculus in the left kidney. No adrenal abnormalities. A PEG tube is demonstrated which appears to be within the body of the stomach. No evidence of gastric perforation. No free air is identified. No free fluid is seen. Pelvis: The bowel pattern is nonobstructive. Gao catheter demonstrated within the bladder. The bladder is collapsed. No threshold pelvic or abdominal adenopathy. Moderate amount of fecal residue demonstrated within the rectosigmoid area.     Impression: 1.  Extensive atelectasis in the right lung base with a small amount of pleural fluid. These findings appear increased from the study of 12/17/2021. 2.  A PEG tube is demonstrated which appears to be within the body of the stomach. No evidence of gastric perforation. 3.  Bilateral nonobstructing renal calculi. No hydronephrosis. Signer Name: Wes Dykes MD  Signed: 12/20/2021 9:30 PM  Workstation Name: [id number]  [facility]    CT Chest Without Contrast Diagnostic    Result Date: 12/20/2021  CT Chest WO, CT Abdomen Pelvis WO INDICATION: Respiratory  failure and sepsis with leaking PEG tube. TECHNIQUE: CT of the chest, abdomen and pelvis without contrast. Coronal and sagittal reconstructions were obtained.  Radiation dose reduction techniques included automated exposure control or exposure modulation based on body size. Radiation audit for number of CT and nuclear cardiology exams performed in the last year: 2.  COMPARISON: December 17, 2021 FINDINGS: Chest: The study demonstrates prominent atelectasis in the right lung base with a small amount of pleural fluid. This is increased slightly from the study of 12/17/2021. The left lung remains relatively clear. The heart size is enlarged. No pericardial fluid. Prominent elevation of the right hemidiaphragm. No threshold mediastinal or hilar adenopathy. No axillary adenopathy. No evidence of a pneumothorax. Regional osseous structures are mildly demineralized. No acute or aggressive bone lesions. Abdomen: Noncontrasted imaging of the liver shows no acute findings. The pancreas and spleen show no acute findings. The right kidney shows multiple nonobstructing renal calculi. No hydronephrosis. There is a punctate nonobstructing calculus in the left kidney. No adrenal abnormalities. A PEG tube is demonstrated which appears to be within the body of the stomach. No evidence of gastric perforation. No free air is identified. No free fluid is seen. Pelvis: The bowel pattern is nonobstructive. Gao catheter demonstrated within the bladder. The bladder is collapsed. No threshold pelvic or abdominal adenopathy. Moderate amount of fecal residue demonstrated within the rectosigmoid area.     Impression: 1.  Extensive atelectasis in the right lung base with a small amount of pleural fluid. These findings appear increased from the study of 12/17/2021. 2.  A PEG tube is demonstrated which appears to be within the body of the stomach. No evidence of gastric perforation. 3.  Bilateral nonobstructing renal calculi. No hydronephrosis.  Signer Name: Wes Dykes MD  Signed: 12/20/2021 9:30 PM  Workstation Name: [id number]  [facility]      Results for orders placed during the hospital encounter of 12/17/21    Adult Transthoracic Echo Complete W/ Cont if Necessary Per Protocol    Interpretation Summary  · The study is technically difficult for diagnosis.  · Left ventricular ejection fraction appears to be 56 - 60%. Left ventricular systolic function is normal.  · Left ventricular wall thickness is consistent with mild to moderate concentric hypertrophy.      I have reviewed the medications:  Scheduled Meds:amLODIPine, 10 mg, Per PEG Tube, Daily  aspirin, 325 mg, Per PEG Tube, Daily  budesonide, 0.5 mg, Nebulization, Daily - RT  furosemide, 20 mg, Per PEG Tube, BID  heparin (porcine), 5,000 Units, Subcutaneous, Q12H  insulin regular, 0-7 Units, Subcutaneous, Q6H  lactobacillus acidophilus, 1 capsule, Per PEG Tube, Daily  levETIRAcetam, 500 mg, Intravenous, Q12H  meropenem, 1 g, Intravenous, Q8H  methylPREDNISolone sodium succinate, 40 mg, Intravenous, Q12H  metoclopramide, 5 mg, Per PEG Tube, 4x Daily AC & at Bedtime  metoprolol tartrate, 50 mg, Per PEG Tube, Q12H  multivitamin, 1 tablet, Per G Tube, Daily  PRO-STAT, 30 mL, Oral, Daily  sodium chloride, 10 mL, Intravenous, Q12H      Continuous Infusions:sodium chloride, 50 mL/hr, Last Rate: 50 mL/hr (12/21/21 0605)      PRN Meds:.•  acetaminophen  •  dextrose  •  dextrose  •  glucagon (human recombinant)  •  guaiFENesin  •  ipratropium-albuterol  •  melatonin  •  Morphine  •  sodium chloride  •  sodium chloride    Assessment/Plan   Assessment & Plan     Active Hospital Problems    Diagnosis  POA   • **Sepsis ([facility]) [A41.9]  Yes   • COPD (chronic obstructive pulmonary disease) ([facility]) [J44.9]  Yes   • GERD without esophagitis [K21.9]  Yes   • Epilepsy ([facility]) [G40.909]  Yes   • Schizoaffective disorder ([facility]) [F25.9]  Yes   • DM (diabetes mellitus) ([facility]) [E11.9]  Yes   • Pneumonia  of right upper lobe due to infectious organism [J18.9]  Yes   • History of anoxic brain injury [Z86.69]  Not Applicable   • Essential hypertension [I10]  Yes   • Hypothyroidism [E03.9]  Yes   • Cerebral palsy (HCC) [G80.9]  Yes      Resolved Hospital Problems   No resolved problems to display.        Brief Hospital Course to date:  Yulisa Valdez is a 61 y.o. female with PMH of chronic respiratory failure with COPD, cerebral palsy with epilepsy, anoxic brain injury, DM2, hypothyroidism, GERD, and tracheostomy and PEG tube placement. She presented from [facility] in acute respiratory distress with fever. Records reviewed indicate that patient has recurrent aspiration pneumonia for which she was recently admitted to [facility] in October. She was then transferred to LTAC. 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     Sepsis  Aspiration Pneumonia  Acute on Chronic Respiratory Failure  COPD   -Respiratory panel negative  -blood cultures NG at 2 days   -merrem course complete   -sputum culture normal ze  -urine ag neg    -duo neb  -solumedrol 40mg BID    -trach care    Hypernatremia   -Na 147   -holding tube feeds for aspiration     DM II  -SSI  -a1c 6.5    Seizure disorder  Hx CP with epilepsy and anoxic brain injury  -cont keppra    HTN  -home meds, includes lasix     GOC  Patient continues to have issues with aspiration pneumonia despite having tube feeds. She has several comorbid conditions that compromise recovery. I recommend comfort measures and Hospice care as the burdern of acontinued aggressive treatments outweighs benefit. Her overall prognosis is poor and she is without hope of meaningful recovery or improvement.     DVT prophylaxis:  Medical DVT prophylaxis orders are present.       AM-PAC 6 Clicks Score (PT): 6 (12/20/21 0800)    Disposition: I expect the patient to be discharged TBD    CODE STATUS:   Code Status and Medical Interventions:   Ordered at: 12/17/21 4275      Medical Intervention Limits:    NO intubation (DNI)     Level Of Support Discussed With:    Health Care Surrogate     Code Status (Patient has no pulse and is not breathing):    No CPR (Do Not Attempt to Resuscitate)     Medical Interventions (Patient has pulse or is breathing):    Limited Support       Paola Osborn, DO  12/21/21

## 2021-12-21 NOTE — PLAN OF CARE
Problem: Adult Inpatient Plan of Care  Goal: Plan of Care Review  Flowsheets (Taken 12/21/2021 1345)  Plan of Care Reviewed With: guardian  Outcome Summary: Physician recommendation for End of life care/comfort/hospice care - LCSW facilitating process by faxing recommendation and supporting documentation from medical record.  Call to notify patient's Guardianship worker Cherry Yarbrough. Multiple comorbidities impeding improvement/meaningful recovery - recurrent aspiration and respiratory failure/distress, already DNR/DNI through guardianship,  Patient [facility] medicaid [facility] - possible return to NH with Hospice there vs inpatient Hospice depending on patient rapidity in decline. Fax to [facility] nurse consultant today-61 pages of documentation sent.  Will await decision.     Problem: Palliative Care  Goal: Enhanced Quality of Life  Outcome: Ongoing, Progressing  Intervention: Promote Advance Care Planning  Flowsheets (Taken 12/21/2021 1345)  Life Transition/Adjustment: (physician recommendation for end of life faxed today to [facility] nurse consultant, Guardianship worker informed of recommendation) end-of-life care initiated  Intervention: Optimize Psychosocial Wellbeing  Recent Flowsheet Documentation  Taken 12/21/2021 1345 by Desire Ferreira MSW  Family/Support System Care: (contact with pts guardianship worker to inform of physician recommendation for end of life care) other (see comments)

## 2021-12-21 NOTE — PLAN OF CARE
Goal Outcome Evaluation:  Plan of Care Reviewed With: patient        Progress: no change  Outcome Summary: Pt. resting in bed at time of visit with dyspnea as only visible symptom.  FC maintained.  PEG leaking brown fluid and TF turned off for time being.  Nurse unable to administer meds into PEG at this point.  Dr. Berry notified.  O2 per trach collar.  Pt. has a lot of secretions. Possible return to NH with hospice vs. inpatient hospice.  Palliative care to follow for support, POC and symptom management.        [facility] Palliative IDT Conference: KARLOS Berry, ; DONNA Barboza, RN, MEDSUR-BC, CHPN, OCN, JESUS Ferreira, LCSW; FLORY Hernandez, RN; ADI Schultz, RN, CHPN; HAILEY Luna RN. PN; JESUS Dawson APRN; ADI Renteria MDiv

## 2021-12-21 NOTE — CONSULTS
"                  Clinical Nutrition     Nutrition Assessment  Reason for Visit:   Follow-up protocol, EN      Patient Name: Yulisa Valdez  YOB: 1960  MRN: [id number]  Date of Encounter: 12/21/21 13:48 EST  Admission date: 12/17/2021      Comments:  EN held 2/2 stool obs around G tube per RN. GOC pending    Admission Diagnosis    Sepsis (HCC) [A41.9]      Problem List    Pneumonia of right upper lobe due to infectious organism    History of anoxic brain injury    Epilepsy (HCC)    Cerebral palsy (HCC)    Schizoaffective disorder (HCC)    COPD (chronic obstructive pulmonary disease) (HCC)    GERD without esophagitis    DM (diabetes mellitus) (HCC)    Hypothyroidism    Essential hypertension    FTT    Other Applicable:  Hx recurrent asp PNA, osteoporosis    Applicable Interval History:      Applicable PMH/PSxH:     PMH: She  has a past medical history of COPD (chronic obstructive pulmonary disease) (HCC), Diabetes (HCC), Epilepsy (HCC), GERD (gastroesophageal reflux disease), Osteoporosis, Schizoaffective disorder (HCC), and Seizures (HCC).   PSxH: She  has a past surgical history that includes Tracheostomy tube placement.         Diet/Nutrition Related History:     EN held 2/2 stool obs around G tube per RN. GO pending      Anthropometrics   This adm  Admission Height 165.1 cm (65\") Documented at 12/17/2021 1936   Admission Weight 73.9 kg (163 lb) Documented at 12/17/2021 1936        Height: 165 cm (64.96\")    Last filed wt: Weight: 73.9 kg (163 lb) (12/17/21 1938)  Weight Method: Estimated    BMI: BMI (Calculated): 27.2  Overweight: 25.0-29.9kg/m2     Ideal Body Weight (IBW) (kg): 57.2      Weight Change   UBW: rpt of 175 lbs in 2019  Weight change:   % wt change:   Time frame of weight loss:     Labs reviewed   Yes  Note elevated pBNP  Results from last 7 days   Lab Units 12/21/21  0357 12/18/21  1115 12/18/21  1043 12/17/21 2048 12/17/21 2048   GLUCOSE mg/dL 255* 268* 265*   < > 219*   BUN " mg/dL 32* 15 15   < > 14   CREATININE mg/dL 0.60 0.44* 0.48*   < > 0.71   SODIUM mmol/L 147* 143 143   < > 138   CHLORIDE mmol/L 107 108* 108*   < > 101   POTASSIUM mmol/L 4.0 3.6 3.6   < > 3.4*   PHOSPHORUS mg/dL 2.5  --   --   --   --    MAGNESIUM mg/dL 2.2  --   --   --  2.4   ALT (SGPT) U/L 52* 24  --   --  34*    < > = values in this interval not displayed.     Results from last 7 days   Lab Units 12/21/21  0357 12/18/21  1115 12/17/21  2048   ALBUMIN g/dL 3.20* 2.60* 3.10*   PREALBUMIN mg/dL 21.3  --   --    CHOLESTEROL mg/dL 122  --   --    TRIGLYCERIDES mg/dL 146  --   --        Results from last 7 days   Lab Units 12/21/21  1224 12/21/21  0557 12/21/21  0015 12/20/21 2017 12/20/21  1733 12/20/21  1125   GLUCOSE mg/dL 194* 231* 265* 303* 268* 235*       Lab Results   Lab Value Date/Time    HGBA1C 6.50 (H) 12/17/2021 2047       Medications reviewed   Yes   Insulin, Keppra, Multi vit min, Lasix. Probiotic, Steroid    Intake/Ouptut 24 hrs reviewed   Yes    Needs Assessment 12/18     Weight used:  73.9 Kg  IBW: 56.8 Kg    Estimated Energy needs:  ~ 500 Kcal/da based on 20 Kcal/Kg      Estimated Protein needs:  ~76 g/da based on 1.2-1.5 g/Kg IBW      Estimated Fluid needs: per clinical status      Current Nutrition Prescription     PO: NPO Diet  Orders Placed This Encounter      Diet, Tube Feeding Tube Feeding Formula: Isosource 1.5 (Calorically Dense)   goal 45 ml/hr 1 Prostat/da  Water at 30 ml/hr.     At goal EN will supply 1585 Kcal 106% est need, 82 g % est need, 15 g Fiber, 752 ml H20 in TF 1412 total ml Free Water.       Delivery: insuffic data      Nutrition Diagnosis     12/18, 12/21  Problem Inadequate oral intake   Etiology Pt w Tach/PEG   Signs/Symptoms NPO   Status: EN now on hold pending [facility]      Goal:   General: Nutrition support treatment  EN: per [facility]  Additional goals:      Nutrition Intervention     Follow treatment progress, Care plan reviewed      Monitoring/Evaluation:   Per protocol,  Pertinent labs, EN delivery/tolerance, Weight, Symptoms as appropriate        Joanne Smiley RD,   Time Spent: 25 min

## 2021-12-22 NOTE — DISCHARGE PLACEMENT REQUEST
"Yulisa Valdez (61 y.o. Female)             Date of Birth Social Security Number Address Home Phone MRN    1960  [address] [address] [phone] [id number]    Yazidism Marital Status             [facility] [place] Single       Admission Date Admission Type Admitting Provider Attending Provider Department, Room/Bed    12/17/21 Emergency Zachery Canales MD Schnell, Aaron, MD [facility] 3F, S321/1    Discharge Date Discharge Disposition Discharge Destination                         Attending Provider: Zachery Canales MD    Allergies: Amoxicillin, Cefepime, Haldol [Haloperidol], Levofloxacin, Penicillins    Isolation: None   Infection: None   Code Status: No CPR   Advance Care Planning Activity    Ht: 165 cm (64.96\")   Wt: 73.9 kg (163 lb)    Admission Cmt: None   Principal Problem: Sepsis (HCC) [A41.9]                 Active Insurance as of 12/17/2021     Primary Coverage     Payor Plan Insurance Group Employer/Plan Group    MEDICARE MEDICARE A & B      Payor Plan Address Payor Plan Phone Number Payor Plan Fax Number Effective Dates    [address] [phone]  6/1/2010 - None Entered    [facility] [id number]       Subscriber Name Subscriber Birth Date Member ID       YULISA VALDEZ 1960 [id number]           Secondary Coverage     Payor Plan Insurance Group Employer/Plan Group    [place]      Payor Plan Address Payor Plan Phone Number Payor Plan Fax Number Effective Dates    [address] [phone]  4/28/2018 - None Entered    [place] [address]       Subscriber Name Subscriber Birth Date Member ID       YULISA VALDEZ 1960 [id number]                 Emergency Contacts      (Rel.) Home Phone Work Phone Mobile Phone    Cherry Yarbrough (Guardian) [phone]    Dione Green (Other) [phone] -- --            Emergency Contact Information     Name Relation Home Work Mobile    " Cherry Yarbrough [phone]    Dione Green [phone]            Insurance Information                MEDICARE/MEDICARE A & B Phone: [phone]    Subscriber: Yulisa Valdez Subscriber#: [id number]    Group#: -- Precert#: --        [place] Phone: [phone]    Subscriber: Yulisa Valdez Subscriber#: [id number]    Group#: -- Precert#: --             History & Physical      Pam Nicholson MD at 21 [phone]              [facility] Medicine Services  HISTORY AND PHYSICAL    Patient Name: Yulisa Valdez  : 1960  MRN: [id number]  Primary Care Physician: Ester Moya MD  Date of admission: 2021    Subjective   Subjective     Chief Complaint:  Respiratory distress    HPI:  Yulisa Valdez is a 61 y.o. female. She is DNI/DNR mcmahon of [facility] with a past medical history significant for chronic respiratory failure with COPD, cerebral palsy with epilepsy, anoxic brain injury, DM2, hypothyroidism, GERD, and tracheostomy and PEG tube placement. She presents from [facility] in acute respiratory distress with fever. She also has some increased drainage from tracheostomy. HPI limited secondary to patient disposition. She is nonverbal and obtunded. Records reviewed indicate that patient has recurrent aspiration pneumonia for which she was recently admitted to [facility] in October. She was then transferred to [facility] where she was monitored closely by pulmonary disease.  Currently there are no reports of cough, chest pain syncope, abdominal pain, or N/V/D. Will admit to inpatient for further evaluation and treatment.      COVID Details:    Symptoms:    [] NONE [] Fever [x]  Cough [] Shortness of breath [] Change in taste/smell      Review of Systems   Unable to perform ROS: Patient nonverbal        All other systems reviewed and are negative.     Personal History     Past Medical  History:   Diagnosis Date   • COPD (chronic obstructive pulmonary disease) (HCC)    • Diabetes (HCC)    • Epilepsy (HCC)    • GERD (gastroesophageal reflux disease)    • Osteoporosis    • Schizoaffective disorder (HCC)    • Seizures (HCC)        Past Surgical History:   Procedure Laterality Date   • TRACHEOSTOMY         Family History: family history is not on file. Otherwise pertinent FHx was reviewed and unremarkable.     Social History:  reports that she has quit smoking. She has never used smokeless tobacco. She reports that she does not drink alcohol and does not use drugs.  Social History     Social History Narrative    Patient is a mcmahon of the [employer] and is DNR/DNI       Medications:  Floranex, HYDROcodone-acetaminophen, amLODIPine, aspirin, budesonide, furosemide, glycopyrrolate, levETIRAcetam, metoclopramide, metoprolol tartrate, multivitamin with fluoride/iron, nystatin, potassium chloride, senna, and vitamin D    Allergies   Allergen Reactions   • Amoxicillin Unknown (See Comments)     unknown   • Cefepime Other (See Comments)     Unsure     • Haldol [Haloperidol] Unknown (See Comments)     unknown   • Levofloxacin Other (See Comments)     unsure   • Penicillins Unknown (See Comments)     unknown       Objective   Objective     Vital Signs:   Temp:  [101.1 °F (38.4 °C)] 101.1 °F (38.4 °C)  Heart Rate:  [130] 130  Resp:  [24] 24  BP: (147)/(95) 147/95    Physical Exam   Constitutional: Awake, alert  Eyes: PERRLA, sclerae anicteric, no conjunctival injection  HENT: NCAT, mucous membranes moist  Neck: Supple, no thyromegaly, no lymphadenopathy, trachea midline  Respiratory: crackles and wheezes bilaterally, thick drainage from tracheostomy nonlabored respirations   Cardiovascular: RRR, no murmurs, rubs, or gallops, palpable pedal pulses bilaterally  Gastrointestinal: Positive bowel sounds, soft, nontender, nondistended  PEG tube placement  Musculoskeletal: trace BLE edema no clubbing or cyanosis to  extremities  Psychiatric: Appropriate affect, cooperative  Neurologic: Oriented x 3, strength symmetric in all extremities, Cranial Nerves grossly intact to confrontation, speech clear  Skin: No rashes      Results Reviewed:  I have personally reviewed most recent indicated data and agree with findings including:  [x]  Laboratory  [x]  Radiology  []  EKG/Telemetry  []  Pathology  []  Cardiac/Vascular Studies  []  Old records  []  Other:  Most pertinent findings include: febrile to 101.1. . Glucose 219. Potassium 3.4. Alk phos 170. WBC 16.5. CXR shows right upper lobe pneumonia    LAB RESULTS:      Lab 12/17/21 2048 12/17/21 2047 12/13/21 0459   WBC  --  16.53* 6.20   HEMOGLOBIN  --  14.1 11.3*   HEMATOCRIT  --  44.6 37.4   PLATELETS  --  256 256   NEUTROS ABS  --  12.71* 2.98   IMMATURE GRANS (ABS)  --  0.07* 0.02   LYMPHS ABS  --  2.52 2.15   MONOS ABS  --  1.12* 0.62   EOS ABS  --  0.04 0.38   MCV  --  93.1 95.4   CRP  --   --  1.52*   PROCALCITONIN 0.17  --   --    LACTATE  --  1.4  --          Lab 12/17/21 2048 12/13/21 0459   SODIUM 138 141   POTASSIUM 3.4* 3.6   CHLORIDE 101 105   CO2 25.0 26.9   ANION GAP 12.0 9.1   BUN 14 12   CREATININE 0.71 0.48*   GLUCOSE 219* 170*   CALCIUM 9.3 9.1   MAGNESIUM 2.4  --          Lab 12/17/21 2048 12/13/21 0459   TOTAL PROTEIN 7.5 6.4   ALBUMIN 3.10* 2.74*   GLOBULIN 4.4 3.7   ALT (SGPT) 34* 29   AST (SGOT) 17 17   BILIRUBIN 0.5 <0.2   ALK PHOS 170* 146*         Lab 12/17/21 2048   PROBNP 2,686.0*   TROPONIN T 0.019                 Brief Urine Lab Results     None        Microbiology Results (last 10 days)     Procedure Component Value - Date/Time    Respiratory Panel PCR w/COVID-19(SARS-CoV-2) JANIS/KALE/GEOVANI/PAD/COR/MAD/MATHIEU In-House, NP Swab in UTM/VTM, 3-4 HR TAT - Swab, Nasopharynx [[id number]]  (Normal) Collected: 12/17/21 2128    Lab Status: Final result Specimen: Swab from Nasopharynx Updated: 12/17/21 2230     ADENOVIRUS, PCR Not Detected     Coronavirus  229E Not Detected     Coronavirus HKU1 Not Detected     Coronavirus NL63 Not Detected     Coronavirus OC43 Not Detected     COVID19 Not Detected     Human Metapneumovirus Not Detected     Human Rhinovirus/Enterovirus Not Detected     Influenza A PCR Not Detected     Influenza B PCR Not Detected     Parainfluenza Virus 1 Not Detected     Parainfluenza Virus 2 Not Detected     Parainfluenza Virus 3 Not Detected     Parainfluenza Virus 4 Not Detected     RSV, PCR Not Detected     Bordetella pertussis pcr Not Detected     Bordetella parapertussis PCR Not Detected     Chlamydophila pneumoniae PCR Not Detected     Mycoplasma pneumo by PCR Not Detected    Narrative:      In the setting of a positive respiratory panel with a viral infection PLUS a negative procalcitonin without other underlying concern for bacterial infection, consider observing off antibiotics or discontinuation of antibiotics and continue supportive care. If the respiratory panel is positive for atypical bacterial infection (Bordetella pertussis, Chlamydophila pneumoniae, or Mycoplasma pneumoniae), consider antibiotic de-escalation to target atypical bacterial infection.    COVID PRE-OP / PRE-PROCEDURE SCREENING ORDER (NO ISOLATION) - Swab, Nasal Cavity [[id number]]  (Normal) Collected: 12/12/21 0929    Lab Status: Final result Specimen: Swab from Nasal Cavity Updated: 12/12/21 1138    Narrative:      The following orders were created for panel order COVID PRE-OP / PRE-PROCEDURE SCREENING ORDER (NO ISOLATION) - Swab, Nasal Cavity.  Procedure                               Abnormality         Status                     ---------                               -----------         ------                     COVID-19,CEPHEID/VALERI/BD...[[id number]]  Normal              Final result                 Please view results for these tests on the individual orders.    COVID-19,CEPHEID/VALERI/BDMAX,COR/GEOVANI/PAD/JUAN IN-HOUSE(OR EMERGENT/ADD-ON),NP SWAB IN TRANSPORT MEDIA 3-4  HR TAT, RT-PCR - Swab, Nasopharynx [[id number]]  (Normal) Collected: 12/12/21 0929    Lab Status: Final result Specimen: Swab from Nasopharynx Updated: 12/12/21 1138     COVID19 Not Detected    Narrative:      Fact sheet for providers: https://www.fda.gov/media/730561/download     Fact sheet for patients: https://www.fda.gov/media/767280/download  Fact sheet for providers: https://www.fda.gov/media/257714/download     Fact sheet for patients: https://www.fda.gov/media/728009/download    COVID PRE-OP / PRE-PROCEDURE SCREENING ORDER (NO ISOLATION) - Swab, Nasopharynx [[id number]]  (Normal) Collected: 12/10/21 1237    Lab Status: Final result Specimen: Swab from Nasopharynx Updated: 12/10/21 1317    Narrative:      The following orders were created for panel order COVID PRE-OP / PRE-PROCEDURE SCREENING ORDER (NO ISOLATION) - Swab, Nasopharynx.  Procedure                               Abnormality         Status                     ---------                               -----------         ------                     COVID-19 and FLU A/B PCR...[[id number]]  Normal              Final result                 Please view results for these tests on the individual orders.    COVID-19 and FLU A/B PCR - Swab, Nasopharynx [[id number]]  (Normal) Collected: 12/10/21 1237    Lab Status: Final result Specimen: Swab from Nasopharynx Updated: 12/10/21 1317     COVID19 Not Detected     Influenza A PCR Not Detected     Influenza B PCR Not Detected    Narrative:      Fact sheet for providers: https://www.fda.gov/media/393898/download    Fact sheet for patients: https://www.fda.gov/media/319534/download    Test performed by PCR.          XR Chest 1 View    Result Date: 12/17/2021  CR Chest 1 Vw INDICATION: Shortness of air and fever today. COMPARISON:  Chest 10/28/2021 FINDINGS: Portable AP view(s) of the chest.  There are hazy opacities throughout the right upper lobe and right lung base. Chronic elevation of the right hemidiaphragm. Left  lung appears clear. Heart size is upper limits. Mediastinum is unremarkable. Tracheostomy tube again noted.     Impression: Hazy opacities in the right upper lobe and right lung base. Pneumonia is not excluded in the appropriate clinical setting. Signer Name: Keith Dykes MD  Signed: 12/17/2021 9:25 PM  Workstation Name: [email or web address]  [facility]          Assessment/Plan   Assessment & Plan       Sepsis (HCC)    Pneumonia of right upper lobe due to infectious organism    COPD (chronic obstructive pulmonary disease) (HCC)    DM (diabetes mellitus) (HCC)    Essential hypertension    GERD without esophagitis    Epilepsy (HCC)    Schizoaffective disorder (HCC)    History of anoxic brain injury      1. Sepsis secondary to pneumonia:  - suspect aspiration, but will cover for HCAP. Check UA  - on Merrem and vancomycin. Continue  - Solumedrol 80 mg X1. Consider scheduled as needed  - blood cultures, sputum culture, COVID PCR, urine antigens  - SLP treat and eval  - NT suctioning, tracheostomy care  - robinul as needed for secretions  - am labs    2. COPD  - with chronic respiratory failure  - duo nebs with additional pulmonary toilet as needed    3. DM2:  - on 10 units insulin daily. Hold for now  - SSI with scheduled accu checks  - check HbA1c    4. Hypertension:  - stable. Continue norvasc, lopressor and lasix    5. Seizure Disorder:  - history of CP  - on keppra 500 mg q 12 hours  - seizure precuations    6. Failure to thrive:  - consult to palliative for goals of care  - PT/OT/ nutrition    DVT prophylaxis: STORM    CODE STATUS:  Full code  Medical Intervention Limits: NO intubation (DNI)  Level Of Support Discussed With: Health Care Surrogate  Code Status (Patient has no pulse and is not breathing): No CPR (Do Not Attempt to Resuscitate)  Medical Interventions (Patient has pulse or is breathing): Limited Support      This note has been completed as part of a split-shared workflow.      Electronically signed by Erik Dorsey PA-C, 12/17/21, 11:48 PM EST.      Brief Attending Admission Attestation     I have seen and examined the patient, performing an independent face-to-face diagnostic evaluation with plan of care reviewed and developed with the advanced practice clinician (APC).    Old records reviewed and summarized in PM hx.    Brief Summary Statement:  61 Y F, bed bound, disabeled, [place] of state, here with Resp distress, fever, with lot of thick, sticky mucus, coming out of Trach.  Pt is aphasic, occ opens her eyes to verbal commands.  Sent for fever Resp distress,    In ED patient got Merrem, RL 2.2 L, Tylenol 650 mg  Remainder of detailed HPI is as noted above and has been reviewed and/or edited by me for completeness.    PM HX:  Hypertension-Norvasc 10 mg, Lopressor 50 mg every 12  CAD? -aspirin 325 mg  COPD-Pulmicort  CHF? -Lasix 20 mg every 12  Chronic pain-Norco 5 mg as needed  Seizure disorder-Keppra  Chronic constipation  DM2  Hypothyroid.  Anoxic brain injury/in LTAC facility-with aphasia, contractures of upper/lower extremity, chronic trach-baseline oxygen requirement around 35% FiO2  Patient was admitted to [place] in October 2021-for possible aspiration pneumonia.    Vitals:    12/17/21 1932   BP:  147/95   Pulse:  130   Resp: 24   Temp: (!) 101.1 °F (38.4 °C)   SpO2:  96% on room air       Attending Physical Exam:  RS- BL coarse exp crackles , with wheezing.  CVS- s1s2 regular, tachycardic, no murmur.  ABD-distended, with PEG.  EXT-contracted, with mild Gen edema.  NEURO- aphasic, lethargic, not following verbal commands.    LABS:  Troponin-less than 0.01, proBNP 2600  Blood sugar-219,  Elevated LFTs alk phos 170, ALT 34, AST 17, total bili 0.5-continue to monitor  Lactic acid 1.4  Pro-Torrey 0.1  WBC of 16, hemoglobin of 14, neutrophils of 77  Respiratory panel including Covid-negative  Chest x-ray-hazy opacities in the right upper lobe/right lung base  EKG-sinus tach 121  bpm, , incomplete RBB, poor R wave progression in the lateral leads.    Brief Assessment/Plan  Hyperglycemia-fs coverage.  Hypokalemia-replace potassium, will add on magnesium  Zosyn, frequent suction, poor prognosis, hospice consult.  UA-chronic soto, still pending.    See above for further detailed assessment and plan developed with APC which I have reviewed and/or edited for completeness.      Admission Status: I believe that this patient meets inpt status, due to aspiration Pnm.              Electronically signed by Pam Nicholson MD at 12/18/21 0017

## 2021-12-22 NOTE — PLAN OF CARE
Problem: Adult Inpatient Plan of Care  Goal: Plan of Care Review  Flowsheets (Taken 12/22/2021 1042)  Plan of Care Reviewed With:   guardian   son  Outcome Summary: State Guardianship approval for comfort measures/hospice care/termination of lifeprolonging treatment received and faxed to HIM for inclusion in medical record.  Code status/level of intervention changed to reflect comfort measures, inpatient hospice consulted for end of life care.  Discussions with guardianship worker Cherry Yarbrough and patient's estranged son Jonel Crump.  Jonel and his wife are enroute to visit patient today.  Palliative will contine to follow for support and assist with plan of care as possible.     Problem: Palliative Care  Goal: Enhanced Quality of Life  Intervention: Promote Advance Care Planning  Recent Flowsheet Documentation  Taken 12/22/2021 1042 by Desire Ferreira MSW  Life Transition/Adjustment: (comfort measures, inpt hospice consulted) end-of-life care initiated  Intervention: Optimize Psychosocial Wellbeing  Recent Flowsheet Documentation  Taken 12/22/2021 1042 by Desire Ferreira MSW  Supportive Measures: (discussion with patient's estranged son as well as guardianship worker)   active listening utilized   counseling provided   decision-making supported   self-care encouraged   verbalization of feelings encouraged  Family/Support System Care:   support provided   presence promoted   caregiver stress acknowledged

## 2021-12-22 NOTE — NURSING NOTE
Woc follow up for hay scale < or = to 14    Current Hay Score: 11    Skin issues: Unable to reach RN today about skin issues but patient is on Dolphin with ALEX surface.  Please contact Woc if any skin integrity issues arise.    Specialty bed ordered: Dolphin ALEX.    Pressure Injury Protocol (initiate for Hay Score of 18 or less):   *Maintain good skin care, keep dry, turn q 2 hr, keep heels elevated and offloaded with heel boots.    *Apply z-guard to sacrococcygeal area/ perineal area BID or daily and PRN per incontinent episodes.  *Follow C.A.R.E protocol if medical devices (Bipap, soto, Ng tube, etc) are being used.     Please contact wo with any new questions or concerns.    Dragon disclaimer:  This note was entered via electronic voice recognition/ transcription prorgram. The electronic translation of spoken language may permit erroneous, or at times, nonsensical words or phrases to be inadvertently transcribed; Although I have reviewed the note for such errors, some may still exist.

## 2021-12-23 NOTE — DISCHARGE SUMMARY
[facility] Medicine Services  DISCHARGE TO INPATIENT HOSPICE    Patient Name: Yulisa Valdez  : 1960  MRN: [id number]    Date of Admission: 2021  Date of Discharge:  2021 (Wednesday)  Primary Care Physician: Ester Moya MD    Consults     Date and Time Order Name Status Description    2021 10:58 AM Inpatient Palliative Care MD Consult Completed         Cali Berry DO - Palliative Medicine    Hospital Course     Presenting Problem:   Sepsis (HCC) [A41.9]    Active Hospital Problems    Diagnosis  POA   • **Sepsis (HCC) [A41.9]  Yes   • COPD (chronic obstructive pulmonary disease) ([facility]) [J44.9]  Yes   • GERD without esophagitis [K21.9]  Yes   • Epilepsy ([facility]) [G40.909]  Yes   • Schizoaffective disorder (HCC) [F25.9]  Yes   • DM (diabetes mellitus) ([facility]) [E11.9]  Yes   • Pneumonia of right upper lobe due to infectious organism [J18.9]  Yes   • History of anoxic brain injury [Z86.69]  Not Applicable   • Essential hypertension [I10]  Yes   • Hypothyroidism [E03.9]  Yes   • Cerebral palsy (HCC) [G80.9]  Yes      Resolved Hospital Problems   No resolved problems to display.          Hospital Course:  Yulisa Valdez is a 61 y.o. female with PMH of chronic respiratory failure with COPD, cerebral palsy with epilepsy, anoxic brain injury, DM2, hypothyroidism, GERD, and tracheostomy and PEG tube placement. She presented from [facility] in acute respiratory distress with fever. Records reviewed indicate that patient has recurrent aspiration pneumonia for which she was recently admitted to [facility] in October. She was then transferred to LTAC. 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      Sepsis  Aspiration Pneumonia  Acute on Chronic Respiratory Failure  COPD   -Respiratory panel negative  -blood cultures NG at 2 days   -merrem course complete   -sputum culture normal ze  -urine ag neg    -duo neb  -solumedrol 40mg BID    -trach  care     Hypernatremia   -Na 147   -holding tube feeds for aspiration      DM II  -SSI  -a1c 6.5     Seizure disorder  Hx CP with epilepsy and anoxic brain injury  -cont keppra     HTN  -home meds, includes lasix     Day of Discharge     HPI:    Discharged to Hospice    ROS:   n/a    Vital Signs:   Temp:  [98.1 °F (36.7 °C)-98.5 °F (36.9 °C)] 98.3 °F (36.8 °C)  Heart Rate:  [55-93] 80  Resp:  [16-18] 18  BP: (132-152)/(75-93) 132/93     Physical Exam:   n/a    Discharge Details     Discharge Disposition:  Transfer care to inpatient Hospice at [facility]    Time Spent on Discharge:  20 minutes    Zachery Canales MD  12/22/21

## 2022-01-01 PROCEDURE — 25010000002 FUROSEMIDE PER 20 MG: Performed by: NURSE PRACTITIONER

## 2022-01-01 PROCEDURE — 25010000002 MORPHINE PER 10 MG: Performed by: NURSE PRACTITIONER

## 2022-01-01 PROCEDURE — 25010000002 LORAZEPAM PER 2 MG: Performed by: NURSE PRACTITIONER

## 2022-01-01 PROCEDURE — 94799 UNLISTED PULMONARY SVC/PX: CPT

## 2022-01-01 RX ORDER — BISACODYL 10 MG
10 SUPPOSITORY, RECTAL RECTAL NIGHTLY
Status: COMPLETED | OUTPATIENT
Start: 2022-01-01 | End: 2022-01-01

## 2022-01-01 RX ORDER — MORPHINE SULFATE 4 MG/ML
4 INJECTION, SOLUTION INTRAMUSCULAR; INTRAVENOUS EVERY 4 HOURS
Status: DISCONTINUED | OUTPATIENT
Start: 2022-01-01 | End: 2022-01-01 | Stop reason: HOSPADM

## 2022-01-01 RX ORDER — LORAZEPAM 2 MG/ML
1 INJECTION INTRAMUSCULAR EVERY 4 HOURS
Status: DISCONTINUED | OUTPATIENT
Start: 2022-01-01 | End: 2022-01-01 | Stop reason: HOSPADM

## 2022-01-01 RX ORDER — MORPHINE SULFATE 4 MG/ML
4 INJECTION, SOLUTION INTRAMUSCULAR; INTRAVENOUS
Status: DISCONTINUED | OUTPATIENT
Start: 2022-01-01 | End: 2022-01-01 | Stop reason: HOSPADM

## 2022-01-01 RX ADMIN — POLYVINYL ALCOHOL 2 DROP: 14 SOLUTION/ DROPS OPHTHALMIC at 20:13

## 2022-01-01 RX ADMIN — LORAZEPAM 1 MG: 2 INJECTION INTRAMUSCULAR; INTRAVENOUS at 20:40

## 2022-01-01 RX ADMIN — LORAZEPAM 1 MG: 2 INJECTION INTRAMUSCULAR; INTRAVENOUS at 01:43

## 2022-01-01 RX ADMIN — LORAZEPAM 1 MG: 2 INJECTION INTRAMUSCULAR; INTRAVENOUS at 02:58

## 2022-01-01 RX ADMIN — DOCOSANOL 1 APPLICATION: 100 CREAM TOPICAL at 13:50

## 2022-01-01 RX ADMIN — MORPHINE SULFATE 4 MG: 4 INJECTION, SOLUTION INTRAMUSCULAR; INTRAVENOUS at 17:42

## 2022-01-01 RX ADMIN — POLYVINYL ALCOHOL 2 DROP: 14 SOLUTION/ DROPS OPHTHALMIC at 19:33

## 2022-01-01 RX ADMIN — POLYVINYL ALCOHOL 2 DROP: 14 SOLUTION/ DROPS OPHTHALMIC at 14:40

## 2022-01-01 RX ADMIN — MINERAL OIL: 1000 LIQUID ORAL at 05:47

## 2022-01-01 RX ADMIN — Medication: at 08:53

## 2022-01-01 RX ADMIN — GLYCOPYRROLATE 0.2 MG: 0.2 INJECTION INTRAMUSCULAR; INTRAVENOUS at 08:53

## 2022-01-01 RX ADMIN — LORAZEPAM 1 MG: 2 INJECTION INTRAMUSCULAR; INTRAVENOUS at 20:14

## 2022-01-01 RX ADMIN — GLYCOPYRROLATE 0.2 MG: 0.2 INJECTION INTRAMUSCULAR; INTRAVENOUS at 20:27

## 2022-01-01 RX ADMIN — MINERAL OIL: 1000 LIQUID ORAL at 01:58

## 2022-01-01 RX ADMIN — POLYVINYL ALCOHOL 2 DROP: 14 SOLUTION/ DROPS OPHTHALMIC at 14:15

## 2022-01-01 RX ADMIN — BISACODYL SUPPOSITORY 10 MG: 10 SUPPOSITORY RECTAL at 21:21

## 2022-01-01 RX ADMIN — FUROSEMIDE 20 MG: 10 INJECTION INTRAMUSCULAR; INTRAVENOUS at 13:50

## 2022-01-01 RX ADMIN — FUROSEMIDE 20 MG: 10 INJECTION INTRAMUSCULAR; INTRAVENOUS at 01:58

## 2022-01-01 RX ADMIN — MORPHINE SULFATE 4 MG: 4 INJECTION, SOLUTION INTRAMUSCULAR; INTRAVENOUS at 01:43

## 2022-01-01 RX ADMIN — MINERAL OIL: 1000 LIQUID ORAL at 08:53

## 2022-01-01 RX ADMIN — POLYVINYL ALCOHOL 2 DROP: 14 SOLUTION/ DROPS OPHTHALMIC at 08:02

## 2022-01-01 RX ADMIN — NYSTATIN: 100000 POWDER TOPICAL at 20:28

## 2022-01-01 RX ADMIN — LORAZEPAM 1 MG: 2 INJECTION INTRAMUSCULAR; INTRAVENOUS at 14:39

## 2022-01-01 RX ADMIN — NYSTATIN: 100000 POWDER TOPICAL at 08:45

## 2022-01-01 RX ADMIN — LORAZEPAM 1 MG: 2 INJECTION INTRAMUSCULAR; INTRAVENOUS at 06:36

## 2022-01-01 RX ADMIN — DOCOSANOL 1 APPLICATION: 100 CREAM TOPICAL at 21:04

## 2022-01-01 RX ADMIN — FUROSEMIDE 20 MG: 10 INJECTION INTRAMUSCULAR; INTRAVENOUS at 09:43

## 2022-01-01 RX ADMIN — LORAZEPAM 1 MG: 2 INJECTION, SOLUTION INTRAMUSCULAR; INTRAVENOUS at 21:17

## 2022-01-01 RX ADMIN — MORPHINE SULFATE 4 MG: 4 INJECTION, SOLUTION INTRAMUSCULAR; INTRAVENOUS at 08:25

## 2022-01-01 RX ADMIN — LORAZEPAM 1 MG: 2 INJECTION INTRAMUSCULAR; INTRAVENOUS at 07:48

## 2022-01-01 RX ADMIN — FUROSEMIDE 20 MG: 10 INJECTION INTRAMUSCULAR; INTRAVENOUS at 14:39

## 2022-01-01 RX ADMIN — GLYCOPYRROLATE 0.2 MG: 0.2 INJECTION INTRAMUSCULAR; INTRAVENOUS at 07:48

## 2022-01-01 RX ADMIN — MORPHINE SULFATE 4 MG: 4 INJECTION, SOLUTION INTRAMUSCULAR; INTRAVENOUS at 10:29

## 2022-01-01 RX ADMIN — MINERAL OIL: 1000 LIQUID ORAL at 05:51

## 2022-01-01 RX ADMIN — POLYVINYL ALCOHOL 2 DROP: 14 SOLUTION/ DROPS OPHTHALMIC at 20:59

## 2022-01-01 RX ADMIN — MORPHINE SULFATE 4 MG: 4 INJECTION, SOLUTION INTRAMUSCULAR; INTRAVENOUS at 14:49

## 2022-01-01 RX ADMIN — NYSTATIN: 100000 POWDER TOPICAL at 20:13

## 2022-01-01 RX ADMIN — GLYCOPYRROLATE 0.2 MG: 0.2 INJECTION INTRAMUSCULAR; INTRAVENOUS at 20:58

## 2022-01-01 RX ADMIN — NYSTATIN: 100000 POWDER TOPICAL at 08:25

## 2022-01-01 RX ADMIN — NYSTATIN: 100000 POWDER TOPICAL at 20:59

## 2022-01-01 RX ADMIN — MORPHINE SULFATE 4 MG: 4 INJECTION, SOLUTION INTRAMUSCULAR; INTRAVENOUS at 07:54

## 2022-01-01 RX ADMIN — MINERAL OIL: 1000 LIQUID ORAL at 01:43

## 2022-01-01 RX ADMIN — POLYVINYL ALCOHOL 2 DROP: 14 SOLUTION/ DROPS OPHTHALMIC at 13:33

## 2022-01-01 RX ADMIN — NYSTATIN: 100000 POWDER TOPICAL at 21:04

## 2022-01-01 RX ADMIN — FUROSEMIDE 20 MG: 10 INJECTION INTRAMUSCULAR; INTRAVENOUS at 20:13

## 2022-01-01 RX ADMIN — BISACODYL SUPPOSITORY 10 MG: 10 SUPPOSITORY RECTAL at 20:27

## 2022-01-01 RX ADMIN — NYSTATIN: 100000 POWDER TOPICAL at 20:40

## 2022-01-01 RX ADMIN — MINERAL OIL: 1000 LIQUID ORAL at 03:00

## 2022-01-01 RX ADMIN — MORPHINE SULFATE 4 MG: 4 INJECTION, SOLUTION INTRAMUSCULAR; INTRAVENOUS at 13:33

## 2022-01-01 RX ADMIN — MORPHINE SULFATE 4 MG: 4 INJECTION, SOLUTION INTRAMUSCULAR; INTRAVENOUS at 05:48

## 2022-01-01 RX ADMIN — POLYVINYL ALCOHOL 2 DROP: 14 SOLUTION/ DROPS OPHTHALMIC at 08:45

## 2022-01-01 RX ADMIN — LORAZEPAM 1 MG: 2 INJECTION INTRAMUSCULAR; INTRAVENOUS at 07:54

## 2022-01-01 RX ADMIN — SCOPALAMINE 1 PATCH: 1 PATCH, EXTENDED RELEASE TRANSDERMAL at 14:04

## 2022-01-01 RX ADMIN — MORPHINE SULFATE 4 MG: 4 INJECTION, SOLUTION INTRAMUSCULAR; INTRAVENOUS at 11:15

## 2022-01-01 RX ADMIN — DOCOSANOL 1 APPLICATION: 100 CREAM TOPICAL at 20:40

## 2022-01-01 RX ADMIN — BISACODYL SUPPOSITORY 10 MG: 10 SUPPOSITORY RECTAL at 19:33

## 2022-01-01 RX ADMIN — FUROSEMIDE 20 MG: 10 INJECTION INTRAMUSCULAR; INTRAVENOUS at 01:43

## 2022-01-01 RX ADMIN — MINERAL OIL: 1000 LIQUID ORAL at 14:14

## 2022-01-01 RX ADMIN — MINERAL OIL: 1000 LIQUID ORAL at 08:02

## 2022-01-01 RX ADMIN — Medication: at 07:49

## 2022-01-01 RX ADMIN — MORPHINE SULFATE 4 MG: 4 INJECTION, SOLUTION INTRAMUSCULAR; INTRAVENOUS at 14:05

## 2022-01-01 RX ADMIN — FUROSEMIDE 20 MG: 10 INJECTION INTRAMUSCULAR; INTRAVENOUS at 07:48

## 2022-01-01 RX ADMIN — Medication: at 10:29

## 2022-01-01 RX ADMIN — NYSTATIN: 100000 POWDER TOPICAL at 07:49

## 2022-01-01 RX ADMIN — POLYVINYL ALCOHOL 2 DROP: 14 SOLUTION/ DROPS OPHTHALMIC at 07:49

## 2022-01-01 RX ADMIN — LORAZEPAM 1 MG: 2 INJECTION INTRAMUSCULAR; INTRAVENOUS at 14:16

## 2022-01-01 RX ADMIN — LORAZEPAM 1 MG: 2 INJECTION, SOLUTION INTRAMUSCULAR; INTRAVENOUS at 05:47

## 2022-01-01 RX ADMIN — MINERAL OIL: 1000 LIQUID ORAL at 23:05

## 2022-01-01 RX ADMIN — FUROSEMIDE 20 MG: 10 INJECTION INTRAMUSCULAR; INTRAVENOUS at 02:11

## 2022-01-01 RX ADMIN — POLYVINYL ALCOHOL 2 DROP: 14 SOLUTION/ DROPS OPHTHALMIC at 01:07

## 2022-01-01 RX ADMIN — MORPHINE SULFATE 4 MG: 4 INJECTION, SOLUTION INTRAMUSCULAR; INTRAVENOUS at 01:58

## 2022-01-01 RX ADMIN — MORPHINE SULFATE 4 MG: 4 INJECTION, SOLUTION INTRAMUSCULAR; INTRAVENOUS at 13:50

## 2022-01-01 RX ADMIN — LORAZEPAM 1 MG: 2 INJECTION INTRAMUSCULAR; INTRAVENOUS at 20:58

## 2022-01-01 RX ADMIN — POLYVINYL ALCOHOL 2 DROP: 14 SOLUTION/ DROPS OPHTHALMIC at 01:59

## 2022-01-01 RX ADMIN — MORPHINE SULFATE 4 MG: 4 INJECTION, SOLUTION INTRAMUSCULAR; INTRAVENOUS at 20:40

## 2022-01-01 RX ADMIN — FUROSEMIDE 20 MG: 10 INJECTION INTRAMUSCULAR; INTRAVENOUS at 06:40

## 2022-01-01 RX ADMIN — MINERAL OIL: 1000 LIQUID ORAL at 20:40

## 2022-01-01 RX ADMIN — MINERAL OIL: 1000 LIQUID ORAL at 21:20

## 2022-01-01 RX ADMIN — LORAZEPAM 1 MG: 2 INJECTION, SOLUTION INTRAMUSCULAR; INTRAVENOUS at 10:30

## 2022-01-01 RX ADMIN — MORPHINE SULFATE 4 MG: 4 INJECTION, SOLUTION INTRAMUSCULAR; INTRAVENOUS at 14:14

## 2022-01-01 RX ADMIN — LORAZEPAM 1 MG: 2 INJECTION INTRAMUSCULAR; INTRAVENOUS at 14:05

## 2022-01-01 RX ADMIN — DOCOSANOL 1 APPLICATION: 100 CREAM TOPICAL at 08:45

## 2022-01-01 RX ADMIN — GLYCOPYRROLATE 0.2 MG: 0.2 INJECTION INTRAMUSCULAR; INTRAVENOUS at 08:09

## 2022-01-01 RX ADMIN — POLYVINYL ALCOHOL 2 DROP: 14 SOLUTION/ DROPS OPHTHALMIC at 07:55

## 2022-01-01 RX ADMIN — MINERAL OIL: 1000 LIQUID ORAL at 21:04

## 2022-01-01 RX ADMIN — POLYVINYL ALCOHOL 2 DROP: 14 SOLUTION/ DROPS OPHTHALMIC at 02:46

## 2022-01-01 RX ADMIN — FUROSEMIDE 20 MG: 10 INJECTION INTRAMUSCULAR; INTRAVENOUS at 14:14

## 2022-01-01 RX ADMIN — MORPHINE SULFATE 4 MG: 4 INJECTION, SOLUTION INTRAMUSCULAR; INTRAVENOUS at 19:33

## 2022-01-01 RX ADMIN — GLYCOPYRROLATE 0.2 MG: 0.2 INJECTION INTRAMUSCULAR; INTRAVENOUS at 21:03

## 2022-01-01 RX ADMIN — FUROSEMIDE 20 MG: 10 INJECTION INTRAMUSCULAR; INTRAVENOUS at 08:52

## 2022-01-01 RX ADMIN — DOCOSANOL 1 APPLICATION: 100 CREAM TOPICAL at 19:33

## 2022-01-01 RX ADMIN — MORPHINE SULFATE 4 MG: 4 INJECTION, SOLUTION INTRAMUSCULAR; INTRAVENOUS at 07:11

## 2022-01-01 RX ADMIN — MINERAL OIL: 1000 LIQUID ORAL at 20:59

## 2022-01-01 RX ADMIN — MINERAL OIL: 1000 LIQUID ORAL at 13:33

## 2022-01-01 RX ADMIN — LORAZEPAM 1 MG: 2 INJECTION INTRAMUSCULAR; INTRAVENOUS at 18:13

## 2022-01-01 RX ADMIN — MORPHINE SULFATE 4 MG: 4 INJECTION, SOLUTION INTRAMUSCULAR; INTRAVENOUS at 02:06

## 2022-01-01 RX ADMIN — DOCOSANOL 1 APPLICATION: 100 CREAM TOPICAL at 14:39

## 2022-01-01 RX ADMIN — MINERAL OIL: 1000 LIQUID ORAL at 17:55

## 2022-01-01 RX ADMIN — Medication: at 21:00

## 2022-01-01 RX ADMIN — Medication: at 23:05

## 2022-01-01 RX ADMIN — DOCOSANOL 1 APPLICATION: 100 CREAM TOPICAL at 21:14

## 2022-01-01 RX ADMIN — FUROSEMIDE 20 MG: 10 INJECTION INTRAMUSCULAR; INTRAVENOUS at 03:00

## 2022-01-01 RX ADMIN — NYSTATIN 1 APPLICATION: 100000 POWDER TOPICAL at 21:20

## 2022-01-01 RX ADMIN — POLYVINYL ALCOHOL 2 DROP: 14 SOLUTION/ DROPS OPHTHALMIC at 02:12

## 2022-01-01 RX ADMIN — DOCOSANOL 1 APPLICATION: 100 CREAM TOPICAL at 08:53

## 2022-01-01 RX ADMIN — LORAZEPAM 1 MG: 2 INJECTION INTRAMUSCULAR; INTRAVENOUS at 02:11

## 2022-01-01 RX ADMIN — MORPHINE SULFATE 4 MG: 4 INJECTION, SOLUTION INTRAMUSCULAR; INTRAVENOUS at 07:48

## 2022-01-01 RX ADMIN — GLYCOPYRROLATE 0.2 MG: 0.2 INJECTION INTRAMUSCULAR; INTRAVENOUS at 09:45

## 2022-01-01 RX ADMIN — POLYVINYL ALCOHOL 2 DROP: 14 SOLUTION/ DROPS OPHTHALMIC at 14:16

## 2022-01-01 RX ADMIN — DOCOSANOL 1 APPLICATION: 100 CREAM TOPICAL at 09:46

## 2022-01-01 RX ADMIN — SCOPALAMINE 1 PATCH: 1 PATCH, EXTENDED RELEASE TRANSDERMAL at 13:34

## 2022-01-01 RX ADMIN — MORPHINE SULFATE 4 MG: 4 INJECTION, SOLUTION INTRAMUSCULAR; INTRAVENOUS at 02:46

## 2022-01-01 RX ADMIN — NYSTATIN: 100000 POWDER TOPICAL at 08:53

## 2022-01-01 RX ADMIN — LORAZEPAM 1 MG: 2 INJECTION, SOLUTION INTRAMUSCULAR; INTRAVENOUS at 02:28

## 2022-01-01 RX ADMIN — GLYCOPYRROLATE 0.2 MG: 0.2 INJECTION INTRAMUSCULAR; INTRAVENOUS at 08:01

## 2022-01-01 RX ADMIN — LORAZEPAM 1 MG: 2 INJECTION, SOLUTION INTRAMUSCULAR; INTRAVENOUS at 13:33

## 2022-01-01 RX ADMIN — DOCOSANOL 1 APPLICATION: 100 CREAM TOPICAL at 01:07

## 2022-01-01 RX ADMIN — GLYCOPYRROLATE 0.2 MG: 0.2 INJECTION INTRAMUSCULAR; INTRAVENOUS at 08:25

## 2022-01-01 RX ADMIN — MORPHINE SULFATE 4 MG: 4 INJECTION, SOLUTION INTRAMUSCULAR; INTRAVENOUS at 03:00

## 2022-01-01 RX ADMIN — POLYVINYL ALCOHOL 2 DROP: 14 SOLUTION/ DROPS OPHTHALMIC at 14:51

## 2022-01-01 RX ADMIN — LORAZEPAM 1 MG: 2 INJECTION INTRAMUSCULAR; INTRAVENOUS at 08:02

## 2022-01-01 RX ADMIN — POLYVINYL ALCOHOL 2 DROP: 14 SOLUTION/ DROPS OPHTHALMIC at 09:46

## 2022-01-01 RX ADMIN — MORPHINE SULFATE 4 MG: 4 INJECTION, SOLUTION INTRAMUSCULAR; INTRAVENOUS at 20:59

## 2022-01-01 RX ADMIN — MINERAL OIL: 1000 LIQUID ORAL at 07:54

## 2022-01-01 RX ADMIN — NYSTATIN: 100000 POWDER TOPICAL at 09:46

## 2022-01-01 RX ADMIN — FUROSEMIDE 20 MG: 10 INJECTION INTRAMUSCULAR; INTRAVENOUS at 21:03

## 2022-01-01 RX ADMIN — MORPHINE SULFATE 4 MG: 4 INJECTION, SOLUTION INTRAMUSCULAR; INTRAVENOUS at 14:39

## 2022-01-01 RX ADMIN — FUROSEMIDE 20 MG: 10 INJECTION INTRAMUSCULAR; INTRAVENOUS at 14:50

## 2022-01-01 RX ADMIN — POLYVINYL ALCOHOL 2 DROP: 14 SOLUTION/ DROPS OPHTHALMIC at 13:50

## 2022-01-01 RX ADMIN — DOCOSANOL 1 APPLICATION: 100 CREAM TOPICAL at 14:14

## 2022-01-01 RX ADMIN — DOCOSANOL 1 APPLICATION: 100 CREAM TOPICAL at 02:46

## 2022-01-01 RX ADMIN — LORAZEPAM 1 MG: 2 INJECTION INTRAMUSCULAR; INTRAVENOUS at 19:33

## 2022-01-01 RX ADMIN — MINERAL OIL: 1000 LIQUID ORAL at 09:46

## 2022-01-01 RX ADMIN — DOCOSANOL 1 APPLICATION: 100 CREAM TOPICAL at 08:25

## 2022-01-01 RX ADMIN — MINERAL OIL: 1000 LIQUID ORAL at 02:46

## 2022-01-01 RX ADMIN — MINERAL OIL: 1000 LIQUID ORAL at 02:06

## 2022-01-01 RX ADMIN — LORAZEPAM 1 MG: 2 INJECTION INTRAMUSCULAR; INTRAVENOUS at 01:07

## 2022-01-01 RX ADMIN — LORAZEPAM 1 MG: 2 INJECTION INTRAMUSCULAR; INTRAVENOUS at 14:14

## 2022-01-01 RX ADMIN — Medication 1 APPLICATION: at 21:21

## 2022-01-01 RX ADMIN — FUROSEMIDE 20 MG: 10 INJECTION INTRAMUSCULAR; INTRAVENOUS at 21:15

## 2022-01-01 RX ADMIN — MORPHINE SULFATE 4 MG: 4 INJECTION, SOLUTION INTRAMUSCULAR; INTRAVENOUS at 20:13

## 2022-01-01 RX ADMIN — Medication: at 09:44

## 2022-01-01 RX ADMIN — GLYCOPYRROLATE 0.2 MG: 0.2 INJECTION INTRAMUSCULAR; INTRAVENOUS at 20:14

## 2022-01-01 RX ADMIN — POLYVINYL ALCOHOL 2 DROP: 14 SOLUTION/ DROPS OPHTHALMIC at 14:05

## 2022-01-01 RX ADMIN — MORPHINE SULFATE 4 MG: 4 INJECTION, SOLUTION INTRAMUSCULAR; INTRAVENOUS at 21:19

## 2022-01-01 RX ADMIN — LORAZEPAM 1 MG: 2 INJECTION, SOLUTION INTRAMUSCULAR; INTRAVENOUS at 03:00

## 2022-01-01 RX ADMIN — FUROSEMIDE 20 MG: 10 INJECTION INTRAMUSCULAR; INTRAVENOUS at 13:33

## 2022-01-01 RX ADMIN — POLYVINYL ALCOHOL 2 DROP: 14 SOLUTION/ DROPS OPHTHALMIC at 20:40

## 2022-01-01 RX ADMIN — DOCOSANOL 1 APPLICATION: 100 CREAM TOPICAL at 07:49

## 2022-01-01 RX ADMIN — MINERAL OIL: 1000 LIQUID ORAL at 20:13

## 2022-01-01 RX ADMIN — MINERAL OIL: 1000 LIQUID ORAL at 17:42

## 2022-01-01 RX ADMIN — POLYVINYL ALCOHOL 2 DROP: 14 SOLUTION/ DROPS OPHTHALMIC at 03:00

## 2022-01-01 RX ADMIN — NYSTATIN: 100000 POWDER TOPICAL at 08:02

## 2022-01-01 RX ADMIN — Medication: at 09:47

## 2022-01-01 RX ADMIN — FUROSEMIDE 20 MG: 10 INJECTION INTRAMUSCULAR; INTRAVENOUS at 01:07

## 2022-01-01 RX ADMIN — MINERAL OIL: 1000 LIQUID ORAL at 02:33

## 2022-01-01 RX ADMIN — NYSTATIN: 100000 POWDER TOPICAL at 09:44

## 2022-01-01 RX ADMIN — DOCOSANOL 1 APPLICATION: 100 CREAM TOPICAL at 14:05

## 2022-01-01 RX ADMIN — LORAZEPAM 1 MG: 2 INJECTION, SOLUTION INTRAMUSCULAR; INTRAVENOUS at 17:54

## 2022-01-01 RX ADMIN — GLYCOPYRROLATE 0.2 MG: 0.2 INJECTION INTRAMUSCULAR; INTRAVENOUS at 08:44

## 2022-01-01 RX ADMIN — DOCOSANOL 1 APPLICATION: 100 CREAM TOPICAL at 08:09

## 2022-01-01 RX ADMIN — FUROSEMIDE 20 MG: 10 INJECTION INTRAMUSCULAR; INTRAVENOUS at 20:27

## 2022-01-01 RX ADMIN — LORAZEPAM 1 MG: 2 INJECTION, SOLUTION INTRAMUSCULAR; INTRAVENOUS at 05:48

## 2022-01-01 RX ADMIN — MORPHINE SULFATE 4 MG: 4 INJECTION, SOLUTION INTRAMUSCULAR; INTRAVENOUS at 23:04

## 2022-01-01 RX ADMIN — Medication: at 10:20

## 2022-01-01 RX ADMIN — LORAZEPAM 1 MG: 2 INJECTION, SOLUTION INTRAMUSCULAR; INTRAVENOUS at 17:42

## 2022-01-01 RX ADMIN — LORAZEPAM 1 MG: 2 INJECTION INTRAMUSCULAR; INTRAVENOUS at 14:49

## 2022-01-01 RX ADMIN — DOCOSANOL 1 APPLICATION: 100 CREAM TOPICAL at 03:00

## 2022-01-01 RX ADMIN — DOCOSANOL 1 APPLICATION: 100 CREAM TOPICAL at 07:55

## 2022-01-01 RX ADMIN — MORPHINE SULFATE 4 MG: 4 INJECTION, SOLUTION INTRAMUSCULAR; INTRAVENOUS at 02:11

## 2022-01-01 RX ADMIN — GLYCOPYRROLATE 0.2 MG: 0.2 INJECTION INTRAMUSCULAR; INTRAVENOUS at 19:33

## 2022-01-01 RX ADMIN — GLYCOPYRROLATE 0.2 MG: 0.2 INJECTION INTRAMUSCULAR; INTRAVENOUS at 21:16

## 2022-01-01 RX ADMIN — POLYVINYL ALCOHOL 2 DROP: 14 SOLUTION/ DROPS OPHTHALMIC at 21:04

## 2022-01-01 RX ADMIN — POLYVINYL ALCOHOL 2 DROP: 14 SOLUTION/ DROPS OPHTHALMIC at 21:20

## 2022-01-01 RX ADMIN — MINERAL OIL: 1000 LIQUID ORAL at 14:05

## 2022-01-01 RX ADMIN — MORPHINE SULFATE 4 MG: 4 INJECTION, SOLUTION INTRAMUSCULAR; INTRAVENOUS at 17:55

## 2022-01-01 RX ADMIN — MINERAL OIL: 1000 LIQUID ORAL at 14:40

## 2022-01-01 RX ADMIN — DOCOSANOL 1 APPLICATION: 100 CREAM TOPICAL at 01:43

## 2022-01-01 RX ADMIN — POLYVINYL ALCOHOL 2 DROP: 14 SOLUTION/ DROPS OPHTHALMIC at 08:25

## 2022-01-01 RX ADMIN — POLYVINYL ALCOHOL 2 DROP: 14 SOLUTION/ DROPS OPHTHALMIC at 08:10

## 2022-01-01 RX ADMIN — FUROSEMIDE 20 MG: 10 INJECTION INTRAMUSCULAR; INTRAVENOUS at 08:09

## 2022-01-01 RX ADMIN — LORAZEPAM 1 MG: 2 INJECTION INTRAMUSCULAR; INTRAVENOUS at 08:44

## 2022-01-01 RX ADMIN — MINERAL OIL: 1000 LIQUID ORAL at 19:33

## 2022-01-01 RX ADMIN — MORPHINE SULFATE 4 MG: 4 INJECTION, SOLUTION INTRAMUSCULAR; INTRAVENOUS at 08:53

## 2022-01-01 RX ADMIN — MORPHINE SULFATE 4 MG: 4 INJECTION, SOLUTION INTRAMUSCULAR; INTRAVENOUS at 21:03

## 2022-01-01 RX ADMIN — MINERAL OIL: 1000 LIQUID ORAL at 07:49

## 2022-01-01 RX ADMIN — POLYVINYL ALCOHOL 2 DROP: 14 SOLUTION/ DROPS OPHTHALMIC at 08:53

## 2022-01-01 RX ADMIN — LORAZEPAM 1 MG: 2 INJECTION INTRAMUSCULAR; INTRAVENOUS at 13:50

## 2022-01-01 RX ADMIN — MINERAL OIL: 1000 LIQUID ORAL at 13:50

## 2022-01-01 RX ADMIN — POLYVINYL ALCOHOL 2 DROP: 14 SOLUTION/ DROPS OPHTHALMIC at 02:34

## 2022-01-01 RX ADMIN — FUROSEMIDE 20 MG: 10 INJECTION INTRAMUSCULAR; INTRAVENOUS at 14:05

## 2022-01-01 RX ADMIN — FUROSEMIDE 20 MG: 10 INJECTION INTRAMUSCULAR; INTRAVENOUS at 08:44

## 2022-01-01 RX ADMIN — MORPHINE SULFATE 4 MG: 4 INJECTION, SOLUTION INTRAMUSCULAR; INTRAVENOUS at 05:47

## 2022-01-01 RX ADMIN — FUROSEMIDE 20 MG: 10 INJECTION INTRAMUSCULAR; INTRAVENOUS at 02:06

## 2022-01-01 RX ADMIN — MINERAL OIL: 1000 LIQUID ORAL at 10:30

## 2022-01-01 RX ADMIN — GLYCOPYRROLATE 0.2 MG: 0.2 INJECTION INTRAMUSCULAR; INTRAVENOUS at 07:55

## 2022-01-01 RX ADMIN — MORPHINE SULFATE 4 MG: 4 INJECTION, SOLUTION INTRAMUSCULAR; INTRAVENOUS at 02:33

## 2022-01-01 RX ADMIN — MINERAL OIL: 1000 LIQUID ORAL at 01:08

## 2022-01-01 RX ADMIN — DOCOSANOL 1 APPLICATION: 100 CREAM TOPICAL at 14:15

## 2022-01-01 RX ADMIN — DOCOSANOL 1 APPLICATION: 100 CREAM TOPICAL at 02:12

## 2022-01-01 RX ADMIN — MORPHINE SULFATE 4 MG: 4 INJECTION, SOLUTION INTRAMUSCULAR; INTRAVENOUS at 01:07

## 2022-01-01 RX ADMIN — DOCOSANOL 1 APPLICATION: 100 CREAM TOPICAL at 14:50

## 2022-01-01 RX ADMIN — LORAZEPAM 1 MG: 2 INJECTION INTRAMUSCULAR; INTRAVENOUS at 09:44

## 2022-01-01 RX ADMIN — FUROSEMIDE 20 MG: 10 INJECTION INTRAMUSCULAR; INTRAVENOUS at 14:16

## 2022-01-01 RX ADMIN — DOCOSANOL 1 APPLICATION: 100 CREAM TOPICAL at 20:59

## 2022-01-01 RX ADMIN — MORPHINE SULFATE 4 MG: 4 INJECTION, SOLUTION INTRAMUSCULAR; INTRAVENOUS at 08:45

## 2022-01-01 RX ADMIN — NYSTATIN: 100000 POWDER TOPICAL at 08:09

## 2022-01-01 RX ADMIN — POLYVINYL ALCOHOL 2 DROP: 14 SOLUTION/ DROPS OPHTHALMIC at 02:06

## 2022-01-01 RX ADMIN — FUROSEMIDE 20 MG: 10 INJECTION INTRAMUSCULAR; INTRAVENOUS at 20:40

## 2022-01-01 RX ADMIN — DOCOSANOL 1 APPLICATION: 100 CREAM TOPICAL at 20:13

## 2022-01-01 RX ADMIN — FUROSEMIDE 20 MG: 10 INJECTION INTRAMUSCULAR; INTRAVENOUS at 08:01

## 2022-01-01 RX ADMIN — MINERAL OIL: 1000 LIQUID ORAL at 14:51

## 2022-01-01 RX ADMIN — FUROSEMIDE 20 MG: 10 INJECTION INTRAMUSCULAR; INTRAVENOUS at 02:46

## 2022-01-01 RX ADMIN — LORAZEPAM 1 MG: 2 INJECTION INTRAMUSCULAR; INTRAVENOUS at 21:03

## 2022-01-01 RX ADMIN — POLYVINYL ALCOHOL 2 DROP: 14 SOLUTION/ DROPS OPHTHALMIC at 01:43

## 2022-01-01 RX ADMIN — GLYCOPYRROLATE 0.2 MG: 0.2 INJECTION INTRAMUSCULAR; INTRAVENOUS at 20:40

## 2022-01-01 RX ADMIN — DOCOSANOL 1 APPLICATION: 100 CREAM TOPICAL at 13:33

## 2022-01-01 RX ADMIN — LORAZEPAM 1 MG: 2 INJECTION INTRAMUSCULAR; INTRAVENOUS at 02:46

## 2022-01-01 RX ADMIN — DOCOSANOL 1 APPLICATION: 100 CREAM TOPICAL at 02:05

## 2022-01-01 RX ADMIN — FUROSEMIDE 20 MG: 10 INJECTION INTRAMUSCULAR; INTRAVENOUS at 07:54

## 2022-01-01 RX ADMIN — FUROSEMIDE 20 MG: 10 INJECTION INTRAMUSCULAR; INTRAVENOUS at 19:33

## 2022-01-01 RX ADMIN — MORPHINE SULFATE 4 MG: 4 INJECTION, SOLUTION INTRAMUSCULAR; INTRAVENOUS at 02:58

## 2022-01-01 RX ADMIN — Medication: at 21:04

## 2022-01-01 RX ADMIN — FUROSEMIDE 20 MG: 10 INJECTION INTRAMUSCULAR; INTRAVENOUS at 20:57

## 2022-01-01 RX ADMIN — LORAZEPAM 1 MG: 2 INJECTION INTRAMUSCULAR; INTRAVENOUS at 08:52

## 2022-01-01 RX ADMIN — LORAZEPAM 1 MG: 2 INJECTION INTRAMUSCULAR; INTRAVENOUS at 08:25

## 2022-01-01 RX ADMIN — LORAZEPAM 1 MG: 2 INJECTION INTRAMUSCULAR; INTRAVENOUS at 01:58

## 2022-01-01 RX ADMIN — MORPHINE SULFATE 4 MG: 4 INJECTION, SOLUTION INTRAMUSCULAR; INTRAVENOUS at 10:03

## 2022-01-01 RX ADMIN — MINERAL OIL: 1000 LIQUID ORAL at 08:25

## 2022-01-01 RX ADMIN — FUROSEMIDE 20 MG: 10 INJECTION INTRAMUSCULAR; INTRAVENOUS at 08:25

## 2022-01-01 RX ADMIN — MORPHINE SULFATE 4 MG: 4 INJECTION, SOLUTION INTRAMUSCULAR; INTRAVENOUS at 14:18

## 2022-01-01 RX ADMIN — MINERAL OIL: 1000 LIQUID ORAL at 14:16

## 2022-01-01 RX ADMIN — LORAZEPAM 1 MG: 2 INJECTION INTRAMUSCULAR; INTRAVENOUS at 02:06

## 2022-01-01 RX ADMIN — SCOPALAMINE 1 PATCH: 1 PATCH, EXTENDED RELEASE TRANSDERMAL at 14:50

## 2022-01-01 RX ADMIN — LORAZEPAM 1 MG: 2 INJECTION, SOLUTION INTRAMUSCULAR; INTRAVENOUS at 23:04

## 2022-01-01 RX ADMIN — POLYVINYL ALCOHOL 2 DROP: 14 SOLUTION/ DROPS OPHTHALMIC at 20:28

## 2022-01-01 RX ADMIN — FUROSEMIDE 20 MG: 10 INJECTION INTRAMUSCULAR; INTRAVENOUS at 02:28

## 2022-01-01 RX ADMIN — MINERAL OIL: 1000 LIQUID ORAL at 02:12

## 2022-01-01 RX ADMIN — DOCOSANOL 1 APPLICATION: 100 CREAM TOPICAL at 01:59

## 2022-01-01 RX ADMIN — Medication: at 21:31

## 2022-01-01 NOTE — PROGRESS NOTES
Hospice Progress Note    Patient Name: Yulisa Valdez   : 1960  Gender: female    Code Status: comfort measures    Date of Admission: 2021    Subjective:  61 yoF admitted to hospice on 2021 with terminal diagnosis of sepsis.      Per nursing report, pt seems to be resting comfortably.  Over night she required PRN's below to manage her symptoms.  No new needs identified.     Upon assessment, pt is un disturbed with assessment. She has noted periods of apnea during assessment ~ 30-45 seconds. Breath sounds diminished throughout.  G tube in place, clamped.  Bowel sounds + x 4.  Extremities contracted x 4.  No family present today.      - Scheduled:  Robinul 0.2mg IV BID x 2   Lorazepam 1 mg IV q 6 hrs x 4   Morphine 4 mg IV q 6 hrs x 4   Lasix 20 mg IV q 6 hrs x 4   Scopolamine patch q 72 hrs x 1   Liquifilm q 6 hrs x 4    - PRNs:  Lasix 20 mg IV q 6 hrs PRN x1   Lorazepam 1 mg q 4 hrs PRN x 1   Morphine 4 mg q 1 hr PRN x 2     - Held:   None     - Intake/Output  *PO: NPO  *Urine: 4300 ml   *LBM: 2022      ROS:  Review of Systems   Unable to perform ROS: Acuity of condition       Reviewed current scheduled and prn medications for route, type, dose and frequency.     •  acetaminophen  •  bisacodyl  •  chlorhexidine  •  furosemide  •  glycopyrrolate  •  ketorolac  •  LORazepam  •  LORazepam  •  [DISCONTINUED] Morphine **OR** Morphine  •  ondansetron  •  polyvinyl alcohol    Objective:   /80 (BP Location: Right arm, Patient Position: Lying)   Pulse 72   Temp 98.9 °F (37.2 °C) (Axillary)   Resp 20   SpO2 94%      PPS: Palliative Performance Scale score as of 2022, 14:22 EST is 10% based on the following measures:   Ambulation: Totally bed bound  Activity and Evidence of Disease: Unable to do any work, extensive evidence of disease  Self-Care: Total care  Intake:  Mouth care only  LOC: Drowsy or coma    Physical Exam:  Physical Exam  Vitals and nursing note reviewed.   Constitutional:        Appearance: She is ill-appearing.   HENT:      Head: Normocephalic and atraumatic.      Mouth/Throat:      Mouth: Mucous membranes are moist.      Pharynx: Oropharynx is clear.   Cardiovascular:      Rate and Rhythm: Normal rate.   Pulmonary:      Breath sounds: Examination of the right-upper field reveals rhonchi. Examination of the left-upper field reveals rhonchi. Rhonchi present.      Comments: 30-45 second apnea noted during assessment  Abdominal:      General: Bowel sounds are normal.   Genitourinary:     Comments: Gao in place, draining dark urine to bedside drain.   Musculoskeletal:      Cervical back: Rigidity present.      Right lower leg: Edema present.      Left lower leg: Edema present.   Skin:     General: Skin is warm.   Neurological:      Mental Status: She is unresponsive.   Psychiatric:         Speech: She is noncommunicative.             Sepsis (HCC)      Assessment/Plan:   61 yoF admitted to hospice on 12/22/2021 with terminal diagnosis of sepsis.      -Continue comfort plan of care.     -Continue morphine 4 mg IV q 6 hrs scheduled and q 1 hr PRN for pain.     -Continue lorazepam 1 mg IV q 6 hrs scheduled and q 4 hrs PRN for anxiety/restlessness.     -Continue lasix 20 mg IV q 6 hrs scheduled and q 6 hrs PRN for fluid overload/secretions.     Symptoms:  PAIN  ANXIETY  RESTLESSNESS   SECRETIONS   SHORTNESS OF AIR   EOLC     Discharge Disposition: EOLC     Total Visit Time: 30 min   Face to Face Time: 10 min     Justification for care:  Patient meets criteria for acute in-patient care with required nursing assessment and interventions for symptoms with IV medications.      Angely Carrasco, MSN, APRN  [facility]  Hospice and Palliative Care Nurse Practitioner  01/01/22  14:10 EST

## 2022-01-01 NOTE — PLAN OF CARE
Goal Outcome Evaluation:           Progress: no change  Outcome Summary: Comfort measures maintained. Pt responsive to pain. No verbal response. Extremities contracted. Shallow, apneic breathing. Resp unlabored. Coarse rhonchi, suctioning as needed. Trach collar, 4/28. NPO. Light UOP in soto. Last BM 12/26. PEG clamped. Subq intact. PRN ativan given x2 for anxiety. PRN morphine given x1 for dyspnea. PRN lasix given x1 for excessive secretions. Fam not present during shift. Pt has appeared comfortable during the shift.

## 2022-01-01 NOTE — PLAN OF CARE
Problem: Skin Injury Risk Increased  Goal: Skin Health and Integrity  Outcome: Ongoing, Progressing  Intervention: Optimize Skin Protection  Recent Flowsheet Documentation  Taken 1/1/2022 0800 by Lino Mancia RN  Pressure Reduction Techniques:   frequent weight shift encouraged   weight shift assistance provided  Pressure Reduction Devices:   pressure-redistributing mattress utilized   specialty bed utilized  Skin Protection:   adhesive use limited   incontinence pads utilized     Problem: Fall Injury Risk  Goal: Absence of Fall and Fall-Related Injury  Outcome: Ongoing, Progressing  Intervention: Promote Injury-Free Environment  Recent Flowsheet Documentation  Taken 1/1/2022 0800 by Lino Mancia, RN  Safety Promotion/Fall Prevention:   safety round/check completed   toileting scheduled   Goal Outcome Evaluation:      Hospice, resting comfortably.

## 2022-01-01 NOTE — PROGRESS NOTES
Continued Stay Note  [facility]     Patient Name: Yulisa Valdez  MRN: [id number]  Today's Date: 1/1/2022    Admit Date: 12/22/2021     Discharge Plan     Row Name 01/01/22 0922       Plan    Plan Inpatient hospice    Plan Comments Patient resting comfortably with eyes closed.  Breathing even and unlabored. Prns required overnight: Lasix 20mg x1, Ativan 1mg x2, and Morphine 4mg x1.               Discharge Codes    No documentation.                     Phyllis Coronel RN

## 2022-01-02 NOTE — PROGRESS NOTES
Continued Stay Note  [facility]     Patient Name: Yulisa Valdez  MRN: [id number]  Today's Date: 1/2/2022    Admit Date: 12/22/2021     Discharge Plan     Row Name 01/02/22 1716       Plan    Plan Inpatient hospice    Plan Comments Visit to bedside.  Unresponsive. No eye opening, non verbal. Trach intact.  Shallow respirations with apnea.  Relaxed face. Contracted.  Noted coffee ground drainage around peg. Knees cool. Toes, feet cool and mottled.  Reassured patient that she is safe, cared for, not alone.  The patient continues to meet gip criteria due to requirments of injectable medication delivery for symptom management.  Current level of care unable to be maintained in alternate setting.  Should the patient's condition remain on current trajectory she will not be expected to survive this hospitalization.    Final Discharge Disposition Code 51 - hospice medical facility                             Desiree Foster RN

## 2022-01-02 NOTE — PLAN OF CARE
Goal Outcome Evaluation:           Progress: declining  Outcome Summary: No significant events. Patient remains unresponsive. Respirations shallow with periods of apnea. Trach collar 4L. Minimal tea colored UOP. PEG intact and clamped. Medications given as scheduled. No PRN meds this shift. Comfort measures and safety maintained. Continue to monitor.

## 2022-01-02 NOTE — PROGRESS NOTES
Hospice Progress Note    Patient Name: Yulisa Valdez   : 1960  Gender: female    Code Status: comfort measures    Date of Admission: 2021    Subjective:  61 yoF admitted to inpatient hopspice on 2021 with sepsis.    Per nursing report, patient has rested comfortably through the night.  Pt required 1 PRN dose of lorazepam for symptom management.  No family at bedside.  No new needs identified.     Pt appears comfortable during assessment.  She is unresponsive to stimulation.  No s/s of pain or distress noted.  Pt having extended periods of apnea during assessment.  Extremities contracted, cool to touch x 4.  Mottling present on BLE.      - Scheduled:  Morphine 4 mg IV q 6 hrs   Lorazepam 1 mg IV q 6 hrs   Robinul 0.2 mg BID  Lasix 20 mg q 6 hrs   Liquifilm   Palliative Oral Rinse     - PRNs:  Lorazepam 1 mg q 4 hrs PRN x 1     - Held:   None     - Intake/Output  *PO: NPO  *Urine: 65 ml   *LBM: 2021      ROS:  Review of Systems   Unable to perform ROS: Acuity of condition       Reviewed current scheduled and prn medications for route, type, dose and frequency.     •  acetaminophen  •  bisacodyl  •  chlorhexidine  •  furosemide  •  glycopyrrolate  •  LORazepam  •  LORazepam  •  Morphine  •  ondansetron  •  polyvinyl alcohol    Objective:   /80 (BP Location: Right arm, Patient Position: Lying)   Pulse 72   Temp 98.9 °F (37.2 °C) (Axillary)   Resp 20   SpO2 94%      PPS: Palliative Performance Scale score as of 2022, 16:34 EST is 10% based on the following measures:   Ambulation: Totally bed bound  Activity and Evidence of Disease: Unable to do any work, extensive evidence of disease  Self-Care: Total care  Intake:  Mouth care only  LOC: Drowsy or coma    Physical Exam:  Physical Exam  Vitals and nursing note reviewed.   Constitutional:       Appearance: She is ill-appearing.   HENT:      Head: Normocephalic.   Cardiovascular:      Rate and Rhythm: Bradycardia present.    Pulmonary:      Effort: Pulmonary effort is normal.      Breath sounds: Examination of the left-upper field reveals rhonchi. Examination of the right-middle field reveals decreased breath sounds. Examination of the left-middle field reveals decreased breath sounds. Examination of the right-lower field reveals decreased breath sounds. Examination of the left-lower field reveals decreased breath sounds. Decreased breath sounds and rhonchi present.      Comments: Long periods of apnea today.   Abdominal:      Palpations: Abdomen is soft.      Comments: G tube in place, clamped.    Genitourinary:     Comments: Gao present.   Musculoskeletal:      Cervical back: Rigidity present.      Comments: Extremities contracted x 4.    Skin:     General: Skin is cool.      Coloration: Skin is pale.      Comments: Extremites cool to touch x 4.    Neurological:      Mental Status: She is unresponsive.   Psychiatric:         Speech: She is noncommunicative.             Sepsis (HCC)      Assessment/Plan:   61 yoF admitted to inpatient hospice on 12/22/2021 with sepsis.     -Continue comfort focused POC.     -Continue scheduled and PRN morphine for pain.     -Continue scheduled and PRN lorazepam for anxiety/restlessness.         Symptoms:  PAIN  ANXIETY   RESTLESSNESS  SECRETIONS   EOLC     Discharge Disposition: EOLC     Total Visit Time: 30 min   Face to Face Time: 15 min     Justification for care:  Patient meets criteria for acute in-patient care with required nursing assessment and interventions for symptoms with IV medications.      Angely Carrasco, MSN, APRN  [facility]  Hospice and Palliative Care Nurse Practitioner  01/02/22  16:28 EST

## 2022-01-02 NOTE — PLAN OF CARE
Periods of apnea continue <45 seconds. Respirations shallow.  No restlessness noted. G tube in place, clamped.  Extremities contracted x 4.  Gao with scan urine output.

## 2022-01-03 NOTE — PROGRESS NOTES
Continued Stay Note  [facility]     Patient Name: Yulisa Valdez  MRN: [id number]  Today's Date: 1/3/2022    Admit Date: 12/22/2021     Discharge Plan     Row Name 01/03/22 1616       Plan    Plan Inpatient hospice visit    Plan Comments Visit to bedside.  Patient unresponsive without eye opening.  No movement noted.  Trach intact. Respirations shallow with apnea.  RR 12/min.  Relaxed, even, unlabored. Relaxed face, jaw.  Contracted upper and lower extremities.  PEG clamped with noted coffee ground drainage around site.  Feet, toes, fingertips mottled and cool.  RN reassures patient that she is safe, cared for.  The patient continues to meet gip criteria due to requirements of injectable medications in order to palliate symptoms.  Current level of care unable to be provided in alternate setting. She is actively dying.  Should patient's condition remain on current trajectory it is unlikely she will survive this hospitalization.    Final Discharge Disposition Code 51 - hospice medical facility                                    Desiree Foster RN

## 2022-01-03 NOTE — PLAN OF CARE
Problem: Adult Inpatient Plan of Care  Goal: Plan of Care Review  Flowsheets (Taken 1/3/2022 1970)  Progress: declining  Plan of Care Reviewed With: patient  Outcome Summary: RR remains apneic and shallow.  Pt appears comfortable with scheduled meds. No prns. Peg leaking browninsh black fluid. Congested cought-deep suctioned. Trach care completed.

## 2022-01-03 NOTE — PROGRESS NOTES
Hospice Progress Note    Patient Name: Yulisa Valdez   : 1960  Gender: female    Code Status: comfort measures    Date of Admission: 2021    Subjective:    61 yoF admitted to inpatient [facility] on 2021 with sepsis.    Overnight notes reviewed: RR remains apneic and shallow.  Pt appears comfortable with scheduled meds. No prns. Peg leaking browninsh black fluid. Congested cought-deep suctioned. Trach care completed.    Blinking eyes; comfortable; skin color changes noted Right side of face and BLE    - PRNs: none    - Held: none    - Intake/Output  *PO: NPO x12 days today  *Urine: 65ml, 25ml  *LBM: , small      ROS:  Review of Systems   Unable to perform ROS: Acuity of condition       Reviewed current scheduled and prn medications for route, type, dose and frequency.     •  acetaminophen  •  bisacodyl  •  chlorhexidine  •  furosemide  •  glycopyrrolate  •  LORazepam  •  LORazepam  •  Morphine  •  ondansetron  •  polyvinyl alcohol    Objective:   /80 (BP Location: Right arm, Patient Position: Lying)   Pulse 72   Temp 98.9 °F (37.2 °C) (Axillary)   Resp 20   SpO2 94%      PPS: 10%    Physical Exam:  Constitutional:       General: NAD. Eyes are closed. Pt comfortable and peaceful.   HENT:      Head: Normocephalic.      Mouth: MM kept moist with oral care  Eyes:      Comments: eyes remain closed  Neck:      Comments: Trach and collar in place  Cardiovascular:      Rate and Rhythm: DHT  Pulmonary:      Comments: Respirations are non-labored; A: CTA; O2 via trach, 6L. Mouth open, relaxed.  Abdominal:      Palpations: Abdomen is soft.      Comments: BSx4; PEG tube in place without s/s infection.   Musculoskeletal:         General: Deformity present.      Right lower leg: No edema.      Left lower leg: No edema.      Comments: Severe contractures of all extremities.   Skin:     General: Skin is dry. Face easily reddens with light wipe.   Neurological:      Comments: Does not follow  commands   Psychiatric:      Comments: no facial grimacing; no moaning      Sepsis (HCC)      Assessment/Plan:     61yoF admitted inpt Hospice on 12/22 for sepsis     Dyspnea  Congestion  AMS  Anxiety  Constipation  EOLC    Continued with O2 titration - now at 3L    Continue current plan of care    Monitor for changing needs    Coordinated care with Nursing and Hospice IDT      Discharge Disposition: EOLC    Total Visit Time: 15min  Face to Face Time: 15min    Justification for care:  Patient meets criteria for acute in-patient care with required nursing assessment and interventions for symptoms with IV medications.      Samreen Villeda, [place], MHA, APRN  [facility]  Hospice and Palliative Care Nurse Practitioner  01/03/22  13:20 EST

## 2022-01-04 NOTE — PROGRESS NOTES
Hospice Progress Note    Patient Name: Yulisa Valdez   : 1960  Gender: female    Code Status: comfort measures    Date of Admission: 2021    Subjective:    61 yoF admitted to inpatient [facility] on 2021 with sepsis.    Overnight notes reviewed: Comfort measures maintained. Pt minimally responsive, grimaces w turns. No verbal response. Extremities contracted. Shallow/apnea/unlabored. Coarse rhonchi, moderate output from suctioning this shift. Trach collar . NPO. Light soto output. Last BM . PEG clamped. Subq intact. Fam not present. Pt has appeared comfortable w scheduled meds this shift.      Pt remains comfortable; undisturbed by exam.     - PRNs: none    - Held: none    - Intake/Output  *PO: NPO x13 days today  *Urine: soto, straw color  *LBM:       ROS:  Review of Systems   Unable to perform ROS: Acuity of condition       Reviewed current scheduled and prn medications for route, type, dose and frequency.     •  acetaminophen  •  bisacodyl  •  chlorhexidine  •  furosemide  •  glycopyrrolate  •  LORazepam  •  LORazepam  •  Morphine  •  ondansetron  •  polyvinyl alcohol    Objective:   /80 (BP Location: Right arm, Patient Position: Lying)   Pulse 72   Temp 98.9 °F (37.2 °C) (Axillary)   Resp 20   SpO2 94%      PPS: 10%    Physical Exam:  Constitutional:       General: NAD. Comfortable.   HENT:      Head: Normocephalic.      Comments: Hair up in rubberband     Mouth: MM kept moist with oral care  Eyes:      Comments: closed  Neck:      Comments: Trach and collar in place  Cardiovascular:      Rate and Rhythm: DHT   Pulmonary:      Comments: Respirations are non-labored; O2 via trach.   Abdominal:      Palpations: Abdomen is soft.      Comments: Rounded abd; BSx4; PEG tube in place without s/s infection.   Musculoskeletal:         General: Deformity present.      Right lower leg: No edema.      Left lower leg: No edema.      Comments: Severe contractures of all  extremities.   Skin:     General: exposed skin is C/D/I     Coloration: Skin is pale   Neurological:      Comments: Does not follow commands; does cough occasionally; eyelids flutter when forehead is stroked or light is turned on  Psychiatric:      Comments: No facial grimacing; no moaning      Sepsis (HCC)      Assessment/Plan:     61yoF admitted in Hospice on 12/22 for sepsis     Dyspnea  Congestion  AMS  Anxiety  Constipation  EOLC     Continue current plan of care     Monitor for changing needs     Coordinated care with Nursing and Hospice IDT    Discharge Disposition: EOLC    Total Visit Time: 15min  Face to Face Time: 15min    Justification for care:  Patient meets criteria for acute in-patient care with required nursing assessment and interventions for symptoms with IV medications.      Samreen Villeda DNP, MHA, APRN  [facility]  Hospice and Palliative Care Nurse Practitioner  01/04/22  12:33 EST

## 2022-01-04 NOTE — PLAN OF CARE
Goal Outcome Evaluation:           Progress: declining  Outcome Summary: Comfort measures maintained. Pt minimally responsive, grimaces w turns. No verbal response. Extremities contracted. Shallow/apnea/unlabored. Coarse rhonchi, moderate output from suctioning this shift. Trach collar 4/28. NPO. Light soto output. Last BM 1/1. PEG clamped. Subq intact. Fam not present. Pt has appeared comfortable w scheduled meds this shift.

## 2022-01-05 NOTE — PROGRESS NOTES
Continued Stay Note  [facility]     Patient Name: Yulisa Valdez  MRN: [id number]  Today's Date: 1/4/2022    Admit Date: 12/22/2021     Discharge Plan     Row Name 01/04/22 1952       Plan    Plan Inpatient hospice    Plan Comments Actively dying. Unresponsive.  Relaxed face, jaw, body posture, respirations. Trach intact.  Warm to the touch. Mottled hands, feet, knees.  No visitors present. Reassured patient that she is safe, cared for.  Continues to require injectable medications for comfort making her eligible for inpatient hospice. Disposition noted remain hospice inpatient stay.    Final Discharge Disposition Code 51 - hospice medical facility                                    Desiree Foster RN

## 2022-01-05 NOTE — PLAN OF CARE
Goal Outcome Evaluation:           Progress: declining  Outcome Summary: Comfort measures maintained. Minimally responsive, grimaces w turns. No verbal response. Extremities contracted. Shallow/apnea/unlabored. Trach collar 4/28. NPO. MInimal soto output. Last BM 1/1. PEG clamped. Subq intact. Fam called for status update. Pt has appeared comfortable w scheduled meds.

## 2022-01-05 NOTE — PROGRESS NOTES
Continued Stay Note  [facility]     Patient Name: Yulisa Valdez  MRN: [id number]  Today's Date: 1/5/2022    Admit Date: 12/22/2021     Discharge Plan     Row Name 01/05/22 0826       Plan    Plan Inpatient hospice    Plan Comments Patient resting in bed with eyes partially open.  Hands cool.  Radial pulses weak, pedal not palpable.  Face, jaw relaxed.  Breathing unlabored, periods of apnea noted.  Gao patent and draining scant amount of winston urine.               Discharge Codes    No documentation.                     Phyllis Coronel RN

## 2022-01-05 NOTE — PROGRESS NOTES
Hospice Progress Note    Patient Name: Yulisa Valdez   : 1960  Gender: female    Code Status: comfort measures    Date of Admission: 2021    Subjective:    61 yoF admitted to inpatient [facility] on 2021 with sepsis.    Overnight notes reviewed: Comfort measures maintained. Minimally responsive, grimaces w turns. No verbal response. Extremities contracted. Shallow/apnea/unlabored. Trach collar . NPO. MInimal soto output. Last BM . PEG clamped. Subq intact. Fam called for status update. Pt has appeared comfortable w scheduled meds.    Pt remains comfortable today; occasional cough noted, nonproductive    Reviewed current scheduled and prn medications for route, type, dose and frequency.     •  acetaminophen  •  bisacodyl  •  chlorhexidine  •  furosemide  •  glycopyrrolate  •  LORazepam  •  LORazepam  •  Morphine  •  ondansetron  •  polyvinyl alcohol    Objective:   /80 (BP Location: Right arm, Patient Position: Lying)   Pulse 72   Temp 98.9 °F (37.2 °C) (Axillary)   Resp 20   SpO2 94%      PPS: 10%        Sepsis (HCC)      Assessment/Plan:     61yoF admitted inpt Hospice on  for sepsis     Dyspnea  Congestion  AMS  Anxiety  Constipation  EOLC    Changed to C today (Hospice specific designation)     Continue current plan of care     Monitor for changing needs     Coordinated care with Nursing and Hospice IDT        Discharge Disposition: EOLC      Samreen Villeda, [place], MHA, APRN  [facility]  Hospice and Palliative Care Nurse Practitioner  22  13:26 EST

## 2022-01-06 NOTE — PROGRESS NOTES
Hospice Progress Note    Patient Name: Yulisa Valdez   : 1960  Gender: female    Code Status: comfort measures    Date of Admission: 2021    Subjective:    61 yoF admitted to inpatient [facility] on 2021 with sepsis.    Overnight notes reviewed: Comfort measures maintained. Pt minimally responsive, grimaces/blinks w turns. No verbal. Extremities x4 contracted. Shallow/unlabored/apnea. Trach collar . Suctioning required this shift for secretions. NPO. Minimal soto output. Last BM , suppository given. PEG clamped. Subq intact. Fam not present. Pt has appeared comfortable w scheduled meds/rested well.    Hospice Nursing note: Patient resting with eyes closed.  Face and jaw relaxed.  Breathing, shallow, even, unlabored.  Feet mottled.  Lung sound, bowel sounds diminished.  O2 4L per trach collar.    Reviewed current scheduled and prn medications for route, type, dose and frequency.     •  acetaminophen  •  bisacodyl  •  chlorhexidine  •  furosemide  •  glycopyrrolate  •  LORazepam  •  LORazepam  •  Morphine  •  ondansetron  •  polyvinyl alcohol    Objective:   /80 (BP Location: Right arm, Patient Position: Lying)   Pulse 72   Temp 98.9 °F (37.2 °C) (Axillary)   Resp 20   SpO2 94%      PPS: 10%        Sepsis (HCC)      Assessment/Plan:     61yoF admitted inpt Hospice on  for sepsis     Dyspnea  Congestion  AMS  Anxiety  Constipation  EOLC    Routine home care (hospice specific designation)    Continue current plan of care    Monitor for changing needs    Discharge Disposition: EOLC    Samreen Villeda, [place], MHA, APRN  [facility]  Hospice and Palliative Care Nurse Practitioner  22  14:40 EST

## 2022-01-06 NOTE — PROGRESS NOTES
Continued Stay Note  [facility]     Patient Name: Yulisa Valdez  MRN: [id number]  Today's Date: 1/6/2022    Admit Date: 12/22/2021     Discharge Plan     Row Name 01/06/22 0849       Plan    Plan Inpatient hospice    Plan Comments Patient resting with eyes closed.  Face and jaw relaxed.  Breathing, shallow, even, unlabored.  Feet mottled.  Lung sound, bowel sounds diminished.  O2 4L per trach collar.               Discharge Codes    No documentation.                     Phyllis Coronel RN

## 2022-01-06 NOTE — PLAN OF CARE
Goal Outcome Evaluation:           Progress: no change  Outcome Summary: Comfort measures maintained. 4L 28% trach collar maintained. Pt coughing due to thick, creamy secretions from Trach; requiring suctioning. Gao in place with minimal output. Nothing PO except oral care. No visitors at bedside.

## 2022-01-06 NOTE — PLAN OF CARE
Goal Outcome Evaluation:           Progress: declining  Outcome Summary: Comfort measures maintained. Pt minimally responsive, grimaces/blinks w turns. No verbal. Extremities x4 contracted. Shallow/unlabored/apnea. Trach collar 4/28. Suctioning required this shift for secretions. NPO. Minimal soto output. Last BM 1/1, suppository given. PEG clamped. Subq intact. Fam not present. Pt has appeared comfortable w scheduled meds/rested well.

## 2022-01-07 NOTE — PLAN OF CARE
Patient has rested comfortably with scheduled medications, no prn medications given.    Skin integrity maintained.  Oral care continued.  Bath completed.  Will open eyes occasionally.    Respirations apneic, shallow and unlabored.    Minimal secretions.    O2 4L per trach collar with dressing CDI.   BLE cool, mottled.   Gao catheter with scant urine output- not enough to measure.

## 2022-01-07 NOTE — PROGRESS NOTES
Continued Stay Note  [facility]     Patient Name: Yulisa Valdez  MRN: [id number]  Today's Date: 1/7/2022    Admit Date: 12/22/2021     Discharge Plan     Row Name 01/07/22 0947       Plan    Plan Inpatient hospice    Plan Comments Patient resting comfortably at this time.  Periods of apnea noted.  Rhonchi ausculated.  Face and jaw relaxed.  Breathing unlabored.                Discharge Codes    No documentation.                     Phyllis Coronel RN

## 2022-01-07 NOTE — PLAN OF CARE
Goal Outcome Evaluation:           Progress: declining  Outcome Summary: Comfort measures maintained. 4L 28% trach collar maintained. Less secretions noted today. Gao in place with minimal output. Nothing PO except oral care. Pt responds to pain. Speciality bed in place.

## 2022-01-07 NOTE — PROGRESS NOTES
Hospice Progress Note    Date of Admission: 12/22/2021    Subjective:                   Questions for Current Code Status     Question Answer    Code Status (Patient has no pulse and is not breathing) No CPR (Do Not Attempt to Resuscitate)    Medical Interventions (Patient has pulse or is breathing) Comfort Measures        Scheduled Meds:bisacodyl, 10 mg, Rectal, Nightly  docosanol, 1 application, Topical, Q6H  furosemide, 20 mg, Intravenous, Q6H  glycopyrrolate, 0.2 mg, Intravenous, BID  LORazepam, 1 mg, Intravenous, Q4H  Morphine, 4 mg, Intravenous, Q4H  nystatin, , Topical, Q12H  palliative care oral rinse, , Mouth/Throat, Q4H  polyvinyl alcohol, 2 drop, Both Eyes, Q6H  Scopolamine, 1 patch, Transdermal, Q72H  zinc oxide, , Topical, BID      PRN Meds:.•  acetaminophen  •  bisacodyl  •  chlorhexidine  •  furosemide  •  glycopyrrolate  •  LORazepam  •  LORazepam  •  Morphine  •  ondansetron  •  polyvinyl alcohol      Objective: /80 (BP Location: Right arm, Patient Position: Lying)   Pulse 72   Temp 98.9 °F (37.2 °C) (Axillary)   Resp 20   SpO2 94%      Intake/Output Summary (Last 24 hours) at 1/7/2022 1817  Last data filed at 1/7/2022 0400  Gross per 24 hour   Intake --   Output 0 ml   Net 0 ml     Physical Exam:              Impression:   Plan:         Veda Laughlin MD  01/07/22

## 2022-01-08 NOTE — NURSING NOTE
Pt found not breathing at 0850. Hospice, house supervisor (nando) notified. State guardian (kristen monica) not reachable on the weekend. Notification message left for her with state guardian answering service ([phone]) and she will follow up Monday. Son (marry) notified of death, declined to view body here, will see her at  home.

## 2022-01-08 NOTE — PLAN OF CARE
Patient rested comfortably.  No PRNS required.   Respirations apneic, shallow, unlabored.    RLE cool.    Extremities dusky, cool, mottled.   Trach required no suctioning. 4L 28%  Gao- no urine output.   Skin integrity maintained.    Oral care continued. Bath completed.

## 2022-01-08 NOTE — SIGNIFICANT NOTE
Exam confirms with auscultation zero audible heart tones and zero audible respirations. Ms.Elizabeth Valdez was pronounced dead at 0850.  MD notified by Patient's RN.    Criselda Ho RN  Clinical House Supervisor  1/8/2022 09:12 EST

## 2022-01-11 NOTE — DISCHARGE SUMMARY
Date of Death:  1/8/2022  Time of Death:  0850    Presenting Problem/History of Present Illness    Sepsis (HCC)      Hospital Course    Per Hospitalist Discharge Summary:     Hospital Course:  Yulisa Valdez is a 61 y.o. female with PMH of chronic respiratory failure with COPD, cerebral palsy with epilepsy, anoxic brain injury, DM2, hypothyroidism, GERD, and tracheostomy and PEG tube placement. She presented from [facility] in acute respiratory distress with fever. Records reviewed indicate that patient has recurrent aspiration pneumonia for which she was recently admitted to [facility] in October. She was then transferred to LT[facility]. She is a mcmahon of the [facility] and is DNR/DNI.      Sepsis  Aspiration Pneumonia  Acute on Chronic Respiratory Failure  COPD   -Respiratory panel negative  -blood cultures NG at 2 days   -merrem course complete   -sputum culture normal ze  -urine ag neg    -duo neb  -solumedrol 40mg BID    -trach care     Hypernatremia   -Na 147   -holding tube feeds for aspiration      [end of copied text]     Ms. Valdez was admitted to [facility] on 12/22/2021 for mgmt of acute symptoms 2/2 sepsis.       Social History:  Social History     Tobacco Use   • Smoking status: Former Smoker   • Smokeless tobacco: Never Used   Substance Use Topics   • Alcohol use: No         Consults:   Consults     Date and Time Order Name Status Description    12/18/2021 10:58 AM Inpatient Palliative Care MD Consult Completed         Exam confirms with auscultation zero audible heart tones and zero audible respirations. Ms.Elizabeth Valdez was pronounced dead at 0850.  MD notified by Patient's RN.     Criselda Ho RN  Clinical House Supervisor  1/8/2022 09:12 EST      Samreen Villeda, [place], MHA, APRN  [facility]  Hospice and Palliative Care Nurse Practitioner  01/10/22  21:57 EST